# Patient Record
Sex: FEMALE | Race: WHITE | Employment: OTHER | URBAN - METROPOLITAN AREA
[De-identification: names, ages, dates, MRNs, and addresses within clinical notes are randomized per-mention and may not be internally consistent; named-entity substitution may affect disease eponyms.]

---

## 2017-07-31 ENCOUNTER — OFFICE VISIT (OUTPATIENT)
Dept: DERMATOLOGY CLINIC | Facility: CLINIC | Age: 82
End: 2017-07-31

## 2017-07-31 DIAGNOSIS — L72.0 MILIA: ICD-10-CM

## 2017-07-31 DIAGNOSIS — D23.60 BENIGN NEOPLASM OF SKIN OF UPPER LIMB, INCLUDING SHOULDER, UNSPECIFIED LATERALITY: ICD-10-CM

## 2017-07-31 DIAGNOSIS — D23.4 BENIGN NEOPLASM OF SCALP AND SKIN OF NECK: ICD-10-CM

## 2017-07-31 DIAGNOSIS — L57.0 LICHENOID ACTINIC KERATOSIS: Primary | ICD-10-CM

## 2017-07-31 DIAGNOSIS — D23.5 BENIGN NEOPLASM OF SKIN OF TRUNK, EXCEPT SCROTUM: ICD-10-CM

## 2017-07-31 DIAGNOSIS — D23.30 BENIGN NEOPLASM OF SKIN OF FACE: ICD-10-CM

## 2017-07-31 DIAGNOSIS — L81.4 LENTIGO: ICD-10-CM

## 2017-07-31 DIAGNOSIS — L30.9 DERMATITIS: ICD-10-CM

## 2017-07-31 PROCEDURE — G0463 HOSPITAL OUTPT CLINIC VISIT: HCPCS | Performed by: DERMATOLOGY

## 2017-07-31 PROCEDURE — 99203 OFFICE O/P NEW LOW 30 MIN: CPT | Performed by: DERMATOLOGY

## 2017-07-31 RX ORDER — TRIAMCINOLONE ACETONIDE 5 MG/G
CREAM TOPICAL
Qty: 15 G | Refills: 1 | Status: SHIPPED | OUTPATIENT
Start: 2017-07-31 | End: 2019-03-18

## 2017-08-13 NOTE — PROGRESS NOTES
Alfredo Gill is a 80year old female. HPI:     CC:  Patient presents with:  Full Skin Exam: new pt. presents for full body exam, no hx of skin CA, presents with multiple itchy spots to arms and abdomen.          Allergies:  Review of patient's allergies sarita Social History Main Topics   Smoking status: Former Smoker     Smokeless tobacco: Never Used    Alcohol use Yes    Drug use: No    Sexual activity: Not on file     Other Topics Concern    Pt has a pacemaker No    Pt has a defibrillator No    Reaction t details of examination  See Assessment /Plan for additional history and physical exam also:    Assessment / plan:    No orders of the defined types were placed in this encounter.       Meds & Refills for this Visit:  Signed Prescriptions Disp Refills    tri checkup 6 mos - one year or p.r.n.

## 2019-03-18 ENCOUNTER — OFFICE VISIT (OUTPATIENT)
Dept: FAMILY MEDICINE CLINIC | Facility: CLINIC | Age: 84
End: 2019-03-18
Payer: MEDICARE

## 2019-03-18 VITALS
SYSTOLIC BLOOD PRESSURE: 130 MMHG | DIASTOLIC BLOOD PRESSURE: 80 MMHG | HEIGHT: 60 IN | WEIGHT: 104 LBS | RESPIRATION RATE: 18 BRPM | HEART RATE: 79 BPM | BODY MASS INDEX: 20.42 KG/M2 | OXYGEN SATURATION: 94 %

## 2019-03-18 DIAGNOSIS — R13.10 DYSPHAGIA, UNSPECIFIED TYPE: ICD-10-CM

## 2019-03-18 DIAGNOSIS — R53.81 PHYSICAL DECONDITIONING: ICD-10-CM

## 2019-03-18 DIAGNOSIS — K43.2 INCISIONAL HERNIA, WITHOUT OBSTRUCTION OR GANGRENE: ICD-10-CM

## 2019-03-18 DIAGNOSIS — R21 RASH: ICD-10-CM

## 2019-03-18 DIAGNOSIS — R26.89 POOR BALANCE: ICD-10-CM

## 2019-03-18 DIAGNOSIS — Z00.00 ROUTINE MEDICAL EXAM: Primary | ICD-10-CM

## 2019-03-18 PROCEDURE — G0439 PPPS, SUBSEQ VISIT: HCPCS | Performed by: FAMILY MEDICINE

## 2019-03-18 NOTE — PROGRESS NOTES
HPI:   Guy Tian is a 80year old female who presents for a Medicare Subsequent Annual Wellness visit (Pt already had Initial Annual Wellness). Has a h/o hernia repair. Will have a hernia that pops out when bending over. Notices in her upper abdomen. screening of functional status. She has difficulties Taking Meds as Rx'd based on screening of functional status. She has difficulties Affording Meds based on screening of functional status.        She has Hearing problems based on screening of labs)   No results found for: WBC, HGB, PLT     ALLERGIES:   She has No Known Allergies.     CURRENT MEDICATIONS:     Outpatient Medications Marked as Taking for the 3/18/19 encounter (Office Visit) with Grupo Brower DO:  prednisoLONE acetate (PRED FORTE) exam    Incisional hernia, without obstruction or gangrene  -     US ABDOMEN COMPLETE (CPT=76700);  Future    Poor balance  -     PHYSICAL THERAPY EXTERNAL    Dysphagia, unspecified type  -     SPEECH THERAPY - INTERNAL    Rash    Physical deconditioning  - Pap: Every 3 yrs age 21-65 or Pap+HPV every 5 yrs age 33-67, age 72 and older at high risk There are no preventive care reminders to display for this patient.  Update Health Maintenance if applicable    Chlamydia  Annually if high risk No results found for:

## 2019-03-31 ENCOUNTER — DIAGNOSTIC TRANS (OUTPATIENT)
Dept: OTHER | Age: 84
End: 2019-03-31

## 2019-04-01 ENCOUNTER — HOSPITAL (OUTPATIENT)
Dept: OTHER | Age: 84
End: 2019-04-01

## 2019-04-01 ENCOUNTER — HOSPITAL (OUTPATIENT)
Dept: OTHER | Age: 84
End: 2019-04-01
Attending: INTERNAL MEDICINE

## 2019-04-01 LAB
ANALYZER ANC (IANC): NORMAL
ANION GAP SERPL CALC-SCNC: 14 MMOL/L (ref 10–20)
APTT PPP: 25 SECONDS (ref 22–32)
APTT PPP: NORMAL S
BASOPHILS # BLD: 0 THOUSAND/MCL (ref 0–0.3)
BASOPHILS NFR BLD: 0 %
BUN SERPL-MCNC: 20 MG/DL (ref 6–20)
BUN/CREAT SERPL: 28 (ref 7–25)
CALCIUM SERPL-MCNC: 8.8 MG/DL (ref 8.4–10.2)
CHLORIDE: 101 MMOL/L (ref 98–107)
CHOLEST SERPL-MCNC: 152 MG/DL
CHOLEST/HDLC SERPL: 2 {RATIO}
CO2 SERPL-SCNC: 26 MMOL/L (ref 21–32)
CREAT SERPL-MCNC: 0.72 MG/DL (ref 0.51–0.95)
DIFFERENTIAL METHOD BLD: NORMAL
EOSINOPHIL # BLD: 0.1 THOUSAND/MCL (ref 0.1–0.5)
EOSINOPHIL NFR BLD: 3 %
ERYTHROCYTE [DISTWIDTH] IN BLOOD: 12.9 % (ref 11–15)
GLUCOSE BLDC GLUCOMTR-MCNC: 95 MG/DL (ref 65–99)
GLUCOSE SERPL-MCNC: 110 MG/DL (ref 65–99)
HDLC SERPL-MCNC: 75 MG/DL
HEMATOCRIT: 36.8 % (ref 36–46.5)
HGB BLD-MCNC: 12.3 GM/DL (ref 12–15.5)
IMM GRANULOCYTES # BLD AUTO: 0 THOUSAND/MCL (ref 0–0.2)
IMM GRANULOCYTES NFR BLD: 0 %
INR PPP: 1.1
LDLC SERPL CALC-MCNC: 69 MG/DL
LYMPHOCYTES # BLD: 1.4 THOUSAND/MCL (ref 1–4)
LYMPHOCYTES NFR BLD: 29 %
MAGNESIUM SERPL-MCNC: 1.9 MG/DL (ref 1.7–2.4)
MCH RBC QN AUTO: 29.2 PG (ref 26–34)
MCHC RBC AUTO-ENTMCNC: 33.4 GM/DL (ref 32–36.5)
MCV RBC AUTO: 87.4 FL (ref 78–100)
MONOCYTES # BLD: 0.5 THOUSAND/MCL (ref 0.3–0.9)
MONOCYTES NFR BLD: 10 %
NEUTROPHILS # BLD: 2.9 THOUSAND/MCL (ref 1.8–7.7)
NEUTROPHILS NFR BLD: 58 %
NEUTS SEG NFR BLD: NORMAL %
NONHDLC SERPL-MCNC: 77 MG/DL
NRBC (NRBCRE): 0 /100 WBC
PLATELET # BLD: 189 THOUSAND/MCL (ref 140–450)
POTASSIUM SERPL-SCNC: 4.3 MMOL/L (ref 3.4–5.1)
PROTHROMBIN TIME: 11.4 SECONDS (ref 9.7–11.8)
PROTHROMBIN TIME: NORMAL
RBC # BLD: 4.21 MILLION/MCL (ref 4–5.2)
SODIUM SERPL-SCNC: 137 MMOL/L (ref 135–145)
TRIGLYCERIDE (TRIGP): 38 MG/DL
TROPONIN I SERPL HS-MCNC: <0.02 NG/ML
WBC # BLD: 5 THOUSAND/MCL (ref 4.2–11)

## 2019-04-01 PROCEDURE — 93306 TTE W/DOPPLER COMPLETE: CPT | Performed by: INTERNAL MEDICINE

## 2019-04-12 ENCOUNTER — OFFICE VISIT (OUTPATIENT)
Dept: DERMATOLOGY CLINIC | Facility: CLINIC | Age: 84
End: 2019-04-12
Payer: MEDICARE

## 2019-04-12 DIAGNOSIS — L30.9 DERMATITIS: ICD-10-CM

## 2019-04-12 DIAGNOSIS — D23.60 BENIGN NEOPLASM OF SKIN OF UPPER LIMB, INCLUDING SHOULDER, UNSPECIFIED LATERALITY: ICD-10-CM

## 2019-04-12 DIAGNOSIS — D23.5 BENIGN NEOPLASM OF SKIN OF TRUNK, EXCEPT SCROTUM: ICD-10-CM

## 2019-04-12 DIAGNOSIS — D23.4 BENIGN NEOPLASM OF SCALP AND SKIN OF NECK: ICD-10-CM

## 2019-04-12 DIAGNOSIS — D23.30 BENIGN NEOPLASM OF SKIN OF FACE: ICD-10-CM

## 2019-04-12 DIAGNOSIS — L72.0 MILIA: Primary | ICD-10-CM

## 2019-04-12 DIAGNOSIS — L81.4 LENTIGO: ICD-10-CM

## 2019-04-12 PROCEDURE — 99213 OFFICE O/P EST LOW 20 MIN: CPT | Performed by: DERMATOLOGY

## 2019-04-12 PROCEDURE — G0463 HOSPITAL OUTPT CLINIC VISIT: HCPCS | Performed by: DERMATOLOGY

## 2019-04-12 RX ORDER — ATORVASTATIN CALCIUM 10 MG/1
10 TABLET, FILM COATED ORAL
COMMUNITY
Start: 2019-04-01 | End: 2021-05-19

## 2019-04-12 RX ORDER — MULTIVIT-MIN/IRON/FOLIC ACID/K 18-600-40
CAPSULE ORAL
COMMUNITY
Start: 2019-04-01 | End: 2021-09-10

## 2019-04-22 NOTE — PROGRESS NOTES
Alfredo Gill is a 80year old female. HPI:     CC:  Patient presents with:  Full Skin Exam: LOV 7/31/2017. Pt presenting for full body skin exam. Pt denies personal and family hx of skin cancer.          Allergies:  Ciprofloxacin; Penicillins; Sulfamethoxazo Worry: Not on file        Inability: Not on file      Transportation needs:        Medical: Not on file        Non-medical: Not on file    Tobacco Use      Smoking status: Former Smoker      Smokeless tobacco: Never Used    Substance and Sexual Activit noted.       Physical Examination:     Well-developed well-nourished patient alert oriented in no acute distress.   Exam total-body performed, including scalp, head, neck, face,nails, hair, external eyes, including conjunctival mucosa, eyelids, lips externa needed. Lichenoid keratosis right forearm. Resolved presently     cyst anterior hairline recommend observation. Unchanging    Multiple lentigines no other suspicious lesions    On examination multiple whitish dome-shaped smooth papules are noted.   Mil

## 2019-04-24 ENCOUNTER — HOSPITAL ENCOUNTER (OUTPATIENT)
Dept: ULTRASOUND IMAGING | Age: 84
Discharge: HOME | End: 2019-04-24
Attending: FAMILY MEDICINE
Payer: MEDICARE

## 2019-04-24 DIAGNOSIS — K43.2 INCISIONAL HERNIA, WITHOUT OBSTRUCTION OR GANGRENE: ICD-10-CM

## 2019-04-24 PROCEDURE — 76700 US EXAM ABDOM COMPLETE: CPT | Performed by: FAMILY MEDICINE

## 2019-04-30 ENCOUNTER — TELEPHONE (OUTPATIENT)
Dept: FAMILY MEDICINE CLINIC | Facility: CLINIC | Age: 84
End: 2019-04-30

## 2019-05-01 NOTE — TELEPHONE ENCOUNTER
Pt notified of Kris Perry findings of her US:    Notes recorded by Foy Jeans, DO on 4/24/2019 at 12:25 PM CDT  Please call patient with results. The ultrasound of the abdomen is normal, there was nothing else of concern seen on the ultrasound.     Pt verbaliz

## 2019-05-15 ENCOUNTER — OFFICE VISIT (OUTPATIENT)
Dept: FAMILY MEDICINE CLINIC | Facility: CLINIC | Age: 84
End: 2019-05-15
Payer: MEDICARE

## 2019-05-15 VITALS
DIASTOLIC BLOOD PRESSURE: 60 MMHG | SYSTOLIC BLOOD PRESSURE: 120 MMHG | WEIGHT: 111 LBS | HEIGHT: 60 IN | HEART RATE: 83 BPM | BODY MASS INDEX: 21.79 KG/M2 | OXYGEN SATURATION: 90 %

## 2019-05-15 DIAGNOSIS — S80.811A ABRASION OF ANTERIOR RIGHT LOWER LEG, INITIAL ENCOUNTER: Primary | ICD-10-CM

## 2019-05-15 PROCEDURE — 99213 OFFICE O/P EST LOW 20 MIN: CPT | Performed by: FAMILY MEDICINE

## 2019-05-15 NOTE — PROGRESS NOTES
Delmi Higgins is a 80year old female. Patient presents with:  Wound Recheck: c/o cut on left leg. HPI:   4 days ago scraped her shin on the car door. Recently had a TIA then started taking a baby ASA.    When she scraped her leg there was a lot of ble Substance and Sexual Activity      Alcohol use: Yes      Drug use: No      Sexual activity: Not on file    Lifestyle      Physical activity:        Days per week: Not on file        Minutes per session: Not on file      Stress: Not on file    Relationships Abrasion of anterior right lower leg, initial encounter  - [facility] WOUND EVAL    Continue to keep wound clean and dry and covered. Referred to wound care for further management.      Given further recommendations as below    Orders Placed This Visit:  No orde

## 2019-05-17 ENCOUNTER — TELEPHONE (OUTPATIENT)
Dept: INTEGRATIVE MEDICINE | Facility: CLINIC | Age: 84
End: 2019-05-17

## 2019-05-17 DIAGNOSIS — S80.812A ABRASION OF ANTERIOR LEFT LOWER LEG, INITIAL ENCOUNTER: Primary | ICD-10-CM

## 2019-05-17 NOTE — TELEPHONE ENCOUNTER
RE:  Dx: Abrasion of anterior right lower leg, initial encounter (X53.707O)    PT needs orders changed to correct LEFT leg    Please call PT when this is corrected so she can schedule with wound clinic

## 2019-05-19 NOTE — TELEPHONE ENCOUNTER
----- Message from Sommer Webber sent at 5/17/2019 10:23 AM CDT -----  Regarding: Order Correction  Good morning,  An order was placed for the right leg, the patient is stating its her left leg. Can you please correct the order.     Thank you  Milad Fuentes

## 2019-05-23 ENCOUNTER — OFFICE VISIT (OUTPATIENT)
Dept: FAMILY MEDICINE CLINIC | Facility: CLINIC | Age: 84
End: 2019-05-23
Payer: MEDICARE

## 2019-05-23 ENCOUNTER — TELEPHONE (OUTPATIENT)
Dept: FAMILY MEDICINE CLINIC | Facility: CLINIC | Age: 84
End: 2019-05-23

## 2019-05-23 VITALS
DIASTOLIC BLOOD PRESSURE: 82 MMHG | BODY MASS INDEX: 21.67 KG/M2 | OXYGEN SATURATION: 96 % | TEMPERATURE: 98 F | HEIGHT: 60 IN | WEIGHT: 110.38 LBS | SYSTOLIC BLOOD PRESSURE: 130 MMHG | HEART RATE: 88 BPM

## 2019-05-23 DIAGNOSIS — S80.812D ABRASION OF ANTERIOR LEFT LOWER LEG, SUBSEQUENT ENCOUNTER: Primary | ICD-10-CM

## 2019-05-23 PROCEDURE — 99213 OFFICE O/P EST LOW 20 MIN: CPT | Performed by: PHYSICIAN ASSISTANT

## 2019-05-23 NOTE — PROGRESS NOTES
Mallory Burnett is a 80year old female. Patient presents with: Follow - Up      HPI:   Patient presents to follow up on anterior leg wound. It has been 3 weeks and it is not looking any better. Patient denies fever or pain. No pus.  She is changing bandages (VITAMIN D OR) Take  by mouth. Disp:  Rfl:    Cranberry (CRAN-MAX OR) Take  by mouth. Disp:  Rfl:        SOCIAL HISTORY:   Social History    Socioeconomic History      Marital status:        Spouse name: Not on file      Number of children: Not on fi BP: 130/82   Pulse: 88   Temp: 98.2 °F (36.8 °C)   SpO2: 96%   Weight: 110 lb 6.4 oz   Height: 60\"       Physical Exam   Constitutional: She is oriented to person, place, and time and well-developed, well-nourished, and in no distress.    Neck: Normal ra Reece Lombardo, PA

## 2019-05-23 NOTE — TELEPHONE ENCOUNTER
RN spoke with patients daughter after consulting with Baljit Bethea, and the patient should keep the appt for next week at clinic, if there any issue of grave concern, patient can be seen in the ER

## 2019-05-23 NOTE — TELEPHONE ENCOUNTER
Josie Gomes (daughter) Cell [phone] calling regarding wound care clinic appointment, daughter states first available 05/31 and wants to know if we can get her in sooner.

## 2019-05-30 ENCOUNTER — OFFICE VISIT (OUTPATIENT)
Dept: WOUND CARE | Facility: HOSPITAL | Age: 84
End: 2019-05-30
Attending: CLINICAL NURSE SPECIALIST
Payer: MEDICARE

## 2019-05-30 DIAGNOSIS — S80.812A ABRASION OF LEFT LEG: Primary | ICD-10-CM

## 2019-05-30 DIAGNOSIS — I87.2 VENOUS INSUFFICIENCY: ICD-10-CM

## 2019-05-30 PROCEDURE — 29581 APPL MULTLAYER CMPRN SYS LEG: CPT

## 2019-05-30 PROCEDURE — 99214 OFFICE O/P EST MOD 30 MIN: CPT

## 2019-05-30 NOTE — PROGRESS NOTES
Subjective    Chief Complaint  This information was obtained from the patient  The patient is new to the [address] [facility],[address] here for an initial visit for the evaluation and management of non-healing wound(s).     Allergies  ciprofloxacin, Penicillins, sulfamethox Genitourinary (): Urinary Incontinence (wears pads)  Musculoskeletal: Other (leg cramps)  Integumentary (Hair/Skin/Nails): Calluses/Corns (Right plantar-recommended to follow up with a Podiatrist for trimming of toenails and callus paring. ), Ulcers (LLE Depression Severity: 0-4: none, 5-9 mild, 10-14 moderate, 15-19 moderately severe, 20-27 severe: 0    Medications  atorvastatin - oral 10 mg tablet once daily  prednisolone acetate - ophthalmic (eye) 1 % drops,suspension once daily  aspirin - oral 81 mg ta Wound #2 Left, Distal, Anterior Lower Leg is an Eschar covered Trauma Wound and has received a status of Not Healed. Initial wound encounter measurements are 2.5cm length x 2.7cm width, with an area of 6.75 sq cm . No tunneling has been noted.  No sinus tra Extremity Color: WNL  Hair Growth on Extremity: No  Temperature of Extremity: Warm  Capillary Refill: < 3 seconds          Assessment    Active Problems    ICD-10  (Encounter Diagnosis) I87.2 - Venous insufficiency (chronic) (peripheral)  (Encounter Diagno Wound #1 (Trauma Wound) is located on the left, proximal, anterior lower leg. A Multi Layer Compression Wrap procedure was performed by Susanne Ontiveros RN. Kyrie Woodard was applied with . The procedure was tolerated well.         Plan    Wound O Multi Layer Compression Wrap Details  Patient Name: OBINNA TRAN   Patient Number: [id number]  Patient YOB: 1931  Date: 5/30/2019  RN: Susanne Ontiveros CNA/ZACHARY/MEHDI: Katt Omalley  Physician / Rashmi Parra: Sonny Prince  Procedure Performed for: Jeffery Louis

## 2019-06-04 ENCOUNTER — OFFICE VISIT (OUTPATIENT)
Dept: FAMILY MEDICINE CLINIC | Facility: CLINIC | Age: 84
End: 2019-06-04
Payer: MEDICARE

## 2019-06-04 VITALS
RESPIRATION RATE: 16 BRPM | HEIGHT: 60 IN | OXYGEN SATURATION: 93 % | DIASTOLIC BLOOD PRESSURE: 60 MMHG | HEART RATE: 85 BPM | SYSTOLIC BLOOD PRESSURE: 110 MMHG | BODY MASS INDEX: 21.4 KG/M2 | WEIGHT: 109 LBS

## 2019-06-04 DIAGNOSIS — R13.10 DYSPHAGIA, UNSPECIFIED TYPE: ICD-10-CM

## 2019-06-04 DIAGNOSIS — R26.89 POOR BALANCE: ICD-10-CM

## 2019-06-04 DIAGNOSIS — S80.812D ABRASION OF LEFT LOWER EXTREMITY, SUBSEQUENT ENCOUNTER: Primary | ICD-10-CM

## 2019-06-04 PROCEDURE — 99214 OFFICE O/P EST MOD 30 MIN: CPT | Performed by: FAMILY MEDICINE

## 2019-06-04 NOTE — PROGRESS NOTES
Brien Santiago is a 80year old female. Patient presents with: Follow - Up: 2 months      HPI:     Going for PT which is helping her neck and her posture, also improving her pain and her ROM. Going to [facility] in [place]. Seeing wound care for LLE wound.    Wound i Occupational History      Not on file    Social Needs      Financial resource strain: Not on file      Food insecurity:        Worry: Not on file        Inability: Not on file      Transportation needs:        Medical: Not on file        Non-medical: Not o normal.   Neck: Neck supple. Cardiovascular: Normal rate. Pulmonary/Chest: Effort normal.   Musculoskeletal: She exhibits no edema. Neurological: She is alert and oriented to person, place, and time. No cranial nerve deficit.  Gait normal.   Skin: Ski

## 2019-06-05 ENCOUNTER — OFFICE VISIT (OUTPATIENT)
Dept: WOUND CARE | Facility: HOSPITAL | Age: 84
End: 2019-06-05
Attending: CLINICAL NURSE SPECIALIST
Payer: MEDICARE

## 2019-06-05 DIAGNOSIS — I87.2 VENOUS INSUFFICIENCY: ICD-10-CM

## 2019-06-05 DIAGNOSIS — S81.802D LEG WOUND, LEFT, SUBSEQUENT ENCOUNTER: Primary | ICD-10-CM

## 2019-06-05 DIAGNOSIS — S80.812D ABRASION OF LEFT LOWER EXTREMITY, SUBSEQUENT ENCOUNTER: ICD-10-CM

## 2019-06-05 PROCEDURE — 29581 APPL MULTLAYER CMPRN SYS LEG: CPT

## 2019-06-05 NOTE — PROGRESS NOTES
Subjective    Chief Complaint  This information was obtained from the patient  The patient is new to the [address] [facility],[address] here for an initial visit for the evaluation and management of non-healing wound(s).  6/5/19 no additional concerns    Allergies  ciproflox Musculoskeletal: Assistive Devices  Neurological: Memory Loss (patient denies), Dizziness, Headaches  Psychiatric: Anxiety, Depression        Objective    Constitutional  Height/Length: 64 in (162.56 cm), Weight: 105 lbs (47.73 kgs), BMI: 18, Temperature: The periwound skin exhibited: Edema, Dry/Scaly, Erythema, Hemosiderosis.  The periwound skin did not exhibit: Brawny Induration, Excoriation, Induration, Callus, Crepitus, Fluctuance, Friable, Rash, Moist, Maceration, Atrophie Tammy, Cyanosis, Ecchymosis, Wound #2 (Trauma Wound) is located on the left, distal, anterior lower leg. A Multi Layer Compression Wrap procedure was performed for the lower left extremity by Sathya Saravia RN. [place] Feathers was applied with .  The procedure was tolerated w Plan: To schedule appointment with [facility] medical to discuss options of Velcro compression garments for long-term management of venous insufficiency and prevention of recurrence of venous stasis ulcers.       Entered By: Elis Ho on 06/05/2019 4:21:0

## 2019-06-07 ENCOUNTER — APPOINTMENT (OUTPATIENT)
Dept: WOUND CARE | Facility: HOSPITAL | Age: 84
End: 2019-06-07
Payer: MEDICARE

## 2019-06-12 ENCOUNTER — OFFICE VISIT (OUTPATIENT)
Dept: WOUND CARE | Facility: HOSPITAL | Age: 84
End: 2019-06-12
Attending: CLINICAL NURSE SPECIALIST
Payer: MEDICARE

## 2019-06-12 DIAGNOSIS — S80.812D ABRASION OF LEFT LOWER EXTREMITY, SUBSEQUENT ENCOUNTER: ICD-10-CM

## 2019-06-12 PROCEDURE — 99213 OFFICE O/P EST LOW 20 MIN: CPT

## 2019-06-12 NOTE — PROGRESS NOTES
Subjective    Chief Complaint  This information was obtained from the patient  The patient is new to the [address] [facility],[address] here for an initial visit for the evaluation and management of non-healing wound(s).  6/12/19 no additional concerns    Allergies  ciproflo Gastrointestinal (GI): Change in Bowel Habits  Musculoskeletal: Assistive Devices  Neurological: Memory Loss (patient denies), Dizziness, Headaches  Psychiatric: Anxiety, Depression        Objective    Constitutional  Height/Length: 64 in (162.56 cm), Frances Mccarty Patient 's LLE wound healed with epithelialization noted. Vaseline to moisturize dry skin to LLE . Applied tubigrip stocking to LLE. Instructed to apply tubigrip on in morning and off at bedtime. To apply lotion BLE at bedtime.  Tubigrip stocking last 1 mon

## 2019-06-19 ENCOUNTER — APPOINTMENT (OUTPATIENT)
Dept: WOUND CARE | Facility: HOSPITAL | Age: 84
End: 2019-06-19
Attending: CLINICAL NURSE SPECIALIST
Payer: MEDICARE

## 2019-06-26 ENCOUNTER — APPOINTMENT (OUTPATIENT)
Dept: WOUND CARE | Facility: HOSPITAL | Age: 84
End: 2019-06-26
Attending: CLINICAL NURSE SPECIALIST
Payer: MEDICARE

## 2019-09-11 ENCOUNTER — TELEPHONE (OUTPATIENT)
Dept: PHYSICAL THERAPY | Facility: HOSPITAL | Age: 84
End: 2019-09-11

## 2019-09-12 ENCOUNTER — OFFICE VISIT (OUTPATIENT)
Dept: SPEECH THERAPY | Facility: HOSPITAL | Age: 84
End: 2019-09-12
Attending: FAMILY MEDICINE
Payer: MEDICARE

## 2019-09-12 PROCEDURE — 92610 EVALUATE SWALLOWING FUNCTION: CPT

## 2019-09-12 NOTE — PATIENT INSTRUCTIONS
SWALLOWING INSTRUCTIONS    DIET: Soft/Ground Solids with extra sauces/gravies and thin liquids     ____ SIT UPRIGHT    ____ SMALL BITES AND SIPS    ____ EAT SLOWLY    ____ ALTERNATED LIQUIDS AND SOLIDS    ____ [place] [employer] TWICE WITH EACH BITE    ____ NO STRA sounds:           Jolie Burns 171         Count        Goat Gold      Garden    Garlic      Correct    Caution      Cage                  Guilt   Gas       Gather     Karen Gatica

## 2019-09-12 NOTE — PROGRESS NOTES
ADULT SWALLOWING EVALUATION:   Referring Physician: Dr. Gordo Cornell  Oropharyngeal Dysphagia  Date of Service: 9/12/2019     PATIENT SUMMARY:   Britney Paredes is a 80year old y/o female who presents to therapy today with complaints of difficulty swallowing that s solids. Hard solids d/c d/t safety concerns.   No overt clinical signs of aspiration (e.g., immediate/delayed throat clear, immediate/delayed cough, wet vocal quality, increased O2 effort) observed across pureed solids, soft solids, nectar thickened liquids Laryngeal elevation and excursion reduced  and Signs/symptoms of aspiration none   (Please note: Silent aspiration cannot be evaluated clinically.  Videofluoroscopic Swallow Study is required to rule-out silent aspiration.)    Compensatory Swallow Strategie [phone]    Sincerely,  Electronically signed by therapist: MAAME Mayers    [de-identified] certification required: Yes  I certify the need for these services furnished under this plan of treatment and while under my care.         X_________________

## 2019-09-13 ENCOUNTER — TELEPHONE (OUTPATIENT)
Dept: INTEGRATIVE MEDICINE | Facility: CLINIC | Age: 84
End: 2019-09-13

## 2019-09-13 DIAGNOSIS — R13.10 DYSPHAGIA, UNSPECIFIED TYPE: Primary | ICD-10-CM

## 2019-09-13 DIAGNOSIS — R47.1 DYSARTHRIA: ICD-10-CM

## 2019-09-13 NOTE — TELEPHONE ENCOUNTER
----- Message from MAAME Hoffman sent at 9/12/2019  5:44 PM CDT -----  Regarding: Orders   Good afternoon,  Thank you for the referral for speech therapy. Could we have orders for   1. Video Fluoroscopic Swallow Study   2.  Speech-Therapy for dysar

## 2019-09-16 ENCOUNTER — APPOINTMENT (OUTPATIENT)
Dept: SPEECH THERAPY | Facility: HOSPITAL | Age: 84
End: 2019-09-16
Attending: FAMILY MEDICINE
Payer: MEDICARE

## 2019-09-18 ENCOUNTER — HOSPITAL ENCOUNTER (OUTPATIENT)
Dept: GENERAL RADIOLOGY | Facility: HOSPITAL | Age: 84
Discharge: HOME | End: 2019-09-18
Attending: FAMILY MEDICINE
Payer: MEDICARE

## 2019-09-18 DIAGNOSIS — R13.10 DYSPHAGIA, UNSPECIFIED TYPE: ICD-10-CM

## 2019-09-18 PROCEDURE — 74246 X-RAY XM UPR GI TRC 2CNTRST: CPT | Performed by: FAMILY MEDICINE

## 2019-09-26 ENCOUNTER — OFFICE VISIT (OUTPATIENT)
Dept: SPEECH THERAPY | Facility: HOSPITAL | Age: 84
End: 2019-09-26
Attending: FAMILY MEDICINE
Payer: MEDICARE

## 2019-09-26 PROCEDURE — 92526 ORAL FUNCTION THERAPY: CPT

## 2019-09-26 NOTE — PATIENT INSTRUCTIONS
1. CONTINUE EATING SOFT FOODS AND THIN LIQUIDS VIA CUP SIPS   2. Continue your swallowing exercises   3.  Please schedule your video swallow study (the x-ray of your swallow)           HARD/ EFFORTFUL SWALLOW      PURPOSE  To increase tongue base retraction

## 2019-10-03 ENCOUNTER — APPOINTMENT (OUTPATIENT)
Dept: SPEECH THERAPY | Facility: HOSPITAL | Age: 84
End: 2019-10-03
Attending: FAMILY MEDICINE
Payer: MEDICARE

## 2019-10-07 ENCOUNTER — APPOINTMENT (OUTPATIENT)
Dept: SPEECH THERAPY | Facility: HOSPITAL | Age: 84
End: 2019-10-07
Attending: FAMILY MEDICINE
Payer: MEDICARE

## 2019-10-09 ENCOUNTER — TELEPHONE (OUTPATIENT)
Dept: PHYSICAL THERAPY | Age: 84
End: 2019-10-09

## 2019-10-10 ENCOUNTER — APPOINTMENT (OUTPATIENT)
Dept: SPEECH THERAPY | Facility: HOSPITAL | Age: 84
End: 2019-10-10
Attending: FAMILY MEDICINE
Payer: MEDICARE

## 2019-10-14 ENCOUNTER — TELEPHONE (OUTPATIENT)
Dept: SPEECH THERAPY | Facility: HOSPITAL | Age: 84
End: 2019-10-14

## 2019-10-14 ENCOUNTER — OFFICE VISIT (OUTPATIENT)
Dept: SPEECH THERAPY | Facility: HOSPITAL | Age: 84
End: 2019-10-14
Attending: FAMILY MEDICINE
Payer: MEDICARE

## 2019-10-14 PROCEDURE — 92523 SPEECH SOUND LANG COMPREHEN: CPT

## 2019-10-14 PROCEDURE — 92526 ORAL FUNCTION THERAPY: CPT

## 2019-10-14 NOTE — PROGRESS NOTES
ADULT SPEECH/LANGUAGE EVALUATION:   Referring Physician: Dr. Colonel Roy  Diagnosis: CVA    Date of Service: 10/14/2019     PATIENT SUMMARY   Ilene Grimes is a 80year old female who presents to therapy today with complaints of difficulty with speech production assessed    Memory:n/a to be further assessed    Executive Functions: n/a to be further assessed    Language: n/a to be further assessed    Visuospatial Skills: n/a to be further assessed      BOUNDARY [facility] Diagnostic Aphasia Examination (AE)-Informal Speech As and Communication  Rehab Potential:good      Patient/Family/Caregiver was advised of these findings, precautions, and treatment options and has agreed to actively participate in planning and for this course of care.     Thank you for your referral. Please c

## 2019-10-14 NOTE — PROGRESS NOTES
Dx: Dysphagia         Authorized # of Visits:  4         Next MD visit: none scheduled  Fall Risk: standard         Precautions: n/a           Medication Changes since last visit?: No  Subjective:   Pt called and cancelled her VFSS this AM. SLP reviewed th compensatory strategies and aspiration precautions with the patient. Pt continues to report swallowing difficulties. VFSS procedure reviewed. Pt completes dysphagia exercises x10 each with mild-mod support.  Pt tolerates thin liquids via cup with no overt c

## 2019-10-16 ENCOUNTER — TELEPHONE (OUTPATIENT)
Dept: SPEECH THERAPY | Facility: HOSPITAL | Age: 84
End: 2019-10-16

## 2019-10-16 NOTE — PROGRESS NOTES
Pt called SLP to state, \"Tomorrow is a definite no. I do not want to take the swallow test.\" SLP to continue POC and provide education x1. Pt's daughter did not return SLP's phone call.        Melvin Guerrero M.S. [id number] [facility]  Speech Language Pathologist

## 2019-10-21 ENCOUNTER — APPOINTMENT (OUTPATIENT)
Dept: SPEECH THERAPY | Facility: HOSPITAL | Age: 84
End: 2019-10-21
Attending: FAMILY MEDICINE
Payer: MEDICARE

## 2019-10-24 ENCOUNTER — OFFICE VISIT (OUTPATIENT)
Dept: SPEECH THERAPY | Facility: HOSPITAL | Age: 84
End: 2019-10-24
Attending: FAMILY MEDICINE
Payer: MEDICARE

## 2019-10-24 PROCEDURE — 92526 ORAL FUNCTION THERAPY: CPT

## 2019-10-24 PROCEDURE — 92507 TX SP LANG VOICE COMM INDIV: CPT

## 2019-10-24 NOTE — PROGRESS NOTES
Dx: Dysphagia         Authorized # of Visits:  4         Next MD visit: none scheduled  Fall Risk: standard         Precautions: n/a           Medication Changes since last visit?: No  Subjective:   Pt did not comply with VFSS recommendations; however, day words     x10 BOT retraction        Labial exercises  x10 open/smile     x10 pucker/retract x10 smile + pucker     x10 open+close mouth  x10 pucker + simple     x10 open and close mouth        Lingual Exercises  x10 cheek to cheek     x10 corner to corner reinforcement of SLOB strategies and train oral/verbal agility tasks x1 session and d/c.  See data above         Goals:      Goal #1 The patient will tolerate ground solid consistency and thin liquids without overt signs or symptoms of aspiration with 100 %

## 2019-10-28 ENCOUNTER — OFFICE VISIT (OUTPATIENT)
Dept: SPEECH THERAPY | Facility: HOSPITAL | Age: 84
End: 2019-10-28
Attending: FAMILY MEDICINE
Payer: MEDICARE

## 2019-10-28 PROCEDURE — 92507 TX SP LANG VOICE COMM INDIV: CPT

## 2019-10-28 PROCEDURE — 92526 ORAL FUNCTION THERAPY: CPT

## 2019-10-28 NOTE — PROGRESS NOTES
SPEECH/SWALLOWING DAILY NOTE/DISCHARGE SUMMARY     Dx: Dysphagia         Authorized # of Visits:  4         Next MD visit: none scheduled  Fall Risk: standard         Precautions: n/a           Medication Changes since last visit?: No  Subjective:   Pt rep x20 AH-EE    x10 increasing pitch glides     x10 decreasing pitch glides     x10 falsetto /i/ 5 second holds     x10 effortful swallow    BOT  xBOT retraction x10     x10 G words     x3 Erlinda  x10 k words     x10 g words     x2 erlinda     x5 BOT retractio clinical signs of aspiration (e.g., immediate/delayed throat clear, immediate/delayed cough, wet vocal quality, increased O2 effort). Pt denies any globus sensation across all trials. D/C skilled 1:1 services d/t functional skills.  Pt did not participate i SLP f/u with her daughter. Pt's daughter did not return SLP's phone call. Safe swallow guideline, aspiration precautions, ground consistency, and GERD precautions provided. Pt v/u.     Goal #3 Patient will reduce risk of aspiration by completing the followi

## 2019-11-21 ENCOUNTER — TELEPHONE (OUTPATIENT)
Dept: INTEGRATIVE MEDICINE | Facility: CLINIC | Age: 84
End: 2019-11-21

## 2020-02-15 NOTE — PATIENT INSTRUCTIONS
Continue changing once a day. If fever or pus coming out of the wound go to the ER. Put in referral for the wound clinic in [place] to see if she can get in faster.  Otherwise keep appointment in [facility].
Yes...

## 2020-03-06 ENCOUNTER — OFFICE VISIT (OUTPATIENT)
Dept: DERMATOLOGY CLINIC | Facility: CLINIC | Age: 85
End: 2020-03-06
Payer: MEDICARE

## 2020-03-06 DIAGNOSIS — D23.5 BENIGN NEOPLASM OF SKIN OF TRUNK, EXCEPT SCROTUM: ICD-10-CM

## 2020-03-06 DIAGNOSIS — D23.70 BENIGN NEOPLASM OF SKIN OF LOWER LIMB, INCLUDING HIP, UNSPECIFIED LATERALITY: ICD-10-CM

## 2020-03-06 DIAGNOSIS — D23.60 BENIGN NEOPLASM OF SKIN OF UPPER LIMB, INCLUDING SHOULDER, UNSPECIFIED LATERALITY: ICD-10-CM

## 2020-03-06 DIAGNOSIS — L82.1 SEBORRHEIC KERATOSES: ICD-10-CM

## 2020-03-06 DIAGNOSIS — L81.4 LENTIGO: Primary | ICD-10-CM

## 2020-03-06 DIAGNOSIS — D23.4 BENIGN NEOPLASM OF SCALP AND SKIN OF NECK: ICD-10-CM

## 2020-03-06 DIAGNOSIS — D23.30 BENIGN NEOPLASM OF SKIN OF FACE: ICD-10-CM

## 2020-03-06 PROCEDURE — G0463 HOSPITAL OUTPT CLINIC VISIT: HCPCS | Performed by: DERMATOLOGY

## 2020-03-06 PROCEDURE — 99213 OFFICE O/P EST LOW 20 MIN: CPT | Performed by: DERMATOLOGY

## 2020-03-16 NOTE — PROGRESS NOTES
Mark Santoyo is a 80year old female. HPI:     CC:  Patient presents with: Follow - Up: per patient here for full body check.  patient states she has \"discoloration\"of arms, looks like bruising        Allergies:  Ciprofloxacin; Penicillins; Sulfamethoxazo Not on file      Transportation needs:        Medical: Not on file        Non-medical: Not on file    Tobacco Use      Smoking status: Former Smoker      Smokeless tobacco: Never Used    Substance and Sexual Activity      Alcohol use: Yes      Drug use: No or changeing lesions other than noted above. No fevers, chills, night sweats, unusual sun sensitivity. No other skin complaints. History, medications, allergies reviewed as noted.        Physical Examination:     Well-developed well-nourished patien emollients. Pathophysiology reviewed. Consider Contac allergy in differential.  Consider patch testing. Patient will let us know how they are doing over the next several weeks. Await clinical response to above therapies. Face abdomen.   Topical stero

## 2020-09-02 ENCOUNTER — TELEPHONE (OUTPATIENT)
Dept: INTEGRATIVE MEDICINE | Facility: CLINIC | Age: 85
End: 2020-09-02

## 2020-10-01 ENCOUNTER — TELEPHONE (OUTPATIENT)
Dept: INTEGRATIVE MEDICINE | Facility: CLINIC | Age: 85
End: 2020-10-01

## 2021-01-15 ENCOUNTER — TELEPHONE (OUTPATIENT)
Dept: INTEGRATIVE MEDICINE | Facility: CLINIC | Age: 86
End: 2021-01-15

## 2021-01-15 NOTE — TELEPHONE ENCOUNTER
Spoke to pt in attempts to schedule AWV. Pt does not drive. Her daughter is going to call office to set up a visit for her when she can bring her.

## 2021-05-19 ENCOUNTER — OFFICE VISIT (OUTPATIENT)
Dept: INTEGRATIVE MEDICINE | Facility: CLINIC | Age: 86
End: 2021-05-19
Payer: MEDICARE

## 2021-05-19 VITALS
OXYGEN SATURATION: 94 % | SYSTOLIC BLOOD PRESSURE: 134 MMHG | HEART RATE: 82 BPM | BODY MASS INDEX: 20.06 KG/M2 | HEIGHT: 60 IN | WEIGHT: 102.19 LBS | DIASTOLIC BLOOD PRESSURE: 76 MMHG

## 2021-05-19 DIAGNOSIS — H91.90 HEARING LOSS, UNSPECIFIED HEARING LOSS TYPE, UNSPECIFIED LATERALITY: ICD-10-CM

## 2021-05-19 DIAGNOSIS — R13.10 DYSPHAGIA, UNSPECIFIED TYPE: ICD-10-CM

## 2021-05-19 DIAGNOSIS — Z00.00 ROUTINE MEDICAL EXAM: Primary | ICD-10-CM

## 2021-05-19 PROBLEM — S80.812A ABRASION OF LEFT LEG: Status: RESOLVED | Noted: 2019-05-30 | Resolved: 2021-05-19

## 2021-05-19 PROCEDURE — G0439 PPPS, SUBSEQ VISIT: HCPCS | Performed by: FAMILY MEDICINE

## 2021-05-19 NOTE — PATIENT INSTRUCTIONS
I have complete dakota in the body's ability to heal and transform. The products and items listed below (the “Products”)  and their claims have not been evaluated by the Food and Drug Administration.  Dietary products are not intended to treat, prevent, m

## 2021-05-19 NOTE — PROGRESS NOTES
HPI:   Alfredo Gill is a 80year old female who presents for a Medicare Subsequent Annual Wellness visit (Pt already had Initial Annual Wellness). Lives alone in [facility].    Daughter takes her grocery shopping and does her laundry  Appetite - ok, doesn't alw patient in AVS     She does NOT have a Power of  for [employer] on file in [place].    Advance care planning including the explanation and discussion of advance directives standard forms performed Face to Face with patient and Family/surrogate (if pre Constitutional: Negative for activity change, appetite change, chills, fatigue, fever and unexpected weight change. HENT: Positive for hearing loss and trouble swallowing.  Negative for congestion, dental problem, ear pain, mouth sores, postnasal drip, strain to understand conversations: Sometimes   I have to worry about missing the telephone ring or doorbell: No I have trouble hearing conversations in a noisy background such as a crowded room or restaurant: Sometimes   I get confused about where sounds Musculoskeletal:         General: Normal range of motion. Cervical back: Normal range of motion and neck supple. Skin:     General: Skin is warm and dry. Findings: No rash.    Neurological:      Mental Status: She is alert and oriented to pers review of chart, separate sheet to patient  [address] Internal Lab or Procedure External Lab or Procedure   Diabetes Screening      HbgA1C   Annually No results found for: A1C No flowsheet data found.     Fasting Blood 65 No vaccine history found Please get once after your 65th birthday    Hepatitis B for Moderate/High Risk No vaccine history found Medium/high risk factors:   End-stage renal disease   Hemophiliacs who received Factor VIII or IX concentrates   Clients of

## 2021-09-10 ENCOUNTER — OFFICE VISIT (OUTPATIENT)
Dept: INTEGRATIVE MEDICINE | Facility: CLINIC | Age: 86
End: 2021-09-10
Payer: MEDICARE

## 2021-09-10 VITALS
HEIGHT: 60 IN | HEART RATE: 76 BPM | WEIGHT: 102.38 LBS | OXYGEN SATURATION: 96 % | DIASTOLIC BLOOD PRESSURE: 76 MMHG | BODY MASS INDEX: 20.1 KG/M2 | SYSTOLIC BLOOD PRESSURE: 154 MMHG

## 2021-09-10 DIAGNOSIS — H91.90 HEARING LOSS, UNSPECIFIED HEARING LOSS TYPE, UNSPECIFIED LATERALITY: ICD-10-CM

## 2021-09-10 DIAGNOSIS — T14.8XXA BLISTER: ICD-10-CM

## 2021-09-10 DIAGNOSIS — R60.0 LOWER EXTREMITY EDEMA: Primary | ICD-10-CM

## 2021-09-10 DIAGNOSIS — I83.813 VARICOSE VEINS OF BOTH LOWER EXTREMITIES WITH PAIN: ICD-10-CM

## 2021-09-10 PROCEDURE — 99214 OFFICE O/P EST MOD 30 MIN: CPT | Performed by: FAMILY MEDICINE

## 2021-09-10 NOTE — PROGRESS NOTES
Mark Santoyo is a 80year old female. Patient presents with:  Edema: R lower leg along with blistering      HPI:     Lives alone. Has chronic RLE. Started with blisters about 6 weeks ago. The blister size will get bigger. Some milkd pain.   Also swell Last Year:   Transportation Needs:       Lack of Transportation (Medical):       Lack of Transportation (Non-Medical):   Physical Activity:       Days of Exercise per Week:       Minutes of Exercise per Session:   Stress:       Feeling of Stress :   Social laterality    4. Varicose veins of both lower extremities with pain    Advised on strategies to reduce leg swelling. Monitor for infection and/or ulcer. Elevate legs when sitting. No signs of infection on exam today.    Varicose veins likely also contr sitting. Return if symptoms worsen or fail to improve. Patient affirmed understanding of plan and all questions were answered.      Hai Infante, DO

## 2021-11-13 ENCOUNTER — APPOINTMENT (OUTPATIENT)
Dept: GENERAL RADIOLOGY | Age: 86
End: 2021-11-13
Attending: EMERGENCY MEDICINE

## 2021-11-13 ENCOUNTER — HOSPITAL ENCOUNTER (EMERGENCY)
Age: 86
Discharge: HOME | End: 2021-11-13
Attending: EMERGENCY MEDICINE

## 2021-11-13 VITALS
BODY MASS INDEX: 21.82 KG/M2 | DIASTOLIC BLOOD PRESSURE: 75 MMHG | TEMPERATURE: 98.1 F | RESPIRATION RATE: 15 BRPM | SYSTOLIC BLOOD PRESSURE: 144 MMHG | HEART RATE: 79 BPM | WEIGHT: 108.25 LBS | HEIGHT: 59 IN | OXYGEN SATURATION: 97 %

## 2021-11-13 DIAGNOSIS — S42.292A OTHER CLOSED DISPLACED FRACTURE OF PROXIMAL END OF LEFT HUMERUS, INITIAL ENCOUNTER: ICD-10-CM
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
## 2021-11-13 PROCEDURE — 10004651 HB RX, NO CHARGE ITEM: Performed by: EMERGENCY MEDICINE

## 2021-11-13 PROCEDURE — 73060 X-RAY EXAM OF HUMERUS: CPT

## 2021-11-13 PROCEDURE — 99283 EMERGENCY DEPT VISIT LOW MDM: CPT

## 2021-11-13 RX ORDER — ACETAMINOPHEN 500 MG
500 TABLET ORAL ONCE
Status: COMPLETED | OUTPATIENT
Start: 2021-11-13 | End: 2021-11-13

## 2021-11-13 RX ORDER — TRAMADOL HYDROCHLORIDE 50 MG/1
50 TABLET ORAL ONCE
Status: DISCONTINUED | OUTPATIENT
Start: 2021-11-13 | End: 2021-11-13 | Stop reason: HOSPADM

## 2021-11-13 RX ADMIN — ACETAMINOPHEN 500 MG: 500 TABLET ORAL at 17:57

## 2021-11-13 ASSESSMENT — ENCOUNTER SYMPTOMS
BACK PAIN: 0
SHORTNESS OF BREATH: 0
CHEST TIGHTNESS: 0
FATIGUE: 0
COUGH: 0
NEUROLOGICAL NEGATIVE: 1
HEMATOLOGIC/LYMPHATIC NEGATIVE: 1
WOUND: 0
PSYCHIATRIC NEGATIVE: 1
FEVER: 0
APPETITE CHANGE: 0
ENDOCRINE NEGATIVE: 1
GASTROINTESTINAL NEGATIVE: 1
ALLERGIC/IMMUNOLOGIC NEGATIVE: 1

## 2021-11-13 ASSESSMENT — PAIN SCALES - GENERAL: PAINLEVEL_OUTOF10: 4

## 2021-11-15 ENCOUNTER — TELEPHONE (OUTPATIENT)
Dept: INTEGRATIVE MEDICINE | Facility: CLINIC | Age: 86
End: 2021-11-15

## 2021-11-15 NOTE — TELEPHONE ENCOUNTER
Patient scheduled 11/18 with Dr Jodi Richards for hospital follow-up. Please contact Albert Shepherd, patient's daughter she would like to know if her mother can take OTC Aleve for pain relief.  Thank you

## 2021-11-16 ENCOUNTER — OFFICE VISIT (OUTPATIENT)
Dept: SPORTS MEDICINE | Age: 86
End: 2021-11-16

## 2021-11-16 VITALS — BODY MASS INDEX: 21.77 KG/M2 | HEIGHT: 59 IN | WEIGHT: 108 LBS

## 2021-11-16 DIAGNOSIS — S42.252A CLOSED DISPLACED FRACTURE OF GREATER TUBEROSITY OF LEFT HUMERUS, INITIAL ENCOUNTER: Primary | ICD-10-CM

## 2021-11-16 DIAGNOSIS — M79.675 GREAT TOE PAIN, LEFT: ICD-10-CM

## 2021-11-16 DIAGNOSIS — M25.612 DECREASED RANGE OF MOTION OF LEFT SHOULDER: ICD-10-CM

## 2021-11-16 DIAGNOSIS — R07.81 RIB PAIN ON LEFT SIDE: ICD-10-CM

## 2021-11-16 DIAGNOSIS — M79.602 LEFT ARM PAIN: ICD-10-CM

## 2021-11-16 PROCEDURE — 99204 OFFICE O/P NEW MOD 45 MIN: CPT | Performed by: FAMILY MEDICINE

## 2021-11-16 ASSESSMENT — ENCOUNTER SYMPTOMS
SEIZURES: 0
FATIGUE: 0
FEVER: 0
HEADACHES: 0
DIZZINESS: 0
NAUSEA: 0
TROUBLE SWALLOWING: 0
CONSTIPATION: 0
NERVOUS/ANXIOUS: 0
DIARRHEA: 0
CHEST TIGHTNESS: 0
EYE REDNESS: 0
WHEEZING: 0
SLEEP DISTURBANCE: 1
ABDOMINAL PAIN: 0
SHORTNESS OF BREATH: 0
ACTIVITY CHANGE: 0
PHOTOPHOBIA: 0
SORE THROAT: 0
NUMBNESS: 0
BACK PAIN: 0

## 2021-11-18 ENCOUNTER — OFFICE VISIT (OUTPATIENT)
Dept: INTEGRATIVE MEDICINE | Facility: CLINIC | Age: 86
End: 2021-11-18
Payer: MEDICARE

## 2021-11-18 VITALS
HEART RATE: 86 BPM | DIASTOLIC BLOOD PRESSURE: 72 MMHG | HEIGHT: 60 IN | OXYGEN SATURATION: 98 % | SYSTOLIC BLOOD PRESSURE: 120 MMHG | WEIGHT: 108 LBS | BODY MASS INDEX: 21.2 KG/M2

## 2021-11-18 DIAGNOSIS — S42.202A CLOSED FRACTURE OF PROXIMAL END OF LEFT HUMERUS, UNSPECIFIED FRACTURE MORPHOLOGY, INITIAL ENCOUNTER: Primary | ICD-10-CM

## 2021-11-18 DIAGNOSIS — R19.7 DIARRHEA, UNSPECIFIED TYPE: ICD-10-CM
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
## 2021-11-18 PROCEDURE — 99214 OFFICE O/P EST MOD 30 MIN: CPT | Performed by: FAMILY MEDICINE

## 2021-11-18 PROCEDURE — 1111F DSCHRG MED/CURRENT MED MERGE: CPT | Performed by: FAMILY MEDICINE

## 2021-11-18 NOTE — PROGRESS NOTES
Adria Orr is a 80year old female. Patient presents with:  Hospital F/U: fell sat-broken left arm, with bruising and swelling-toes as well      HPI:   80 female follow up after ED visit. Reports falling well walking near her home.  Denies LOC or head trau history.     CURRENT MEDICATIONS:     Current Outpatient Medications   Medication Sig Dispense Refill   • CUSTOM MEDICATION  500 mcg, Take 1 capsule by mouth daily for 30 days 30 each 0       SOCIAL HISTORY:   Social History    Socioeconomic History moist.      Pharynx: Oropharynx is clear. Eyes:      Conjunctiva/sclera: Conjunctivae normal.      Pupils: Pupils are equal, round, and reactive to light. Cardiovascular:      Rate and Rhythm: Normal rate and regular rhythm. Pulses: Normal pulses. placed in this encounter.       Patient Instructions   Follow up with physical therapy as directed     Supplement/Nutrient Support:        Ortho Biotic by [employer]   Dose: 1 tablet daily      500 mcg by mouth daily         Vitamin D3/K2 Liquid

## 2021-11-18 NOTE — PATIENT INSTRUCTIONS
Follow up with physical therapy as directed     Supplement/Nutrient Support:        Ortho Biotic by [employer]   Dose: 1 tablet daily      500 mcg by mouth daily         Vitamin D3/K2 Liquid by [employer]   1 drop contains D3 1000 units and

## 2021-11-29 ENCOUNTER — HOSPITAL ENCOUNTER (OUTPATIENT)
Dept: PHYSICAL MEDICINE AND REHAB | Age: 86
Discharge: STILL ADMITTED OR ADMINISTRATIVE | End: 2021-11-29
Attending: FAMILY MEDICINE

## 2021-11-29 PROCEDURE — 97161 PT EVAL LOW COMPLEX 20 MIN: CPT

## 2021-11-29 ASSESSMENT — ENCOUNTER SYMPTOMS
ALLEVIATING FACTORS: RELAXATION TECHNIQUES
ALLEVIATING FACTORS: AVOIDING MOVEMENT IN INVOLVED AREA
QUALITY: DISCOMFORT
PAIN SCALE AT LOWEST: 0
QUALITY: ACHE
QUALITY: STIFF
ALLEVIATING FACTORS: SUPPORTIVE DEVICE
PAIN SEVERITY NOW: 2
PAIN SCALE AT HIGHEST: 5
ALLEVIATING FACTORS: REST
PAIN FREQUENCY: INTERMITTENT

## 2021-12-09 ENCOUNTER — OFFICE VISIT (OUTPATIENT)
Dept: SPORTS MEDICINE | Age: 86
End: 2021-12-09

## 2021-12-09 ENCOUNTER — IMAGING SERVICES (OUTPATIENT)
Dept: GENERAL RADIOLOGY | Age: 86
End: 2021-12-09
Attending: FAMILY MEDICINE

## 2021-12-09 VITALS — BODY MASS INDEX: 21.77 KG/M2 | HEIGHT: 59 IN | WEIGHT: 108 LBS

## 2021-12-09 DIAGNOSIS — S42.222A CLOSED 2-PART DISPLACED FRACTURE OF SURGICAL NECK OF LEFT HUMERUS, INITIAL ENCOUNTER: ICD-10-CM

## 2021-12-09 DIAGNOSIS — M25.622 DECREASED RANGE OF MOTION OF LEFT ELBOW: ICD-10-CM

## 2021-12-09 DIAGNOSIS — S42.252D CLOSED DISPLACED FRACTURE OF GREATER TUBEROSITY OF LEFT HUMERUS WITH ROUTINE HEALING, SUBSEQUENT ENCOUNTER: ICD-10-CM

## 2021-12-09 DIAGNOSIS — M25.612 DECREASED RANGE OF MOTION OF LEFT SHOULDER: ICD-10-CM

## 2021-12-09 DIAGNOSIS — M79.675 GREAT TOE PAIN, LEFT: ICD-10-CM

## 2021-12-09 DIAGNOSIS — S42.252D CLOSED DISPLACED FRACTURE OF GREATER TUBEROSITY OF LEFT HUMERUS WITH ROUTINE HEALING, SUBSEQUENT ENCOUNTER: Primary | ICD-10-CM

## 2021-12-09 PROCEDURE — 73030 X-RAY EXAM OF SHOULDER: CPT | Performed by: FAMILY MEDICINE

## 2021-12-09 PROCEDURE — 99213 OFFICE O/P EST LOW 20 MIN: CPT | Performed by: FAMILY MEDICINE

## 2021-12-09 ASSESSMENT — ENCOUNTER SYMPTOMS
FATIGUE: 0
EYE REDNESS: 0
BACK PAIN: 0
DIZZINESS: 0
NERVOUS/ANXIOUS: 0
ACTIVITY CHANGE: 0
SHORTNESS OF BREATH: 0
CONSTIPATION: 0
NAUSEA: 0
NUMBNESS: 0
WHEEZING: 0
SEIZURES: 0
HEADACHES: 0
SORE THROAT: 0
ABDOMINAL PAIN: 0
DIARRHEA: 1
CHEST TIGHTNESS: 0
TROUBLE SWALLOWING: 0
SLEEP DISTURBANCE: 0
FEVER: 0
PHOTOPHOBIA: 0

## 2021-12-13 ENCOUNTER — HOSPITAL ENCOUNTER (OUTPATIENT)
Dept: PHYSICAL MEDICINE AND REHAB | Age: 86
Discharge: STILL ADMITTED OR ADMINISTRATIVE | End: 2021-12-13
Attending: FAMILY MEDICINE

## 2021-12-13 PROCEDURE — 97110 THERAPEUTIC EXERCISES: CPT

## 2021-12-13 ASSESSMENT — ENCOUNTER SYMPTOMS
PAIN SCALE AT HIGHEST: 5
PAIN SCALE AT LOWEST: 2
PAIN FREQUENCY: INTERMITTENT
PAIN SEVERITY NOW: 2

## 2021-12-15 ENCOUNTER — APPOINTMENT (OUTPATIENT)
Dept: PHYSICAL MEDICINE AND REHAB | Age: 86
End: 2021-12-15

## 2021-12-20 ENCOUNTER — HOSPITAL ENCOUNTER (OUTPATIENT)
Dept: PHYSICAL MEDICINE AND REHAB | Age: 86
Discharge: STILL ADMITTED OR ADMINISTRATIVE | End: 2021-12-20
Attending: FAMILY MEDICINE

## 2021-12-20 PROCEDURE — 97110 THERAPEUTIC EXERCISES: CPT | Performed by: PHYSICAL THERAPY ASSISTANT

## 2021-12-27 ENCOUNTER — APPOINTMENT (OUTPATIENT)
Dept: PHYSICAL MEDICINE AND REHAB | Age: 86
End: 2021-12-27

## 2021-12-31 ASSESSMENT — ENCOUNTER SYMPTOMS
EYE REDNESS: 0
TROUBLE SWALLOWING: 0
FEVER: 0
ABDOMINAL PAIN: 0
NUMBNESS: 0
SEIZURES: 0
HEADACHES: 0
DIARRHEA: 0
WHEEZING: 0
SHORTNESS OF BREATH: 0
DIZZINESS: 0
NAUSEA: 0
SLEEP DISTURBANCE: 0
PHOTOPHOBIA: 0
ACTIVITY CHANGE: 0
NERVOUS/ANXIOUS: 0
BACK PAIN: 0
CHEST TIGHTNESS: 0
CONSTIPATION: 0
FATIGUE: 0

## 2022-01-03 ENCOUNTER — OFFICE VISIT (OUTPATIENT)
Dept: SPORTS MEDICINE | Age: 87
End: 2022-01-03

## 2022-01-03 ENCOUNTER — IMAGING SERVICES (OUTPATIENT)
Dept: GENERAL RADIOLOGY | Age: 87
End: 2022-01-03
Attending: FAMILY MEDICINE

## 2022-01-03 ENCOUNTER — APPOINTMENT (OUTPATIENT)
Dept: PHYSICAL MEDICINE AND REHAB | Age: 87
End: 2022-01-03
Attending: FAMILY MEDICINE

## 2022-01-03 VITALS
BODY MASS INDEX: 20.36 KG/M2 | WEIGHT: 101 LBS | HEIGHT: 59 IN | SYSTOLIC BLOOD PRESSURE: 145 MMHG | HEART RATE: 94 BPM | DIASTOLIC BLOOD PRESSURE: 82 MMHG

## 2022-01-03 DIAGNOSIS — S42.252D CLOSED DISPLACED FRACTURE OF GREATER TUBEROSITY OF LEFT HUMERUS WITH ROUTINE HEALING, SUBSEQUENT ENCOUNTER: ICD-10-CM

## 2022-01-03 DIAGNOSIS — S42.252D CLOSED DISPLACED FRACTURE OF GREATER TUBEROSITY OF LEFT HUMERUS WITH ROUTINE HEALING, SUBSEQUENT ENCOUNTER: Primary | ICD-10-CM

## 2022-01-03 DIAGNOSIS — M25.612 DECREASED RANGE OF MOTION OF LEFT SHOULDER: ICD-10-CM

## 2022-01-03 PROCEDURE — 99213 OFFICE O/P EST LOW 20 MIN: CPT | Performed by: FAMILY MEDICINE

## 2022-01-03 PROCEDURE — 73030 X-RAY EXAM OF SHOULDER: CPT | Performed by: FAMILY MEDICINE

## 2022-01-03 ASSESSMENT — ENCOUNTER SYMPTOMS
WOUND: 0
SINUS PRESSURE: 0
SINUS PAIN: 0
AGITATION: 0
VOMITING: 0
SORE THROAT: 1
EYE PAIN: 0
APPETITE CHANGE: 0

## 2022-01-07 ENCOUNTER — APPOINTMENT (OUTPATIENT)
Dept: PHYSICAL MEDICINE AND REHAB | Age: 87
End: 2022-01-07

## 2022-01-10 ENCOUNTER — APPOINTMENT (OUTPATIENT)
Dept: PHYSICAL MEDICINE AND REHAB | Age: 87
End: 2022-01-10

## 2022-01-14 ENCOUNTER — APPOINTMENT (OUTPATIENT)
Dept: PHYSICAL MEDICINE AND REHAB | Age: 87
End: 2022-01-14

## 2022-02-10 ENCOUNTER — NURSE TRIAGE (OUTPATIENT)
Dept: SCHEDULING | Age: 87
End: 2022-02-10

## 2022-03-21 PROBLEM — E46 PROTEIN-CALORIE MALNUTRITION, UNSPECIFIED SEVERITY (HCC): Status: ACTIVE | Noted: 2022-03-21

## 2022-05-21 ENCOUNTER — TELEPHONE (OUTPATIENT)
Dept: INTEGRATIVE MEDICINE | Facility: CLINIC | Age: 87
End: 2022-05-21

## 2023-06-23 ENCOUNTER — TELEPHONE (OUTPATIENT)
Dept: INTEGRATIVE MEDICINE | Facility: CLINIC | Age: 88
End: 2023-06-23

## 2023-09-09 ENCOUNTER — APPOINTMENT (OUTPATIENT)
Dept: GENERAL RADIOLOGY | Age: 88
End: 2023-09-09
Attending: EMERGENCY MEDICINE

## 2023-09-09 ENCOUNTER — APPOINTMENT (OUTPATIENT)
Dept: CT IMAGING | Age: 88
End: 2023-09-09
Attending: EMERGENCY MEDICINE

## 2023-09-09 ENCOUNTER — HOSPITAL ENCOUNTER (OUTPATIENT)
Age: 88
Setting detail: OBSERVATION
Discharge: HOME | End: 2023-09-11
Attending: EMERGENCY MEDICINE | Admitting: INTERNAL MEDICINE

## 2023-09-09 ENCOUNTER — WALK IN (OUTPATIENT)
Dept: URGENT CARE | Age: 88
End: 2023-09-09
Attending: EMERGENCY MEDICINE

## 2023-09-09 VITALS
DIASTOLIC BLOOD PRESSURE: 82 MMHG | BODY MASS INDEX: 19.79 KG/M2 | OXYGEN SATURATION: 95 % | SYSTOLIC BLOOD PRESSURE: 132 MMHG | RESPIRATION RATE: 16 BRPM | TEMPERATURE: 98.7 F | HEART RATE: 80 BPM | WEIGHT: 98 LBS

## 2023-09-09 DIAGNOSIS — S22.080A CLOSED WEDGE COMPRESSION FRACTURE OF T11 VERTEBRA, INITIAL ENCOUNTER (CMD): ICD-10-CM

## 2023-09-09 DIAGNOSIS — S41.112A SKIN TEAR OF LEFT UPPER ARM WITHOUT COMPLICATION, INITIAL ENCOUNTER: ICD-10-CM

## 2023-09-09 DIAGNOSIS — S29.9XXA INJURY OF CHEST WALL, INITIAL ENCOUNTER: ICD-10-CM
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
## 2023-09-09 DIAGNOSIS — S22.42XA CLOSED FRACTURE OF MULTIPLE RIBS OF LEFT SIDE, INITIAL ENCOUNTER: Primary | ICD-10-CM

## 2023-09-09 DIAGNOSIS — S22.080A COMPRESSION FRACTURE OF T11 VERTEBRA, INITIAL ENCOUNTER (CMD): ICD-10-CM

## 2023-09-09 DIAGNOSIS — S39.91XA BLUNT TRAUMA TO ABDOMEN, INITIAL ENCOUNTER: Primary | ICD-10-CM

## 2023-09-09 LAB
ALBUMIN SERPL-MCNC: 3.7 G/DL (ref 3.6–5.1)
ALBUMIN/GLOB SERPL: 1 {RATIO} (ref 1–2.4)
ALP SERPL-CCNC: 54 UNITS/L (ref 45–117)
ALT SERPL-CCNC: 15 UNITS/L
ANION GAP SERPL CALC-SCNC: 9 MMOL/L (ref 7–19)
AST SERPL-CCNC: 27 UNITS/L
BASOPHILS # BLD: 0 K/MCL (ref 0–0.3)
BASOPHILS NFR BLD: 0 %
BILIRUB SERPL-MCNC: 0.8 MG/DL (ref 0.2–1)
BUN SERPL-MCNC: 14 MG/DL (ref 6–20)
BUN/CREAT SERPL: 21 (ref 7–25)
CALCIUM SERPL-MCNC: 8.8 MG/DL (ref 8.4–10.2)
CHLORIDE SERPL-SCNC: 104 MMOL/L (ref 97–110)
CO2 SERPL-SCNC: 28 MMOL/L (ref 21–32)
CREAT SERPL-MCNC: 0.68 MG/DL (ref 0.51–0.95)
DEPRECATED RDW RBC: 41.2 FL (ref 39–50)
EGFRCR SERPLBLD CKD-EPI 2021: 82 ML/MIN/{1.73_M2}
EOSINOPHIL # BLD: 0.1 K/MCL (ref 0–0.5)
EOSINOPHIL NFR BLD: 2 %
ERYTHROCYTE [DISTWIDTH] IN BLOOD: 12.9 % (ref 11–15)
FASTING DURATION TIME PATIENT: NORMAL H
GLOBULIN SER-MCNC: 3.6 G/DL (ref 2–4)
GLUCOSE SERPL-MCNC: 87 MG/DL (ref 70–99)
HCT VFR BLD CALC: 40.3 % (ref 36–46.5)
HGB BLD-MCNC: 13.2 G/DL (ref 12–15.5)
IMM GRANULOCYTES # BLD AUTO: 0 K/MCL (ref 0–0.2)
IMM GRANULOCYTES # BLD: 0 %
LYMPHOCYTES # BLD: 1 K/MCL (ref 1–4)
LYMPHOCYTES NFR BLD: 16 %
MCH RBC QN AUTO: 28.7 PG (ref 26–34)
MCHC RBC AUTO-ENTMCNC: 32.8 G/DL (ref 32–36.5)
MCV RBC AUTO: 87.6 FL (ref 78–100)
MONOCYTES # BLD: 0.6 K/MCL (ref 0.3–0.9)
MONOCYTES NFR BLD: 9 %
NEUTROPHILS # BLD: 4.8 K/MCL (ref 1.8–7.7)
NEUTROPHILS NFR BLD: 73 %
NRBC BLD MANUAL-RTO: 0 /100 WBC
PLATELET # BLD AUTO: 199 K/MCL (ref 140–450)
POTASSIUM SERPL-SCNC: 3.8 MMOL/L (ref 3.4–5.1)
PROT SERPL-MCNC: 7.3 G/DL (ref 6.4–8.2)
RBC # BLD: 4.6 MIL/MCL (ref 4–5.2)
SODIUM SERPL-SCNC: 137 MMOL/L (ref 135–145)
WBC # BLD: 6.6 K/MCL (ref 4.2–11)

## 2023-09-09 PROCEDURE — G1004 CDSM NDSC: HCPCS

## 2023-09-09 PROCEDURE — 73090 X-RAY EXAM OF FOREARM: CPT

## 2023-09-09 PROCEDURE — G0378 HOSPITAL OBSERVATION PER HR: HCPCS

## 2023-09-09 PROCEDURE — 71101 X-RAY EXAM UNILAT RIBS/CHEST: CPT

## 2023-09-09 PROCEDURE — 80053 COMPREHEN METABOLIC PANEL: CPT | Performed by: EMERGENCY MEDICINE

## 2023-09-09 PROCEDURE — 71250 CT THORAX DX C-: CPT

## 2023-09-09 PROCEDURE — 85025 COMPLETE CBC W/AUTO DIFF WBC: CPT | Performed by: EMERGENCY MEDICINE

## 2023-09-09 PROCEDURE — 10002803 HB RX 637: Performed by: EMERGENCY MEDICINE

## 2023-09-09 PROCEDURE — 74176 CT ABD & PELVIS W/O CONTRAST: CPT

## 2023-09-09 PROCEDURE — 99284 EMERGENCY DEPT VISIT MOD MDM: CPT

## 2023-09-09 PROCEDURE — 10004651 HB RX, NO CHARGE ITEM: Performed by: INTERNAL MEDICINE

## 2023-09-09 PROCEDURE — 99215 OFFICE O/P EST HI 40 MIN: CPT

## 2023-09-09 PROCEDURE — 10004651 HB RX, NO CHARGE ITEM: Performed by: EMERGENCY MEDICINE

## 2023-09-09 RX ORDER — ACETAMINOPHEN 325 MG/1
650 TABLET ORAL EVERY 4 HOURS PRN
Status: DISCONTINUED | OUTPATIENT
Start: 2023-09-09 | End: 2023-09-11 | Stop reason: HOSPADM

## 2023-09-09 RX ORDER — HYDROCODONE BITARTRATE AND ACETAMINOPHEN 5; 325 MG/1; MG/1
1 TABLET ORAL EVERY 4 HOURS PRN
Status: DISCONTINUED | OUTPATIENT
Start: 2023-09-09 | End: 2023-09-11 | Stop reason: HOSPADM

## 2023-09-09 RX ORDER — PROCHLORPERAZINE EDISYLATE 5 MG/ML
5 INJECTION INTRAMUSCULAR; INTRAVENOUS EVERY 4 HOURS PRN
Status: DISCONTINUED | OUTPATIENT
Start: 2023-09-09 | End: 2023-09-11 | Stop reason: HOSPADM

## 2023-09-09 RX ORDER — HYDRALAZINE HYDROCHLORIDE 20 MG/ML
10 INJECTION INTRAMUSCULAR; INTRAVENOUS EVERY 6 HOURS PRN
Status: DISCONTINUED | OUTPATIENT
Start: 2023-09-09 | End: 2023-09-11 | Stop reason: HOSPADM

## 2023-09-09 RX ORDER — HEPARIN SODIUM 5000 [USP'U]/ML
5000 INJECTION, SOLUTION INTRAVENOUS; SUBCUTANEOUS EVERY 12 HOURS SCHEDULED
Status: DISCONTINUED | OUTPATIENT
Start: 2023-09-09 | End: 2023-09-11 | Stop reason: HOSPADM

## 2023-09-09 RX ORDER — IBUPROFEN 400 MG/1
400 TABLET ORAL ONCE
Status: COMPLETED | OUTPATIENT
Start: 2023-09-09 | End: 2023-09-09

## 2023-09-09 RX ORDER — AMOXICILLIN 250 MG
2 CAPSULE ORAL DAILY PRN
Status: DISCONTINUED | OUTPATIENT
Start: 2023-09-09 | End: 2023-09-11 | Stop reason: HOSPADM

## 2023-09-09 RX ORDER — 0.9 % SODIUM CHLORIDE 0.9 %
2 VIAL (ML) INJECTION EVERY 12 HOURS SCHEDULED
Status: DISCONTINUED | OUTPATIENT
Start: 2023-09-09 | End: 2023-09-11 | Stop reason: HOSPADM

## 2023-09-09 RX ORDER — ACETAMINOPHEN 325 MG/1
650 TABLET ORAL ONCE
Status: COMPLETED | OUTPATIENT
Start: 2023-09-09 | End: 2023-09-09

## 2023-09-09 RX ORDER — MAGNESIUM HYDROXIDE/ALUMINUM HYDROXICE/SIMETHICONE 120; 1200; 1200 MG/30ML; MG/30ML; MG/30ML
30 SUSPENSION ORAL EVERY 4 HOURS PRN
Status: DISCONTINUED | OUTPATIENT
Start: 2023-09-09 | End: 2023-09-11 | Stop reason: HOSPADM

## 2023-09-09 RX ORDER — LIDOCAINE 4 G/G
1 PATCH TOPICAL ONCE
Status: COMPLETED | OUTPATIENT
Start: 2023-09-09 | End: 2023-09-10

## 2023-09-09 RX ADMIN — SODIUM CHLORIDE, PRESERVATIVE FREE 2 ML: 5 INJECTION INTRAVENOUS at 20:57

## 2023-09-09 RX ADMIN — ACETAMINOPHEN 650 MG: 325 TABLET ORAL at 15:36

## 2023-09-09 RX ADMIN — IBUPROFEN 400 MG: 400 TABLET, FILM COATED ORAL at 15:35

## 2023-09-09 RX ADMIN — LIDOCAINE 1 PATCH: 4 PATCH TOPICAL at 15:35

## 2023-09-09 SDOH — HEALTH STABILITY: PHYSICAL HEALTH: DO YOU HAVE SERIOUS DIFFICULTY WALKING OR CLIMBING STAIRS?: NO

## 2023-09-09 SDOH — ECONOMIC STABILITY: HOUSING INSECURITY: WHAT IS YOUR LIVING SITUATION TODAY?: CONDO

## 2023-09-09 SDOH — SOCIAL STABILITY: SOCIAL NETWORK
HOW OFTEN DO YOU SEE OR TALK TO PEOPLE THAT YOU CARE ABOUT AND FEEL CLOSE TO? (FOR EXAMPLE: TALKING TO FRIENDS ON THE PHONE, VISITING FRIENDS OR FAMILY, GOING TO CHURCH OR CLUB MEETINGS): 5 OR MORE TIMES A WEEK

## 2023-09-09 SDOH — HEALTH STABILITY: GENERAL
BECAUSE OF A PHYSICAL, MENTAL, OR EMOTIONAL CONDITION, DO YOU HAVE SERIOUS DIFFICULTY CONCENTRATING, REMEMBERING OR MAKING DECISIONS?: YES

## 2023-09-09 SDOH — HEALTH STABILITY: PHYSICAL HEALTH: DO YOU HAVE DIFFICULTY DRESSING OR BATHING?: NO

## 2023-09-09 SDOH — SOCIAL STABILITY: SOCIAL NETWORK: SUPPORT SYSTEMS: FAMILY MEMBERS;FRIENDS

## 2023-09-09 SDOH — ECONOMIC STABILITY: GENERAL

## 2023-09-09 SDOH — ECONOMIC STABILITY: TRANSPORTATION INSECURITY
IN THE PAST 12 MONTHS, HAS THE LACK OF TRANSPORTATION KEPT YOU FROM MEDICAL APPOINTMENTS OR FROM GETTING MEDICATIONS?: NO

## 2023-09-09 SDOH — HEALTH STABILITY: GENERAL: BECAUSE OF A PHYSICAL, MENTAL, OR EMOTIONAL CONDITION, DO YOU HAVE DIFFICULTY DOING ERRANDS ALONE?: YES

## 2023-09-09 SDOH — ECONOMIC STABILITY: HOUSING INSECURITY: ARE YOU WORRIED ABOUT LOSING YOUR HOUSING?: NO

## 2023-09-09 SDOH — ECONOMIC STABILITY: FOOD INSECURITY: HOW OFTEN IN THE PAST 12 MONTHS WERE YOU WORRIED OR STRESSED ABOUT HAVING ENOUGH MONEY TO BUY NUTRITIOUS MEALS?: NEVER

## 2023-09-09 SDOH — ECONOMIC STABILITY: TRANSPORTATION INSECURITY
IN THE PAST 12 MONTHS, HAS LACK OF TRANSPORTATION KEPT YOU FROM MEETINGS, WORK, OR FROM GETTING THINGS NEEDED FOR DAILY LIVING?: NO

## 2023-09-09 SDOH — ECONOMIC STABILITY: HOUSING INSECURITY: WHAT IS YOUR LIVING SITUATION TODAY?: ALONE

## 2023-09-09 ASSESSMENT — LIFESTYLE VARIABLES
ALCOHOL_USE_STATUS: NO OR LOW RISK WITH VALIDATED TOOL
HOW OFTEN DO YOU HAVE A DRINK CONTAINING ALCOHOL: MONTHLY OR LESS
HOW MANY STANDARD DRINKS CONTAINING ALCOHOL DO YOU HAVE ON A TYPICAL DAY: 0,1 OR 2
AUDIT-C TOTAL SCORE: 1
HOW OFTEN DO YOU HAVE 6 OR MORE DRINKS ON ONE OCCASION: NEVER

## 2023-09-09 ASSESSMENT — ACTIVITIES OF DAILY LIVING (ADL)
ADL_SCORE: 12
ADL_BEFORE_ADMISSION: INDEPENDENT
RECENT_DECLINE_ADL: NO
ADL_SHORT_OF_BREATH: NO

## 2023-09-09 ASSESSMENT — PAIN SCALES - PAIN ASSESSMENT IN ADVANCED DEMENTIA (PAINAD)
TOTALSCORE: 0
BREATHING: NORMAL
CONSOLABILITY: NO NEED TO CONSOLE
BODYLANGUAGE: RELAXED
FACIALEXPRESSION: SMILING OR INEXPRESSIVE

## 2023-09-09 ASSESSMENT — PATIENT HEALTH QUESTIONNAIRE - PHQ9: IS PATIENT ABLE TO COMPLETE PHQ2 OR PHQ9: NO, DEFER TO LATER TIME

## 2023-09-09 ASSESSMENT — COLUMBIA-SUICIDE SEVERITY RATING SCALE - C-SSRS: IS THE PATIENT ABLE TO COMPLETE C-SSRS: NO, DEFER TO LATER TIME;NO, DEFER FOR ACUTE CONFUSION

## 2023-09-09 ASSESSMENT — PAIN SCALES - GENERAL
PAINLEVEL_OUTOF10: 8
PAINLEVEL_OUTOF10: 8

## 2023-09-09 ASSESSMENT — PAIN SCALES - WONG BAKER: WONGBAKER_NUMERICALRESPONSE: 5

## 2023-09-10 ENCOUNTER — APPOINTMENT (OUTPATIENT)
Dept: MRI IMAGING | Age: 88
End: 2023-09-10
Attending: NURSE PRACTITIONER

## 2023-09-10 LAB
BASOPHILS # BLD: 0 K/MCL (ref 0–0.3)
BASOPHILS NFR BLD: 0 %
DEPRECATED RDW RBC: 41.3 FL (ref 39–50)
EOSINOPHIL # BLD: 0.1 K/MCL (ref 0–0.5)
EOSINOPHIL NFR BLD: 2 %
ERYTHROCYTE [DISTWIDTH] IN BLOOD: 12.8 % (ref 11–15)
HCT VFR BLD CALC: 39.7 % (ref 36–46.5)
HGB BLD-MCNC: 13 G/DL (ref 12–15.5)
IMM GRANULOCYTES # BLD AUTO: 0 K/MCL (ref 0–0.2)
IMM GRANULOCYTES # BLD: 1 %
LYMPHOCYTES # BLD: 0.9 K/MCL (ref 1–4)
LYMPHOCYTES NFR BLD: 14 %
MCH RBC QN AUTO: 28.7 PG (ref 26–34)
MCHC RBC AUTO-ENTMCNC: 32.7 G/DL (ref 32–36.5)
MCV RBC AUTO: 87.6 FL (ref 78–100)
MONOCYTES # BLD: 0.5 K/MCL (ref 0.3–0.9)
MONOCYTES NFR BLD: 8 %
NEUTROPHILS # BLD: 4.9 K/MCL (ref 1.8–7.7)
NEUTROPHILS NFR BLD: 75 %
NRBC BLD MANUAL-RTO: 0 /100 WBC
PLATELET # BLD AUTO: 194 K/MCL (ref 140–450)
RAINBOW EXTRA TUBES HOLD SPECIMEN: NORMAL
RBC # BLD: 4.53 MIL/MCL (ref 4–5.2)
WBC # BLD: 6.4 K/MCL (ref 4.2–11)

## 2023-09-10 PROCEDURE — 97535 SELF CARE MNGMENT TRAINING: CPT

## 2023-09-10 PROCEDURE — G0378 HOSPITAL OBSERVATION PER HR: HCPCS

## 2023-09-10 PROCEDURE — 72146 MRI CHEST SPINE W/O DYE: CPT

## 2023-09-10 PROCEDURE — 10002800 HB RX 250 W HCPCS: Performed by: INTERNAL MEDICINE

## 2023-09-10 PROCEDURE — 36415 COLL VENOUS BLD VENIPUNCTURE: CPT | Performed by: INTERNAL MEDICINE

## 2023-09-10 PROCEDURE — 96372 THER/PROPH/DIAG INJ SC/IM: CPT | Performed by: INTERNAL MEDICINE

## 2023-09-10 PROCEDURE — 13003289 HB OXYGEN THERAPY DAILY

## 2023-09-10 PROCEDURE — 99222 1ST HOSP IP/OBS MODERATE 55: CPT | Performed by: PHYSICIAN ASSISTANT

## 2023-09-10 PROCEDURE — 85025 COMPLETE CBC W/AUTO DIFF WBC: CPT | Performed by: INTERNAL MEDICINE

## 2023-09-10 PROCEDURE — G1004 CDSM NDSC: HCPCS

## 2023-09-10 PROCEDURE — 97166 OT EVAL MOD COMPLEX 45 MIN: CPT

## 2023-09-10 PROCEDURE — 10004651 HB RX, NO CHARGE ITEM: Performed by: INTERNAL MEDICINE

## 2023-09-10 PROCEDURE — 96374 THER/PROPH/DIAG INJ IV PUSH: CPT

## 2023-09-10 RX ORDER — LORAZEPAM 2 MG/ML
2 INJECTION INTRAMUSCULAR ONCE
Status: COMPLETED | OUTPATIENT
Start: 2023-09-10 | End: 2023-09-10

## 2023-09-10 RX ADMIN — SODIUM CHLORIDE, PRESERVATIVE FREE 2 ML: 5 INJECTION INTRAVENOUS at 08:09

## 2023-09-10 RX ADMIN — HEPARIN SODIUM 5000 UNITS: 5000 INJECTION INTRAVENOUS; SUBCUTANEOUS at 22:04

## 2023-09-10 RX ADMIN — HEPARIN SODIUM 5000 UNITS: 5000 INJECTION INTRAVENOUS; SUBCUTANEOUS at 08:09

## 2023-09-10 RX ADMIN — LORAZEPAM 2 MG: 2 INJECTION INTRAMUSCULAR; INTRAVENOUS at 11:12

## 2023-09-10 RX ADMIN — SODIUM CHLORIDE, PRESERVATIVE FREE 2 ML: 5 INJECTION INTRAVENOUS at 22:08

## 2023-09-10 ASSESSMENT — ACTIVITIES OF DAILY LIVING (ADL)
EATING: MODIFIED INDEPENDENT
GROOMING: MODIFIED INDEPENDENT
PRIOR_ADL_BATHING: MODIFIED INDEPENDENT
PRIOR_ADL_TOILETING: MODIFIED INDEPENDENT
HOME_MANAGEMENT_TIME_ENTRY: 16
PRIOR_ADL: MODIFIED INDEPENDENT

## 2023-09-10 ASSESSMENT — ENCOUNTER SYMPTOMS
DIETARY ISSUES: ADEQUATE INTAKE
PAIN SEVERITY NOW: 0
ACTIVITY IMPAIRMENT: IMPAIRED DUE TO PAIN
SHORTNESS OF BREATH: 0

## 2023-09-10 ASSESSMENT — PAIN SCALES - PAIN ASSESSMENT IN ADVANCED DEMENTIA (PAINAD)
CONSOLABILITY: NO NEED TO CONSOLE
CONSOLABILITY: NO NEED TO CONSOLE
BODYLANGUAGE: RELAXED
FACIALEXPRESSION: SAD. FRIGHTENED. FROWNING
BREATHING: NORMAL
TOTALSCORE: 0
TOTALSCORE: 2
BODYLANGUAGE: RELAXED
BREATHING: NORMAL
CONSOLABILITY: NO NEED TO CONSOLE
BREATHING: NORMAL
FACIALEXPRESSION: SAD. FRIGHTENED. FROWNING
FACIALEXPRESSION: SMILING OR INEXPRESSIVE
BODYLANGUAGE: RELAXED
NEGVOCALIZATION: OCCASIONAL MOAN OR GROAN, LOW LEVELS OF SPEECH WITH A NEGATIVE OR DISAPPROVING QUALITY
TOTALSCORE: 1

## 2023-09-10 ASSESSMENT — COGNITIVE AND FUNCTIONAL STATUS - GENERAL
DAILY_ACTIVITY_RAW_SCORE: 20
DAILY_ACTIVITY_CONVERTED_SCORE: 42.03
HELP NEEDED DRESSING REGULAR UPPER BODY CLOTHING: A LITTLE
HELP NEEDED FOR TOILETING: A LITTLE
HELP NEEDED FOR BATHING: A LITTLE
HELP NEEDED DRESSING REGULAR LOWER BODY CLOTHING: A LITTLE

## 2023-09-10 ASSESSMENT — PAIN SCALES - WONG BAKER
WONGBAKER_NUMERICALRESPONSE: 0
WONGBAKER_NUMERICALRESPONSE: 0

## 2023-09-10 ASSESSMENT — PAIN SCALES - GENERAL
PAINLEVEL_OUTOF10: 0
PAINLEVEL_OUTOF10: 0

## 2023-09-11 ENCOUNTER — APPOINTMENT (OUTPATIENT)
Dept: GENERAL RADIOLOGY | Age: 88
End: 2023-09-11
Attending: PHYSICIAN ASSISTANT

## 2023-09-11 ENCOUNTER — TELEPHONE (OUTPATIENT)
Dept: NEUROSURGERY | Age: 88
End: 2023-09-11

## 2023-09-11 VITALS
OXYGEN SATURATION: 96 % | HEIGHT: 59 IN | BODY MASS INDEX: 19.64 KG/M2 | WEIGHT: 97.44 LBS | TEMPERATURE: 97.7 F | DIASTOLIC BLOOD PRESSURE: 78 MMHG | RESPIRATION RATE: 16 BRPM | SYSTOLIC BLOOD PRESSURE: 125 MMHG | HEART RATE: 83 BPM

## 2023-09-11 PROCEDURE — 97530 THERAPEUTIC ACTIVITIES: CPT

## 2023-09-11 PROCEDURE — 10004651 HB RX, NO CHARGE ITEM: Performed by: INTERNAL MEDICINE

## 2023-09-11 PROCEDURE — 99232 SBSQ HOSP IP/OBS MODERATE 35: CPT

## 2023-09-11 PROCEDURE — G0378 HOSPITAL OBSERVATION PER HR: HCPCS

## 2023-09-11 PROCEDURE — 97116 GAIT TRAINING THERAPY: CPT

## 2023-09-11 PROCEDURE — 97162 PT EVAL MOD COMPLEX 30 MIN: CPT

## 2023-09-11 RX ORDER — ACETAMINOPHEN 325 MG/1
650 TABLET ORAL EVERY 4 HOURS PRN
Qty: 100 TABLET | Refills: 0 | Status: SHIPPED | OUTPATIENT
Start: 2023-09-11 | End: 2023-10-18

## 2023-09-11 RX ADMIN — SODIUM CHLORIDE, PRESERVATIVE FREE 2 ML: 5 INJECTION INTRAVENOUS at 08:59

## 2023-09-11 ASSESSMENT — COGNITIVE AND FUNCTIONAL STATUS - GENERAL
BECAUSE OF A PHYSICAL, MENTAL, OR EMOTIONAL CONDITION, DO YOU HAVE SERIOUS DIFFICULTY CONCENTRATING, REMEMBERING OR MAKING DECISIONS: YES
DO YOU HAVE DIFFICULTY DRESSING OR BATHING: NO
BASIC_MOBILITY_CONVERTED_SCORE: 39.67
BECAUSE OF A PHYSICAL, MENTAL, OR EMOTIONAL CONDITION, DO YOU HAVE DIFFICULTY DOING ERRANDS ALONE: YES
BASIC_MOBILITY_RAW_SCORE: 17
DO YOU HAVE SERIOUS DIFFICULTY WALKING OR CLIMBING STAIRS: NO

## 2023-09-18 ENCOUNTER — OFFICE VISIT (OUTPATIENT)
Dept: PHYSICAL MEDICINE AND REHAB | Facility: CLINIC | Age: 88
End: 2023-09-18
Payer: MEDICARE

## 2023-09-18 VITALS — SYSTOLIC BLOOD PRESSURE: 100 MMHG | DIASTOLIC BLOOD PRESSURE: 66 MMHG

## 2023-09-18 DIAGNOSIS — L03.119 CELLULITIS OF LOWER EXTREMITY, UNSPECIFIED LATERALITY: Primary | ICD-10-CM

## 2023-09-18 DIAGNOSIS — G30.1 MODERATE LATE ONSET ALZHEIMER'S DEMENTIA WITHOUT BEHAVIORAL DISTURBANCE, PSYCHOTIC DISTURBANCE, MOOD DISTURBANCE, OR ANXIETY (HCC): ICD-10-CM

## 2023-09-18 DIAGNOSIS — S22.080D COMPRESSION FRACTURE OF T11 VERTEBRA WITH ROUTINE HEALING: ICD-10-CM

## 2023-09-18 DIAGNOSIS — F02.B0 MODERATE LATE ONSET ALZHEIMER'S DEMENTIA WITHOUT BEHAVIORAL DISTURBANCE, PSYCHOTIC DISTURBANCE, MOOD DISTURBANCE, OR ANXIETY (HCC): ICD-10-CM

## 2023-09-18 PROBLEM — L03.90 CELLULITIS: Status: ACTIVE | Noted: 2023-09-18

## 2023-09-18 PROCEDURE — 99204 OFFICE O/P NEW MOD 45 MIN: CPT | Performed by: PHYSICAL MEDICINE & REHABILITATION

## 2023-09-18 RX ORDER — AMOXICILLIN 400 MG/5ML
400 POWDER, FOR SUSPENSION ORAL 3 TIMES DAILY
Qty: 225 ML | Refills: 0 | Status: SHIPPED | OUTPATIENT
Start: 2023-09-18

## 2023-09-26 PROBLEM — S22.080D COMPRESSION FRACTURE OF T11 VERTEBRA WITH ROUTINE HEALING: Status: ACTIVE | Noted: 2023-09-26

## 2023-09-26 PROBLEM — F02.B0 MODERATE LATE ONSET ALZHEIMER'S DEMENTIA WITHOUT BEHAVIORAL DISTURBANCE, PSYCHOTIC DISTURBANCE, MOOD DISTURBANCE, OR ANXIETY (HCC): Status: ACTIVE | Noted: 2023-09-26

## 2023-09-26 PROBLEM — G30.1 MODERATE LATE ONSET ALZHEIMER'S DEMENTIA WITHOUT BEHAVIORAL DISTURBANCE, PSYCHOTIC DISTURBANCE, MOOD DISTURBANCE, OR ANXIETY (HCC): Status: ACTIVE | Noted: 2023-09-26

## 2023-09-26 NOTE — PROGRESS NOTES
Low Back Pain H & P    Chief Complaint:  Patient presents with:  New Patient: Nazario Samaniego. Pt's daughter reports patient fell last Saturday and presents with multiple bruises and scrapes, one that seems to have gotten infected in (L) leg. She went to [facility]. She had imaging done that showed broken ribs and spine compression. Takes tylenol as needed. HPI:  George Watt is a 80year old year old right handed female with a prior history of a recent fall with rib fractures and a T11 compression fracture. She was placed kevin TLSO. She is not complaining of any low back pain currently or rib pain. She has developed pain in the left posterior lower leg and her family has noticed a sore there. She has been scratching the area. Due to her dementia, it is difficult to get a accurate history form her. The family has been dressing her lesion with guaze. Past Medical History   Past Medical History:   Diagnosis Date    Moderate late onset Alzheimer's dementia without behavioral disturbance, psychotic disturbance, mood disturbance, or anxiety ([facility] [address] 09/26/2023       Past Surgical History   Past Surgical History:   Procedure Laterality Date    TONSILLECTOMY      UMBILICAL HERNIA REPAIR  1988       Family History   History reviewed. No pertinent family history. Social History   Social History    Socioeconomic History      Marital status:        Spouse name: Not on file      Number of children: Not on file      Years of education: Not on file      Highest education level: Not on file    Occupational History      Not on file    Tobacco Use      Smoking status: Former      Smokeless tobacco: Never    Vaping Use      Vaping Use: Never used    Substance and Sexual Activity      Alcohol use: Yes      Drug use: No      Sexual activity: Not on file    Other Topics      Concerns:        Grew up on a farm: Not Asked        History of tanning: Not Asked        Outdoor occupation: Not Asked        Pt has a pacemaker: No        Pt has a defibrillator: No        Breast feeding: Not Asked        Reaction to local anesthetic: No    Social History Narrative      [facility] [address] [facility]  Financial Resource Strain: Not on file  Food Insecurity: Not on file  Transportation Needs: Not on file  Physical Activity: Not on file  Stress: Not on file  Social Connections: Not on file  Housing Stability: Not on file    Review of Systems  Patient-reported ROS  Constitutional  Sleep Disturbance: denies  Chills: denies  Fever: denies  Weight Gain: denies  Weight Loss: denies   Cardiovascular  Chest Pain: denies  Irregular Heartbeat: denies   Respiratory  Painful Breathing: denies  Wheezing: denies   Gastrointestinal  Bowel Incontinence: denies  Heartburn: denies  Abdominal Pain: denies  Blood in Stool : denies  Rectal Pain: denies   Hematology  Easy Bruising: admits  Easy Bleeding: admits   Genitourinary  Difficulty Urinating: denies  Bladder Incontinence: denies  Pelvic Pain: denies  Painful Urination: denies   Musculoskeletal  Joint Stiffness: admits  Painful Joints: admits  Tailbone Pain: denies  Swollen Joints: denies   Peripheral Vascular  Swelling of Legs/Feet: admits  Cold Extremities: denies   Skin  Open Sores: admits  Nodules or Lumps: denies  Rash: admits   Neurological  Loss of Strength Since last Visit: denies  Tingling/Numbness: denies  Balance: denies   Psychiatric  Anxiety: denies  Depressed Mood: denies        PE: The patient does appear in her stated age in no distress. The patient is well groomed. Psychiatric:  The patient is alert and oriented x 3. The patient has a normal affect and mood. Respiratory:  No acute respiratory distress. Patient does not have a cough. HEENT:  Extraocular muscles are intact. There is no kern icterus. Pupils are equal, round, and reactive to light. No redness or discharge bilaterally. Skin:  There are no rashes or lesions.     Vitals:   09/18/23  1143 BP: 100/66       Gait:    Gait: Wide Based and short stride length   Sit to Stand: no difficulty     Lumbar Spine:    Scoliosis: No scoliosis present     Legs:  Left posterior lower leg with silver dollar sized abrasion that is non-draining and has a large erythematous halo around it. Arms:  The left arm has Steri-Strips in place with significant blood clots around an abrasion on the forearm. No significant erythema around this lesion. Assessment  1. Cellulitis of lower extremity, unspecified laterality    2. Moderate late onset Alzheimer's dementia without behavioral disturbance, psychotic disturbance, mood disturbance, or anxiety ([facility] [address]    3. Compression fracture of T11 vertebra with routine healing      Plan  I instructed the patient's family and how to do wet-to-dry dressings for the lesion on the left lower leg. We will do these 2-3 times a day. I placed the patient on amoxicillin 400 mg 3 times a day for 10 days. I have given the patient and her family the information for the wound care clinic here at [place] for follow-up. The patient's family will let me know how she is doing in about 1 week. Since the patient is not having the pain from the compression fracture, she no longer needs to wear the TLSO. The patient understands and agrees with the stated plan. Justin Park MD  9/26/2023

## 2023-10-11 ENCOUNTER — APPOINTMENT (OUTPATIENT)
Dept: WOUND CARE | Facility: HOSPITAL | Age: 88
End: 2023-10-11
Attending: NURSE PRACTITIONER
Payer: MEDICARE

## 2023-10-18 ENCOUNTER — TELEPHONE (OUTPATIENT)
Dept: FAMILY MEDICINE | Age: 88
End: 2023-10-18

## 2023-10-18 ENCOUNTER — NURSE TRIAGE (OUTPATIENT)
Dept: TELEHEALTH | Age: 88
End: 2023-10-18

## 2023-10-18 ENCOUNTER — OFFICE VISIT (OUTPATIENT)
Dept: INTERNAL MEDICINE | Age: 88
End: 2023-10-18

## 2023-10-18 ENCOUNTER — APPOINTMENT (OUTPATIENT)
Dept: FAMILY MEDICINE | Age: 88
End: 2023-10-18

## 2023-10-18 VITALS
BODY MASS INDEX: 17.45 KG/M2 | HEART RATE: 91 BPM | SYSTOLIC BLOOD PRESSURE: 126 MMHG | WEIGHT: 86.42 LBS | OXYGEN SATURATION: 96 % | DIASTOLIC BLOOD PRESSURE: 69 MMHG | TEMPERATURE: 97.5 F

## 2023-10-18 DIAGNOSIS — E43 PROTEIN-CALORIE MALNUTRITION, SEVERE (CMD): ICD-10-CM

## 2023-10-18 DIAGNOSIS — I87.8 VENOUS STASIS OF LOWER EXTREMITY: ICD-10-CM

## 2023-10-18 DIAGNOSIS — H61.23 IMPACTED CERUMEN OF BOTH EARS: Primary | ICD-10-CM

## 2023-10-18 PROCEDURE — 99205 OFFICE O/P NEW HI 60 MIN: CPT | Performed by: STUDENT IN AN ORGANIZED HEALTH CARE EDUCATION/TRAINING PROGRAM

## 2023-10-18 RX ORDER — OFLOXACIN 3 MG/ML
5 SOLUTION AURICULAR (OTIC) 2 TIMES DAILY
Qty: 5 ML | Refills: 0 | Status: SHIPPED | OUTPATIENT
Start: 2023-10-18

## 2023-10-18 ASSESSMENT — ENCOUNTER SYMPTOMS
ALLERGIC/IMMUNOLOGIC NEGATIVE: 1
NEUROLOGICAL NEGATIVE: 1
CONSTITUTIONAL NEGATIVE: 1
ENDOCRINE NEGATIVE: 1
GASTROINTESTINAL NEGATIVE: 1
HEMATOLOGIC/LYMPHATIC NEGATIVE: 1
EYES NEGATIVE: 1
PSYCHIATRIC NEGATIVE: 1
WOUND: 1
RESPIRATORY NEGATIVE: 1
COLOR CHANGE: 1

## 2023-10-18 ASSESSMENT — PATIENT HEALTH QUESTIONNAIRE - PHQ9
SUM OF ALL RESPONSES TO PHQ9 QUESTIONS 1 AND 2: 0
2. FEELING DOWN, DEPRESSED OR HOPELESS: NOT AT ALL
SUM OF ALL RESPONSES TO PHQ9 QUESTIONS 1 AND 2: 0
1. LITTLE INTEREST OR PLEASURE IN DOING THINGS: NOT AT ALL
CLINICAL INTERPRETATION OF PHQ2 SCORE: NO FURTHER SCREENING NEEDED

## 2023-11-08 ENCOUNTER — APPOINTMENT (OUTPATIENT)
Dept: FAMILY MEDICINE | Age: 88
End: 2023-11-08

## 2023-12-15 ENCOUNTER — TELEPHONE (OUTPATIENT)
Dept: INTERNAL MEDICINE | Age: 88
End: 2023-12-15

## 2023-12-20 ENCOUNTER — APPOINTMENT (OUTPATIENT)
Dept: GENERAL RADIOLOGY | Facility: HOSPITAL | Age: 88
DRG: 480 | End: 2023-12-20
Attending: EMERGENCY MEDICINE
Payer: MEDICARE

## 2023-12-20 ENCOUNTER — HOSPITAL ENCOUNTER (INPATIENT)
Facility: HOSPITAL | Age: 88
LOS: 7 days | Discharge: HOME HEALTH | DRG: 480 | End: 2023-12-28
Attending: EMERGENCY MEDICINE | Admitting: INTERNAL MEDICINE
Payer: MEDICARE

## 2023-12-20 ENCOUNTER — HOSPITAL ENCOUNTER (INPATIENT)
Facility: HOSPITAL | Age: 88
LOS: 7 days | Discharge: HOME | DRG: 480 | End: 2023-12-28
Attending: EMERGENCY MEDICINE | Admitting: INTERNAL MEDICINE
Payer: MEDICARE

## 2023-12-20 DIAGNOSIS — U07.1 COVID-19 VIRUS INFECTION: ICD-10-CM

## 2023-12-20 DIAGNOSIS — S72.002A CLOSED FRACTURE OF LEFT HIP, INITIAL ENCOUNTER (HCC): Primary | ICD-10-CM

## 2023-12-20 LAB
ANION GAP SERPL CALC-SCNC: 6 MMOL/L (ref 0–18)
BASOPHILS # BLD AUTO: 0.01 X10(3) UL (ref 0–0.2)
BASOPHILS NFR BLD AUTO: 0.1 %
BILIRUB UR QL: NEGATIVE
BUN BLD-MCNC: 17 MG/DL (ref 9–23)
BUN/CREAT SERPL: 25 (ref 10–20)
CALCIUM BLD-MCNC: 9 MG/DL (ref 8.7–10.4)
CHLORIDE SERPL-SCNC: 100 MMOL/L (ref 98–112)
CLARITY UR: CLEAR
CO2 SERPL-SCNC: 28 MMOL/L (ref 21–32)
COLOR UR: YELLOW
CREAT BLD-MCNC: 0.68 MG/DL
DEPRECATED RDW RBC AUTO: 40 FL (ref 35.1–46.3)
EGFRCR SERPLBLD CKD-EPI 2021: 82 ML/MIN/1.73M2 (ref 60–?)
EOSINOPHIL # BLD AUTO: 0 X10(3) UL (ref 0–0.7)
EOSINOPHIL NFR BLD AUTO: 0 %
ERYTHROCYTE [DISTWIDTH] IN BLOOD BY AUTOMATED COUNT: 12.8 % (ref 11–15)
FLUAV + FLUBV RNA SPEC NAA+PROBE: NEGATIVE
FLUAV + FLUBV RNA SPEC NAA+PROBE: NEGATIVE
GLUCOSE BLD-MCNC: 115 MG/DL (ref 70–99)
GLUCOSE UR-MCNC: NORMAL MG/DL
HCT VFR BLD AUTO: 35 %
HGB BLD-MCNC: 11.9 G/DL
HGB UR QL STRIP.AUTO: NEGATIVE
IMM GRANULOCYTES # BLD AUTO: 0.03 X10(3) UL (ref 0–1)
IMM GRANULOCYTES NFR BLD: 0.4 %
KETONES UR-MCNC: NEGATIVE MG/DL
LEUKOCYTE ESTERASE UR QL STRIP.AUTO: NEGATIVE
LYMPHOCYTES # BLD AUTO: 0.35 X10(3) UL (ref 1–4)
LYMPHOCYTES NFR BLD AUTO: 4.2 %
MCH RBC QN AUTO: 29.1 PG (ref 26–34)
MCHC RBC AUTO-ENTMCNC: 34 G/DL (ref 31–37)
MCV RBC AUTO: 85.6 FL
MONOCYTES # BLD AUTO: 0.75 X10(3) UL (ref 0.1–1)
MONOCYTES NFR BLD AUTO: 9 %
MRSA NASAL: NEGATIVE
NEUTROPHILS # BLD AUTO: 7.16 X10 (3) UL (ref 1.5–7.7)
NEUTROPHILS # BLD AUTO: 7.16 X10(3) UL (ref 1.5–7.7)
NEUTROPHILS NFR BLD AUTO: 86.3 %
NITRITE UR QL STRIP.AUTO: NEGATIVE
OSMOLALITY SERPL CALC.SUM OF ELEC: 280 MOSM/KG (ref 275–295)
PH UR: 6 [PH] (ref 5–8)
PLATELET # BLD AUTO: 207 10(3)UL (ref 150–450)
POTASSIUM SERPL-SCNC: 3.6 MMOL/L (ref 3.5–5.1)
PROT UR-MCNC: NEGATIVE MG/DL
RBC # BLD AUTO: 4.09 X10(6)UL
RSV RNA SPEC NAA+PROBE: NEGATIVE
SARS-COV-2 RNA RESP QL NAA+PROBE: DETECTED
SODIUM SERPL-SCNC: 134 MMOL/L (ref 136–145)
SP GR UR STRIP: 1.01 (ref 1–1.03)
STAPH A BY PCR: NEGATIVE
UROBILINOGEN UR STRIP-ACNC: NORMAL
WBC # BLD AUTO: 8.3 X10(3) UL (ref 4–11)

## 2023-12-20 PROCEDURE — 99223 1ST HOSP IP/OBS HIGH 75: CPT | Performed by: HOSPITALIST

## 2023-12-20 PROCEDURE — 73502 X-RAY EXAM HIP UNI 2-3 VIEWS: CPT | Performed by: EMERGENCY MEDICINE

## 2023-12-20 PROCEDURE — 71045 X-RAY EXAM CHEST 1 VIEW: CPT | Performed by: EMERGENCY MEDICINE

## 2023-12-20 RX ORDER — MORPHINE SULFATE 2 MG/ML
2 INJECTION, SOLUTION INTRAMUSCULAR; INTRAVENOUS ONCE
Status: COMPLETED | OUTPATIENT
Start: 2023-12-20 | End: 2023-12-20

## 2023-12-20 RX ORDER — MORPHINE SULFATE 2 MG/ML
2 INJECTION, SOLUTION INTRAMUSCULAR; INTRAVENOUS ONCE
Status: COMPLETED | OUTPATIENT
Start: 2023-12-20 | End: 2023-12-21

## 2023-12-21 ENCOUNTER — ANESTHESIA EVENT (OUTPATIENT)
Dept: SURGERY | Facility: HOSPITAL | Age: 88
End: 2023-12-21
Payer: MEDICARE

## 2023-12-21 ENCOUNTER — ANESTHESIA (OUTPATIENT)
Dept: SURGERY | Facility: HOSPITAL | Age: 88
End: 2023-12-21
Payer: MEDICARE

## 2023-12-21 LAB
ANTIBODY SCREEN: NEGATIVE
ATRIAL RATE: 99 BPM
P AXIS: 39 DEGREES
P-R INTERVAL: 206 MS
Q-T INTERVAL: 360 MS
QRS DURATION: 82 MS
QTC CALCULATION (BEZET): 462 MS
R AXIS: -30 DEGREES
RH BLOOD TYPE: POSITIVE
RH BLOOD TYPE: POSITIVE
T AXIS: 58 DEGREES
VENTRICULAR RATE: 99 BPM

## 2023-12-21 PROCEDURE — 99233 SBSQ HOSP IP/OBS HIGH 50: CPT | Performed by: HOSPITALIST

## 2023-12-21 PROCEDURE — 5A0945Z ASSISTANCE WITH RESPIRATORY VENTILATION, 24-96 CONSECUTIVE HOURS: ICD-10-PCS | Performed by: INTERNAL MEDICINE

## 2023-12-21 RX ORDER — ONDANSETRON 2 MG/ML
4 INJECTION INTRAMUSCULAR; INTRAVENOUS EVERY 6 HOURS PRN
Status: DISCONTINUED | OUTPATIENT
Start: 2023-12-21 | End: 2023-12-22

## 2023-12-21 RX ORDER — MORPHINE SULFATE 2 MG/ML
2 INJECTION, SOLUTION INTRAMUSCULAR; INTRAVENOUS EVERY 2 HOUR PRN
Status: DISCONTINUED | OUTPATIENT
Start: 2023-12-21 | End: 2023-12-22

## 2023-12-21 RX ORDER — HALOPERIDOL 5 MG/ML
2 INJECTION INTRAMUSCULAR EVERY 6 HOURS PRN
Status: DISCONTINUED | OUTPATIENT
Start: 2023-12-21 | End: 2023-12-23

## 2023-12-21 RX ORDER — HYDRALAZINE HYDROCHLORIDE 20 MG/ML
10 INJECTION INTRAMUSCULAR; INTRAVENOUS EVERY 4 HOURS PRN
Status: DISCONTINUED | OUTPATIENT
Start: 2023-12-21 | End: 2023-12-28

## 2023-12-21 RX ORDER — FAMOTIDINE 10 MG/ML
20 INJECTION, SOLUTION INTRAVENOUS DAILY
Status: DISCONTINUED | OUTPATIENT
Start: 2023-12-21 | End: 2023-12-23

## 2023-12-21 RX ORDER — SODIUM CHLORIDE 9 MG/ML
INJECTION, SOLUTION INTRAVENOUS CONTINUOUS
Status: DISCONTINUED | OUTPATIENT
Start: 2023-12-21 | End: 2023-12-25

## 2023-12-21 RX ORDER — MORPHINE SULFATE 4 MG/ML
4 INJECTION, SOLUTION INTRAMUSCULAR; INTRAVENOUS EVERY 2 HOUR PRN
Status: DISCONTINUED | OUTPATIENT
Start: 2023-12-21 | End: 2023-12-22

## 2023-12-21 NOTE — PLAN OF CARE
Patient is Aox1, per daughter she is AOx2 at baseline. No acute distress, complains of pain in L hip with movement. On 1L via nasal cannula, no home O2. NPO. IV pepcid. IV fluids. Plan for ortho procedure today.     Problem: PAIN - ADULT  Goal: Verbalizes/displays adequate comfort level or patient's stated pain goal  Description: INTERVENTIONS:  - Encourage pt to monitor pain and request assistance  - Assess pain using appropriate pain scale  - Administer analgesics based on type and severity of pain and evaluate response  - Implement non-pharmacological measures as appropriate and evaluate response  - Consider cultural and social influences on pain and pain management  - Manage/alleviate anxiety  - Utilize distraction and/or relaxation techniques  - Monitor for opioid side effects  - Notify MD/LIP if interventions unsuccessful or patient reports new pain  - Anticipate increased pain with activity and pre-medicate as appropriate  Outcome: Progressing     Problem: MUSCULOSKELETAL - ADULT  Goal: Return mobility to safest level of function  Description: INTERVENTIONS:  - Assess patient stability and activity tolerance for standing, transferring and ambulating w/ or w/o assistive devices  - Assist with transfers and ambulation using safe patient handling equipment as needed  - Ensure adequate protection for wounds/incisions during mobilization  - Obtain PT/OT consults as needed  - Advance activity as appropriate  - Communicate ordered activity level and limitations with patient/family  Outcome: Progressing     Problem: MUSCULOSKELETAL - ADULT  Goal: Maintain proper alignment of affected body part  Description: INTERVENTIONS:  - Support and protect limb and body alignment per provider's orders  - Instruct and reinforce with patient and family use of appropriate assistive device and precautions (e.g. spinal or hip dislocation precautions)  Outcome: Progressing

## 2023-12-21 NOTE — PROGRESS NOTES
[facility] Dosing Service   Initial Renal Dosing and Drug-Drug Interaction (DDI) Review for Paxlovid (Nirmatrelvir/Ritonavir)    Gracie Henao is a 80year old female who is being initiated on Paxlovid (nirmatrelvir/ritonavir) therapy COVID-19 infection. Pharmacy has been asked to dose and review Paxlovid DDI's by Dr Aldair Godinez.      Labs:   Recent Labs   Lab 12/20/23 2116   EGFRCR 80       Assessment/Plan:   Eligibility criteria for Paxlovid use have been met. Wale Higgins 1) Based on the above assessment:  Recommend initiation of Paxlovid (Nirmatrelvir-ritonavir 300-100 mg) PO BID x 5 days based on GFR  2) No clinically significant DDI's exist. Pharmacy will continue to follow and monitor for DDI's for the duration of Paxlovid therapy. Prior to Admission Medications     Patient does not take any medications at home per daughter and per [employer] history and [employer] history. Current Medications    Current Facility-Administered Medications:     ondansetron (Zofran) 4 MG/2ML injection 4 mg, 4 mg, Intravenous, Q6H PRN    morphINE PF 2 MG/ML injection 2 mg, 2 mg, Intravenous, Q2H PRN **OR** morphINE PF 4 MG/ML injection 4 mg, 4 mg, Intravenous, Q2H PRN    hydrALAzine (Apresoline) 20 mg/mL injection 10 mg, 10 mg, Intravenous, Q4H PRN    sodium chloride 0.9% infusion, , Intravenous, Continuous    famotidine (Pepcid) 20 mg/2mL injection 20 mg, 20 mg, Intravenous, Daily    haloperidol lactate (Haldol) 5 MG/ML injection 2 mg, 2 mg, Intravenous, Q6H PRN    nirmatrelvir-ritonavir (Paxlovid) 300-100 MG therapy pack 3 tablet, 3 tablet, Oral, BID    Discontinued Medications: There are no discontinued medications. We appreciate the opportunity to assist in the care of this patient.      Kathryn Haynes, PharmD  12/21/2023  2:16 AM  Health system Pharmacy Extension: [phone]

## 2023-12-21 NOTE — CM/SW NOTE
12/21/23 1200   CM/SW Referral Data   Referral Source Physician   Reason for Referral Discharge planning   Informant Daughter  (dtr [place] Reasons)   Medical Hx   Does patient have an established PCP? Yes  Ramiro Major)   Patient Info   Patient's Home Environment Condo/Apt with elevator   Patient lives with Alone   Patient Status Prior to Admission   Independent with ADLs and Mobility No   Pt. requires assistance with Driving; Ambulating   Services in place prior to admission DME/Supplies at home   Type of DME/Supplies Straight Cane;Standard Walker   Discharge Needs   Anticipated D/C needs Home health care;Medical equipment;Subacute rehab; To be determined     Received MDO for DC Planning. Per RN rounds, pt was currently AOX1-2. SW contacted pt's dtr [place] Reasons via phone. Above assessment completed. Pt lives alone in a condo/apt w/ elevator access. Pt uses a cane for ambulation and recently bought a walker. Per dtr, pt has not been using walker often because she is not comfortable with it yet. Pt does not drive. Pt is currently on O2 but does not have any home oxygen. SW to monitor for needs closer to DC. Pt has no hx w/ HH or TRICIA. Pt has hx w/ outpatient PT. SW explained likely that [facility] or [facility] will be recommended for pt pending how she does post op (ORIF tonight @ ~ 5PM). Pt's dtr expressed understanding. SW/CM to f/up on needs pending PT/OT evals post op. PLAN: TBD - pending PT/OT & clinical course post op        SW/CM to remain available for support and/or discharge planning.          Vale Montalvo, MSW, [facility]

## 2023-12-21 NOTE — PROGRESS NOTES
PT is scheduled for IM nailing ORIF L hip in PM today. PT will wait for new weight bearing and activity orders s/p procedure to begin. PT spoke with RN and Discharge planner regarding pending sx and rehab to follow.

## 2023-12-21 NOTE — ED QUICK NOTES
Orders for admission, patient is aware of plan and ready to go upstairs. Any questions, please call ED RN Lorraine Tavarez at extension [phone].      Patient Covid vaccination status: Unvaccinated     COVID Test Ordered in ED: None    COVID Suspicion at Admission: N/A    Running Infusions:  None    Mental Status/LOC at time of transport: a & o x 2    Other pertinent information:   CIWA score: N/A   NIH score:  N/A

## 2023-12-21 NOTE — CONSULTS
Spiritual Care Visit Note    Visited With: Patient not available;Family    Writer report consulting with RN. Writer delivered [employer] to patient per request. Darlene Arias attempted to help patient complete HCPoA. Lorraine Slater, daughter mentioned one has been completed and she will bring a copy for hospital records. Patient not able to receive communion because of health status. No other need at this time. Follow up as requested. Spiritual Care Taxonomy:    Intended Effects: Aligning care plan with patient's values    Methods: Collaborate with care team member;Offer support    Interventions: Active listening; Ask guided questions;Silent prayer     [address], [address]   [id number]     On call  services [phone]

## 2023-12-21 NOTE — ED INITIAL ASSESSMENT (HPI)
To ED via [facility] for a fall that occurred at 1000 today. Per EMS, care taker found the patient on the floor at 1000 today and helped her up to the chair. Patient is c/o pain to the left hip. Recent fall was in August and patient had broken ribs and compression fractures in her spine.     AO2 at baseline

## 2023-12-21 NOTE — PLAN OF CARE
Patient is A&Ox1, 1Lo2, complete Q2 turns. Pt scheduled foor ortho surgery at 8pm tonight. Pam inserted d/t retention. Duaghter at the bedside. Call light within reach and fall precautions in place. Problem: Patient Centered Care  Goal: Patient preferences are identified and integrated in the patient's plan of care  Description: Interventions:  - What would you like us to know as we care for you? From home alone with caregiver for 4HRS  - Provide timely, complete, and accurate information to patient/family  - Incorporate patient and family knowledge, values, beliefs, and cultural backgrounds into the planning and delivery of care  - Encourage patient/family to participate in care and decision-making at the level they choose  - Honor patient and family perspectives and choices  Outcome: Progressing     Problem: Patient/Family Goals  Goal: Patient/Family Long Term Goal  Description: Patient's Long Term Goal: To go back home    Interventions:  - follow medical regimen  - See additional Care Plan goals for specific interventions  Outcome: Progressing  Goal: Patient/Family Short Term Goal  Description: Patient's Short Term Goal: To not fall    Interventions:   - Follow medical regimen  - See additional Care Plan goals for specific interventions  Outcome: Progressing     Problem: SAFETY ADULT - FALL  Goal: Free from fall injury  Description: INTERVENTIONS:  - Assess pt frequently for physical needs  - Identify cognitive and physical deficits and behaviors that affect risk of falls.   - [place] fall precautions as indicated by assessment.  - Educate pt/family on patient safety including physical limitations  - Instruct pt to call for assistance with activity based on assessment  - Modify environment to reduce risk of injury  - Provide assistive devices as appropriate  - Consider OT/PT consult to assist with strengthening/mobility  - Encourage toileting schedule  Outcome: Progressing     Problem: PAIN - ADULT  Goal: Verbalizes/displays adequate comfort level or patient's stated pain goal  Description: INTERVENTIONS:  - Encourage pt to monitor pain and request assistance  - Assess pain using appropriate pain scale  - Administer analgesics based on type and severity of pain and evaluate response  - Implement non-pharmacological measures as appropriate and evaluate response  - Consider cultural and social influences on pain and pain management  - Manage/alleviate anxiety  - Utilize distraction and/or relaxation techniques  - Monitor for opioid side effects  - Notify MD/LIP if interventions unsuccessful or patient reports new pain  - Anticipate increased pain with activity and pre-medicate as appropriate  Outcome: Progressing     Problem: MUSCULOSKELETAL - ADULT  Goal: Return mobility to safest level of function  Description: INTERVENTIONS:  - Assess patient stability and activity tolerance for standing, transferring and ambulating w/ or w/o assistive devices  - Assist with transfers and ambulation using safe patient handling equipment as needed  - Ensure adequate protection for wounds/incisions during mobilization  - Obtain PT/OT consults as needed  - Advance activity as appropriate  - Communicate ordered activity level and limitations with patient/family  Outcome: Progressing  Goal: Maintain proper alignment of affected body part  Description: INTERVENTIONS:  - Support and protect limb and body alignment per provider's orders  - Instruct and reinforce with patient and family use of appropriate assistive device and precautions (e.g. spinal or hip dislocation precautions)  Outcome: Progressing

## 2023-12-21 NOTE — PROGRESS NOTES
WOUND CARE NOTE    History:  Past Medical History:   Diagnosis Date    Moderate late onset Alzheimer's dementia without behavioral disturbance, psychotic disturbance, mood disturbance, or anxiety ([facility] [address] 09/26/2023     Past Surgical History:   Procedure Laterality Date    TONSILLECTOMY      UMBILICAL HERNIA REPAIR  1988            PLAN   Recommendations:  Ortho is currently on consult  PT,OT and Speech are on consult  Dietary consult for recommendations for nutrition to optimize wound healing  Turn schedules  Specialty bed: Air pump to Isotour Gel mattress  Heels elevated using pillows heel boots to offload heels  Use of lift equipment  To prevent sliding: decrease head of bed and elevate foot of bed as medical condition tolerates  Prompt incontinence care  Moisture barrier for incontinence. May consider Remedy zinc to Sacrum   Glucose control to help promote wound healing    Wound(s)  Location: Sacrum   Cleansing  Pamper wipes  Topical Remedy Zinc skin barrier  Dressings Sacral foam  Frequency Q 12 hrs and prn   Location: Left lateral leg and right lateral leg  Cleansing  Saline   Dressings Xeroform gauze, 2x2 gauze  Secure with Border foams  Frequency Daily and prn       Discharge Recommendations: Pt. lives alone and has a caregiver 4 hours a day per nurse. Per chart the pt. has had previous falls and a hospitalization at [address] [place] [address], Sacrum       ASSESSMENT   Jasper Score:  Jasper Scale Score: 14    Chart Reviewed: yes    Wound(s):  The pt. is awake in bed, Covid Isolation maintained. The pt. is s/p fall at home and left hip fracture. Ortho to see. The nurse is at the bedside assisting with gentle turning for sacral assessment. See the wound assessments. Wounds cleansed and dressed. She was repositioned  in bed, bilateral heels and elbows are intact, preventative foam reapplied. Call light in reach. Spoke with the nurse.           Allergies: Bactrim ds, Ciprofloxacin, Penicillins, and Sulfamethoxazole w/trimethoprim    Labs:   Lab Results   Component Value Date    WBC 8.3 12/20/2023    HGB 11.9 (L) 12/20/2023    HCT 35.0 12/20/2023    .0 12/20/2023    CREATSERUM 0.68 12/20/2023    BUN 17 12/20/2023     (L) 12/20/2023    K 3.6 12/20/2023     12/20/2023    CO2 28.0 12/20/2023     (H) 12/20/2023    CA 9.0 12/20/2023     No results found for: \"PREALBUMIN\"      Time Spent 30 Minutes. Sara Obando RN  [facility] AND [address]  [phone]     12/21/23 0073   Wound 12/21/23 Leg Left;Lateral   Date First Assessed/Time First Assessed: 12/21/23 0128   Present on Original Admission: Yes  Primary Wound Type: (c) Skin Tear  Location: Leg  Wound Location Orientation: Left;Lateral   Wound Image    Site Assessment Moist;Painful;Pink;Purple;Red;Fragile   Drainage Amount Scant   Drainage Description Sanguineous   Treatments Cleansed; Saline   Dressing Aquacel Foam;2x2s; Xeroform   Dressing Changed Changed   Dressing Status Clean;Dry; Intact;Dressing Changed   Wound Depth (cm) 0.1 cm   Non-staged Wound Description Partial thickness   Edna-wound Assessment Fragile;Pink;Ecchymosis   Wound Odor None   Shape irregular skin avulsion   Wound 12/21/23 Leg Right;Lateral   Date First Assessed/Time First Assessed: 12/21/23 0128   Present on Original Admission: Yes  Primary Wound Type: (c) Other (comment)  Location: Leg  Wound Location Orientation: Right;Lateral   Wound Image    Site Assessment Dry; Intact;Fragile;Black   Drainage Amount None   Treatments Cleansed; Saline   Dressing 2x2s; Aquacel Foam;Xeroform   Dressing Changed New   Dressing Status Clean;Dry; Intact   Wound Depth (cm) 0.1 cm   Non-staged Wound Description Partial thickness   Edna-wound Assessment Clean;Dry; Intact;Fragile;Pink;Hemosiderin staining   State of Healing Non-healing   Wound Odor None   Wound 12/21/23 Sacrum Mid   Date First Assessed/Time First Assessed: 12/21/23 0128   Present on Original Admission: Yes  Primary Wound Type: Pressure Injury  Location: Sacrum  Wound Location Orientation: Mid  Wound Description (Comments): dried out DTI   Wound Image    Site Assessment Dry;Fragile;Painful;Black; Purple   Drainage Amount None   Treatments Cleansed;Site Care;Topical (Barrier/Moisturizer/Ointment)   Dressing Aquacel Foam   Dressing Changed Changed   Dressing Status Clean;Dry; Intact;Dressing Changed   Edna-wound Assessment Dry; Intact;Fragile;Pink   State of Healing Non-healing   Wound Odor None   Pressure Injury Stage DTPI  (dried out)   Wound Follow Up   Follow up needed Yes   Comfort and Environment Interventions   Specialty Bed/Mattress Other (Comment)  (asked RN to apply air pump to the Isotour gel mattress)

## 2023-12-21 NOTE — PROGRESS NOTES
12/21/23 1000   VISIT TYPE   SLP Inpatient Visit Type (Documentation Required) Attempted Evaluation   FOLLOW UP/PLAN   Follow Up Needed (Documentation Required) Yes   SLP Follow-up Date 12/22/23     BSE orders received/acknowledged. Unable to complete at this time as Pt is NPO for surgery this plópez Collaborated with RN regarding Pt's swallowing plan of care.      Tamika Garzon M.S. CCC/SLP  Speech-Language Pathologist  [facility]  #[id number]

## 2023-12-21 NOTE — PROGRESS NOTES
OT orders generated through Functional Mobility Screen. Pt scheduled for femur orif later today. OT eval deferred.  Pt will need new OT orders post op

## 2023-12-22 ENCOUNTER — APPOINTMENT (OUTPATIENT)
Dept: GENERAL RADIOLOGY | Facility: HOSPITAL | Age: 88
DRG: 480 | End: 2023-12-22
Attending: ORTHOPAEDIC SURGERY
Payer: MEDICARE

## 2023-12-22 LAB
ALBUMIN SERPL-MCNC: 3.3 G/DL (ref 3.2–4.8)
ANION GAP SERPL CALC-SCNC: 6 MMOL/L (ref 0–18)
BUN BLD-MCNC: 14 MG/DL (ref 9–23)
BUN/CREAT SERPL: 24.1 (ref 10–20)
CALCIUM BLD-MCNC: 8.5 MG/DL (ref 8.7–10.4)
CHLORIDE SERPL-SCNC: 103 MMOL/L (ref 98–112)
CO2 SERPL-SCNC: 28 MMOL/L (ref 21–32)
CREAT BLD-MCNC: 0.58 MG/DL
DEPRECATED RDW RBC AUTO: 39.9 FL (ref 35.1–46.3)
EGFRCR SERPLBLD CKD-EPI 2021: 85 ML/MIN/1.73M2 (ref 60–?)
ERYTHROCYTE [DISTWIDTH] IN BLOOD BY AUTOMATED COUNT: 13.1 % (ref 11–15)
GLUCOSE BLD-MCNC: 106 MG/DL (ref 70–99)
HCT VFR BLD AUTO: 33 %
HGB BLD-MCNC: 11.2 G/DL
MAGNESIUM SERPL-MCNC: 1.8 MG/DL (ref 1.6–2.6)
MCH RBC QN AUTO: 28.9 PG (ref 26–34)
MCHC RBC AUTO-ENTMCNC: 33.9 G/DL (ref 31–37)
MCV RBC AUTO: 85.1 FL
OSMOLALITY SERPL CALC.SUM OF ELEC: 285 MOSM/KG (ref 275–295)
PHOSPHATE SERPL-MCNC: 2.8 MG/DL (ref 2.4–5.1)
PLATELET # BLD AUTO: 172 10(3)UL (ref 150–450)
POTASSIUM SERPL-SCNC: 3.6 MMOL/L (ref 3.5–5.1)
RBC # BLD AUTO: 3.88 X10(6)UL
SODIUM SERPL-SCNC: 137 MMOL/L (ref 136–145)
WBC # BLD AUTO: 7.6 X10(3) UL (ref 4–11)

## 2023-12-22 PROCEDURE — 0QS706Z REPOSITION LEFT UPPER FEMUR WITH INTRAMEDULLARY INTERNAL FIXATION DEVICE, OPEN APPROACH: ICD-10-PCS | Performed by: ORTHOPAEDIC SURGERY

## 2023-12-22 PROCEDURE — 73551 X-RAY EXAM OF FEMUR 1: CPT | Performed by: ORTHOPAEDIC SURGERY

## 2023-12-22 PROCEDURE — 76000 FLUOROSCOPY <1 HR PHYS/QHP: CPT | Performed by: ORTHOPAEDIC SURGERY

## 2023-12-22 PROCEDURE — 99233 SBSQ HOSP IP/OBS HIGH 50: CPT | Performed by: HOSPITALIST

## 2023-12-22 DEVICE — TROCHANTERIC NAIL KIT, TI
Type: IMPLANTABLE DEVICE | Site: HIP | Status: FUNCTIONAL
Brand: GAMMA

## 2023-12-22 DEVICE — LAG SCREW, TI
Type: IMPLANTABLE DEVICE | Site: HIP | Status: FUNCTIONAL
Brand: GAMMA

## 2023-12-22 DEVICE — LOCKING SCREW, FULLY THREADED: Type: IMPLANTABLE DEVICE | Site: HIP | Status: FUNCTIONAL

## 2023-12-22 RX ORDER — ONDANSETRON 2 MG/ML
INJECTION INTRAMUSCULAR; INTRAVENOUS AS NEEDED
Status: DISCONTINUED | OUTPATIENT
Start: 2023-12-22 | End: 2023-12-22 | Stop reason: SURG

## 2023-12-22 RX ORDER — OXYCODONE HYDROCHLORIDE 5 MG/1
2.5 TABLET ORAL EVERY 4 HOURS PRN
Status: DISCONTINUED | OUTPATIENT
Start: 2023-12-22 | End: 2023-12-28

## 2023-12-22 RX ORDER — HYDROMORPHONE HYDROCHLORIDE 1 MG/ML
0.4 INJECTION, SOLUTION INTRAMUSCULAR; INTRAVENOUS; SUBCUTANEOUS EVERY 5 MIN PRN
Status: DISCONTINUED | OUTPATIENT
Start: 2023-12-22 | End: 2023-12-22 | Stop reason: HOSPADM

## 2023-12-22 RX ORDER — NALOXONE HYDROCHLORIDE 0.4 MG/ML
0.08 INJECTION, SOLUTION INTRAMUSCULAR; INTRAVENOUS; SUBCUTANEOUS AS NEEDED
Status: DISCONTINUED | OUTPATIENT
Start: 2023-12-22 | End: 2023-12-22 | Stop reason: HOSPADM

## 2023-12-22 RX ORDER — ENEMA 19; 7 G/133ML; G/133ML
1 ENEMA RECTAL ONCE AS NEEDED
Status: DISCONTINUED | OUTPATIENT
Start: 2023-12-22 | End: 2023-12-28

## 2023-12-22 RX ORDER — ENOXAPARIN SODIUM 100 MG/ML
30 INJECTION SUBCUTANEOUS DAILY
Status: DISCONTINUED | OUTPATIENT
Start: 2023-12-23 | End: 2023-12-28

## 2023-12-22 RX ORDER — TRANEXAMIC ACID 10 MG/ML
INJECTION, SOLUTION INTRAVENOUS AS NEEDED
Status: DISCONTINUED | OUTPATIENT
Start: 2023-12-22 | End: 2023-12-22 | Stop reason: SURG

## 2023-12-22 RX ORDER — OXYCODONE HYDROCHLORIDE 5 MG/1
5 TABLET ORAL EVERY 4 HOURS PRN
Status: DISCONTINUED | OUTPATIENT
Start: 2023-12-22 | End: 2023-12-28

## 2023-12-22 RX ORDER — ONDANSETRON 2 MG/ML
4 INJECTION INTRAMUSCULAR; INTRAVENOUS EVERY 6 HOURS PRN
Status: DISCONTINUED | OUTPATIENT
Start: 2023-12-22 | End: 2023-12-28

## 2023-12-22 RX ORDER — MORPHINE SULFATE 4 MG/ML
2 INJECTION, SOLUTION INTRAMUSCULAR; INTRAVENOUS EVERY 10 MIN PRN
Status: DISCONTINUED | OUTPATIENT
Start: 2023-12-22 | End: 2023-12-22 | Stop reason: HOSPADM

## 2023-12-22 RX ORDER — SODIUM CHLORIDE, SODIUM LACTATE, POTASSIUM CHLORIDE, CALCIUM CHLORIDE 600; 310; 30; 20 MG/100ML; MG/100ML; MG/100ML; MG/100ML
INJECTION, SOLUTION INTRAVENOUS CONTINUOUS
Status: DISCONTINUED | OUTPATIENT
Start: 2023-12-22 | End: 2023-12-22 | Stop reason: HOSPADM

## 2023-12-22 RX ORDER — DOCUSATE SODIUM 100 MG/1
100 CAPSULE, LIQUID FILLED ORAL 2 TIMES DAILY
Status: DISCONTINUED | OUTPATIENT
Start: 2023-12-22 | End: 2023-12-23

## 2023-12-22 RX ORDER — DEXAMETHASONE SODIUM PHOSPHATE 4 MG/ML
VIAL (ML) INJECTION AS NEEDED
Status: DISCONTINUED | OUTPATIENT
Start: 2023-12-22 | End: 2023-12-22 | Stop reason: SURG

## 2023-12-22 RX ORDER — LIDOCAINE HYDROCHLORIDE 10 MG/ML
INJECTION, SOLUTION EPIDURAL; INFILTRATION; INTRACAUDAL; PERINEURAL AS NEEDED
Status: DISCONTINUED | OUTPATIENT
Start: 2023-12-22 | End: 2023-12-22 | Stop reason: SURG

## 2023-12-22 RX ORDER — MORPHINE SULFATE 4 MG/ML
4 INJECTION, SOLUTION INTRAMUSCULAR; INTRAVENOUS EVERY 10 MIN PRN
Status: DISCONTINUED | OUTPATIENT
Start: 2023-12-22 | End: 2023-12-22 | Stop reason: HOSPADM

## 2023-12-22 RX ORDER — HYDROMORPHONE HYDROCHLORIDE 1 MG/ML
0.2 INJECTION, SOLUTION INTRAMUSCULAR; INTRAVENOUS; SUBCUTANEOUS EVERY 5 MIN PRN
Status: DISCONTINUED | OUTPATIENT
Start: 2023-12-22 | End: 2023-12-22 | Stop reason: HOSPADM

## 2023-12-22 RX ORDER — SODIUM CHLORIDE, SODIUM LACTATE, POTASSIUM CHLORIDE, CALCIUM CHLORIDE 600; 310; 30; 20 MG/100ML; MG/100ML; MG/100ML; MG/100ML
INJECTION, SOLUTION INTRAVENOUS CONTINUOUS PRN
Status: DISCONTINUED | OUTPATIENT
Start: 2023-12-22 | End: 2023-12-22 | Stop reason: SURG

## 2023-12-22 RX ORDER — BISACODYL 10 MG
10 SUPPOSITORY, RECTAL RECTAL
Status: DISCONTINUED | OUTPATIENT
Start: 2023-12-22 | End: 2023-12-28

## 2023-12-22 RX ORDER — POLYETHYLENE GLYCOL 3350 17 G/17G
17 POWDER, FOR SOLUTION ORAL DAILY PRN
Status: DISCONTINUED | OUTPATIENT
Start: 2023-12-22 | End: 2023-12-23

## 2023-12-22 RX ORDER — SENNOSIDES 8.6 MG
17.2 TABLET ORAL NIGHTLY
Status: DISCONTINUED | OUTPATIENT
Start: 2023-12-22 | End: 2023-12-28

## 2023-12-22 RX ORDER — HYDROMORPHONE HYDROCHLORIDE 1 MG/ML
0.4 INJECTION, SOLUTION INTRAMUSCULAR; INTRAVENOUS; SUBCUTANEOUS EVERY 2 HOUR PRN
Status: DISCONTINUED | OUTPATIENT
Start: 2023-12-22 | End: 2023-12-28

## 2023-12-22 RX ORDER — ALBUTEROL SULFATE 90 UG/1
2 AEROSOL, METERED RESPIRATORY (INHALATION) EVERY 4 HOURS PRN
Status: DISCONTINUED | OUTPATIENT
Start: 2023-12-22 | End: 2023-12-28

## 2023-12-22 RX ORDER — MAGNESIUM SULFATE HEPTAHYDRATE 40 MG/ML
2 INJECTION, SOLUTION INTRAVENOUS ONCE
Status: COMPLETED | OUTPATIENT
Start: 2023-12-22 | End: 2023-12-22

## 2023-12-22 RX ORDER — DOXEPIN HYDROCHLORIDE 50 MG/1
1 CAPSULE ORAL DAILY
Status: DISCONTINUED | OUTPATIENT
Start: 2023-12-23 | End: 2023-12-28

## 2023-12-22 RX ORDER — ROCURONIUM BROMIDE 10 MG/ML
INJECTION, SOLUTION INTRAVENOUS AS NEEDED
Status: DISCONTINUED | OUTPATIENT
Start: 2023-12-22 | End: 2023-12-22 | Stop reason: SURG

## 2023-12-22 RX ORDER — METOCLOPRAMIDE HYDROCHLORIDE 5 MG/ML
5 INJECTION INTRAMUSCULAR; INTRAVENOUS EVERY 8 HOURS PRN
Status: DISCONTINUED | OUTPATIENT
Start: 2023-12-22 | End: 2023-12-23

## 2023-12-22 RX ORDER — PHENYLEPHRINE HCL 10 MG/ML
VIAL (ML) INJECTION AS NEEDED
Status: DISCONTINUED | OUTPATIENT
Start: 2023-12-22 | End: 2023-12-22 | Stop reason: SURG

## 2023-12-22 RX ORDER — CEFAZOLIN SODIUM 1 G/3ML
INJECTION, POWDER, FOR SOLUTION INTRAMUSCULAR; INTRAVENOUS AS NEEDED
Status: DISCONTINUED | OUTPATIENT
Start: 2023-12-22 | End: 2023-12-22 | Stop reason: SURG

## 2023-12-22 RX ORDER — HYDROMORPHONE HYDROCHLORIDE 1 MG/ML
0.6 INJECTION, SOLUTION INTRAMUSCULAR; INTRAVENOUS; SUBCUTANEOUS EVERY 5 MIN PRN
Status: DISCONTINUED | OUTPATIENT
Start: 2023-12-22 | End: 2023-12-22 | Stop reason: HOSPADM

## 2023-12-22 RX ORDER — MORPHINE SULFATE 10 MG/ML
6 INJECTION, SOLUTION INTRAMUSCULAR; INTRAVENOUS EVERY 10 MIN PRN
Status: DISCONTINUED | OUTPATIENT
Start: 2023-12-22 | End: 2023-12-22 | Stop reason: HOSPADM

## 2023-12-22 RX ORDER — HYDROMORPHONE HYDROCHLORIDE 1 MG/ML
0.2 INJECTION, SOLUTION INTRAMUSCULAR; INTRAVENOUS; SUBCUTANEOUS EVERY 2 HOUR PRN
Status: DISCONTINUED | OUTPATIENT
Start: 2023-12-22 | End: 2023-12-28

## 2023-12-22 RX ORDER — CEFAZOLIN SODIUM/WATER 2 G/20 ML
2 SYRINGE (ML) INTRAVENOUS EVERY 8 HOURS
Qty: 40 ML | Refills: 0 | Status: COMPLETED | OUTPATIENT
Start: 2023-12-23 | End: 2023-12-23

## 2023-12-22 RX ADMIN — LIDOCAINE HYDROCHLORIDE 50 MG: 10 INJECTION, SOLUTION EPIDURAL; INFILTRATION; INTRACAUDAL; PERINEURAL at 16:33:00

## 2023-12-22 RX ADMIN — SODIUM CHLORIDE, SODIUM LACTATE, POTASSIUM CHLORIDE, CALCIUM CHLORIDE: 600; 310; 30; 20 INJECTION, SOLUTION INTRAVENOUS at 16:25:00

## 2023-12-22 RX ADMIN — CEFAZOLIN SODIUM 1 G: 1 INJECTION, POWDER, FOR SOLUTION INTRAMUSCULAR; INTRAVENOUS at 16:42:00

## 2023-12-22 RX ADMIN — DEXAMETHASONE SODIUM PHOSPHATE 4 MG: 4 MG/ML VIAL (ML) INJECTION at 16:37:00

## 2023-12-22 RX ADMIN — TRANEXAMIC ACID 1000 MG: 10 INJECTION, SOLUTION INTRAVENOUS at 17:11:00

## 2023-12-22 RX ADMIN — ONDANSETRON 4 MG: 2 INJECTION INTRAMUSCULAR; INTRAVENOUS at 16:37:00

## 2023-12-22 RX ADMIN — ROCURONIUM BROMIDE 30 MG: 10 INJECTION, SOLUTION INTRAVENOUS at 16:35:00

## 2023-12-22 RX ADMIN — PHENYLEPHRINE HCL 100 MCG: 10 MG/ML VIAL (ML) INJECTION at 16:54:00

## 2023-12-22 NOTE — PROGRESS NOTES
12/22/23 0900   VISIT TYPE   SLP Inpatient Visit Type (Documentation Required) Attempted Evaluation   FOLLOW UP/PLAN   Follow Up Needed (Documentation Required) Yes   SLP Follow-up Date 12/23/23     Attempted again to complete BSE; however, Pt remains NPO with surgery now scheduled for today as unable to complete previous day. BSE 12/23/23. Collaborated with RN regarding Pt's swallowing plan of care.        Chay Montes De Oca M.S. CCC/SLP  Speech-Language Pathologist  [facility]  #[id number]

## 2023-12-22 NOTE — BRIEF OP NOTE
Pre-Operative Diagnosis: Intertrochanteric fracture of left hip ([facility]) [S72.142A]     Post-Operative Diagnosis: Intertrochanteric fracture of left hip ([address] [S72.142A]      Procedure Performed:   LEFT HIP INTRAMEDULLARY NAIL    Surgeon(s) and Role:     * Uri Ragland MD - Primary    Assistant(s):  Surgical Assistant.: Maki Clinton     Surgical Findings: see op note      Specimen: none     Estimated Blood Loss: No data recorded    Plan  WBAT  Enox for DVT ppx  Pain control  Dressing changes prn  Follow up in clinic 3 weeks      Michael Phoenix MD  12/22/2023  5:36 PM

## 2023-12-22 NOTE — PLAN OF CARE
Patient displays pain with movement. But appears comfortable at rest. Left hip scheduled for surgery at 1510. Patient and daughter updated on POC. Patient unable to comprehend teaching. Daughter verbalized understanding of POC. Problem: Patient Centered Care  Goal: Patient preferences are identified and integrated in the patient's plan of care  Description: Interventions:  - What would you like us to know as we care for you? From home alone with caregiver for 4HRS  - Provide timely, complete, and accurate information to patient/family  - Incorporate patient and family knowledge, values, beliefs, and cultural backgrounds into the planning and delivery of care  - Encourage patient/family to participate in care and decision-making at the level they choose  - Honor patient and family perspectives and choices  Outcome: Progressing     Problem: Patient/Family Goals  Goal: Patient/Family Long Term Goal  Description: Patient's Long Term Goal: To go back home    Interventions:  - Surgery  - PT/OT  - Medication compliance  - Follow provider recommendations  - See additional Care Plan goals for specific interventions  Outcome: Progressing  Goal: Patient/Family Short Term Goal  Description: Patient's Short Term Goal: To not fall    Interventions:   - Assistance with mobility  - PT/OT  - Medication compliance  - Follow provider recommendations  - See additional Care Plan goals for specific interventions  Outcome: Progressing     Problem: SAFETY ADULT - FALL  Goal: Free from fall injury  Description: INTERVENTIONS:  - Assess pt frequently for physical needs  - Identify cognitive and physical deficits and behaviors that affect risk of falls.   - [place] fall precautions as indicated by assessment.  - Educate pt/family on patient safety including physical limitations  - Instruct pt to call for assistance with activity based on assessment  - Modify environment to reduce risk of injury  - Provide assistive devices as appropriate  - Consider OT/PT consult to assist with strengthening/mobility  - Encourage toileting schedule  Outcome: Progressing     Problem: PAIN - ADULT  Goal: Verbalizes/displays adequate comfort level or patient's stated pain goal  Description: INTERVENTIONS:  - Encourage pt to monitor pain and request assistance  - Assess pain using appropriate pain scale  - Administer analgesics based on type and severity of pain and evaluate response  - Implement non-pharmacological measures as appropriate and evaluate response  - Consider cultural and social influences on pain and pain management  - Manage/alleviate anxiety  - Utilize distraction and/or relaxation techniques  - Monitor for opioid side effects  - Notify MD/LIP if interventions unsuccessful or patient reports new pain  - Anticipate increased pain with activity and pre-medicate as appropriate  Outcome: Progressing     Problem: MUSCULOSKELETAL - ADULT  Goal: Return mobility to safest level of function  Description: INTERVENTIONS:  - Assess patient stability and activity tolerance for standing, transferring and ambulating w/ or w/o assistive devices  - Assist with transfers and ambulation using safe patient handling equipment as needed  - Ensure adequate protection for wounds/incisions during mobilization  - Obtain PT/OT consults as needed  - Advance activity as appropriate  - Communicate ordered activity level and limitations with patient/family  Outcome: Progressing  Goal: Maintain proper alignment of affected body part  Description: INTERVENTIONS:  - Support and protect limb and body alignment per provider's orders  - Instruct and reinforce with patient and family use of appropriate assistive device and precautions (e.g. spinal or hip dislocation precautions)  Outcome: Progressing

## 2023-12-22 NOTE — PROGRESS NOTES
Pt on bed rest until surgery, Left hip sx scheduled for surgery at 1510.  PT will need new orders for weight bearing and activity orders to begin PT eval.

## 2023-12-22 NOTE — ANESTHESIA PROCEDURE NOTES
Airway  Date/Time: 12/22/2023 4:37 PM  Urgency: Elective    Airway not difficult    General Information and Staff    Patient location during procedure: OR  Anesthesiologist: Hanane Bello MD  Resident/CRNA: Susan Gudino CRNA  Performed: CRNA   Performed by: Susan Gudino CRNA  Authorized by: Hanane Bello MD      Indications and Patient Condition  Indications for airway management: anesthesia  Sedation level: deep  Preoxygenated: yes  Patient position: sniffing  Mask difficulty assessment: 2 - vent by mask + OA or adjuvant +/- NMBA    Final Airway Details  Final airway type: endotracheal airway      Successful airway: ETT  Cuffed: yes   Successful intubation technique: Video laryngoscopy  Facilitating devices/methods: intubating stylet and cricoid pressure  Blade type: Bah MAC 3.  Blade size: #3  ETT size (mm): 7.0    Cormack-Lehane Classification: grade I - full view of glottis  Placement verified by: capnometry   Measured from: teeth  ETT to teeth (cm): 19  Number of attempts at approach: 1  Ventilation between attempts: BVM    Additional Comments  Attempted DL with MAC 3 x 1, grade III view.  --> switched to [address]

## 2023-12-22 NOTE — PLAN OF CARE
Patient is alert & oriented to self, hx of dementia. On 1L of oxygen. Vitals stable. IVF infusing. Orozco in place. NPO since midnight. Bedrest. Plan for hip/femur intramedullary nail in afternoon. Will continue to monitor. Problem: Patient Centered Care  Goal: Patient preferences are identified and integrated in the patient's plan of care  Description: Interventions:  - What would you like us to know as we care for you? From home alone with caregiver for 4HRS  - Provide timely, complete, and accurate information to patient/family  - Incorporate patient and family knowledge, values, beliefs, and cultural backgrounds into the planning and delivery of care  - Encourage patient/family to participate in care and decision-making at the level they choose  - Honor patient and family perspectives and choices  Outcome: Progressing     Problem: Patient/Family Goals  Goal: Patient/Family Long Term Goal  Description: Patient's Long Term Goal: To go back home    Interventions:  - follow medical regimen  - See additional Care Plan goals for specific interventions  Outcome: Progressing  Goal: Patient/Family Short Term Goal  Description: Patient's Short Term Goal: To not fall    Interventions:   - Follow medical regimen  - See additional Care Plan goals for specific interventions  Outcome: Progressing     Problem: SAFETY ADULT - FALL  Goal: Free from fall injury  Description: INTERVENTIONS:  - Assess pt frequently for physical needs  - Identify cognitive and physical deficits and behaviors that affect risk of falls.   - [place] fall precautions as indicated by assessment.  - Educate pt/family on patient safety including physical limitations  - Instruct pt to call for assistance with activity based on assessment  - Modify environment to reduce risk of injury  - Provide assistive devices as appropriate  - Consider OT/PT consult to assist with strengthening/mobility  - Encourage toileting schedule  Outcome: Progressing Problem: PAIN - ADULT  Goal: Verbalizes/displays adequate comfort level or patient's stated pain goal  Description: INTERVENTIONS:  - Encourage pt to monitor pain and request assistance  - Assess pain using appropriate pain scale  - Administer analgesics based on type and severity of pain and evaluate response  - Implement non-pharmacological measures as appropriate and evaluate response  - Consider cultural and social influences on pain and pain management  - Manage/alleviate anxiety  - Utilize distraction and/or relaxation techniques  - Monitor for opioid side effects  - Notify MD/LIP if interventions unsuccessful or patient reports new pain  - Anticipate increased pain with activity and pre-medicate as appropriate  Outcome: Progressing     Problem: MUSCULOSKELETAL - ADULT  Goal: Return mobility to safest level of function  Description: INTERVENTIONS:  - Assess patient stability and activity tolerance for standing, transferring and ambulating w/ or w/o assistive devices  - Assist with transfers and ambulation using safe patient handling equipment as needed  - Ensure adequate protection for wounds/incisions during mobilization  - Obtain PT/OT consults as needed  - Advance activity as appropriate  - Communicate ordered activity level and limitations with patient/family  Outcome: Progressing  Goal: Maintain proper alignment of affected body part  Description: INTERVENTIONS:  - Support and protect limb and body alignment per provider's orders  - Instruct and reinforce with patient and family use of appropriate assistive device and precautions (e.g. spinal or hip dislocation precautions)  Outcome: Progressing

## 2023-12-22 NOTE — PROGRESS NOTES
OT orders generated from Functional Mobility Screen. Pt scheduled for L hip orif at 3:10 today. OT eval deferred.  New orders requested pot op when medically appropriate

## 2023-12-23 LAB
DEPRECATED RDW RBC AUTO: 40.5 FL (ref 35.1–46.3)
ERYTHROCYTE [DISTWIDTH] IN BLOOD BY AUTOMATED COUNT: 13 % (ref 11–15)
HCT VFR BLD AUTO: 31.1 %
HGB BLD-MCNC: 10.4 G/DL
MAGNESIUM SERPL-MCNC: 2.1 MG/DL (ref 1.6–2.6)
MCH RBC QN AUTO: 28.3 PG (ref 26–34)
MCHC RBC AUTO-ENTMCNC: 33.4 G/DL (ref 31–37)
MCV RBC AUTO: 84.5 FL
PLATELET # BLD AUTO: 165 10(3)UL (ref 150–450)
RBC # BLD AUTO: 3.68 X10(6)UL
WBC # BLD AUTO: 9 X10(3) UL (ref 4–11)

## 2023-12-23 PROCEDURE — 99233 SBSQ HOSP IP/OBS HIGH 50: CPT | Performed by: HOSPITALIST

## 2023-12-23 RX ORDER — RISPERIDONE 0.25 MG/1
0.25 TABLET ORAL 2 TIMES DAILY PRN
Status: DISCONTINUED | OUTPATIENT
Start: 2023-12-23 | End: 2023-12-28

## 2023-12-23 RX ORDER — POLYETHYLENE GLYCOL 3350 17 G/17G
17 POWDER, FOR SOLUTION ORAL DAILY
Status: DISCONTINUED | OUTPATIENT
Start: 2023-12-23 | End: 2023-12-28

## 2023-12-23 RX ORDER — FAMOTIDINE 20 MG/1
20 TABLET, FILM COATED ORAL DAILY
Status: DISCONTINUED | OUTPATIENT
Start: 2023-12-24 | End: 2023-12-28

## 2023-12-23 RX ORDER — MELATONIN
3 NIGHTLY
Status: DISCONTINUED | OUTPATIENT
Start: 2023-12-23 | End: 2023-12-28

## 2023-12-23 RX ORDER — HALOPERIDOL 5 MG/ML
1 INJECTION INTRAMUSCULAR EVERY 6 HOURS PRN
Status: DISCONTINUED | OUTPATIENT
Start: 2023-12-23 | End: 2023-12-28

## 2023-12-23 NOTE — PLAN OF CARE
Bilateral soft wrist limb restraints applied overnight as patient became very restless and pulling at IV lines post op. Family agreeable with restraints overnight. Video monitoring in place. Daughter, Amarilys Rodriguez, overnight at bedside for additional support. IVF infusing. Con't IV Ancef. Orozco maintained. L hip dressings maintained and in place. Wound care completed this AM. Plan to transition off restraints. PT/OT/SLP eval in AM.     Problem: Patient Centered Care  Goal: Patient preferences are identified and integrated in the patient's plan of care  Description: Interventions:  - What would you like us to know as we care for you? From home alone with caregiver for 4HRS  - Provide timely, complete, and accurate information to patient/family  - Incorporate patient and family knowledge, values, beliefs, and cultural backgrounds into the planning and delivery of care  - Encourage patient/family to participate in care and decision-making at the level they choose  - Honor patient and family perspectives and choices  Outcome: Progressing     Problem: Patient/Family Goals  Goal: Patient/Family Long Term Goal  Description: Patient's Long Term Goal: To go back home    Interventions:  - Surgery  - PT/OT  - Medication compliance  - Follow provider recommendations  - See additional Care Plan goals for specific interventions  Outcome: Progressing  Goal: Patient/Family Short Term Goal  Description: Patient's Short Term Goal: To not fall    Interventions:   - Assistance with mobility  - PT/OT  - Medication compliance  - Follow provider recommendations  - See additional Care Plan goals for specific interventions  Outcome: Progressing     Problem: SAFETY ADULT - FALL  Goal: Free from fall injury  Description: INTERVENTIONS:  - Assess pt frequently for physical needs  - Identify cognitive and physical deficits and behaviors that affect risk of falls.   - [place] fall precautions as indicated by assessment.  - Educate pt/family on patient safety including physical limitations  - Instruct pt to call for assistance with activity based on assessment  - Modify environment to reduce risk of injury  - Provide assistive devices as appropriate  - Consider OT/PT consult to assist with strengthening/mobility  - Encourage toileting schedule  Outcome: Progressing     Problem: PAIN - ADULT  Goal: Verbalizes/displays adequate comfort level or patient's stated pain goal  Description: INTERVENTIONS:  - Encourage pt to monitor pain and request assistance  - Assess pain using appropriate pain scale  - Administer analgesics based on type and severity of pain and evaluate response  - Implement non-pharmacological measures as appropriate and evaluate response  - Consider cultural and social influences on pain and pain management  - Manage/alleviate anxiety  - Utilize distraction and/or relaxation techniques  - Monitor for opioid side effects  - Notify MD/LIP if interventions unsuccessful or patient reports new pain  - Anticipate increased pain with activity and pre-medicate as appropriate  Outcome: Progressing     Problem: MUSCULOSKELETAL - ADULT  Goal: Return mobility to safest level of function  Description: INTERVENTIONS:  - Assess patient stability and activity tolerance for standing, transferring and ambulating w/ or w/o assistive devices  - Assist with transfers and ambulation using safe patient handling equipment as needed  - Ensure adequate protection for wounds/incisions during mobilization  - Obtain PT/OT consults as needed  - Advance activity as appropriate  - Communicate ordered activity level and limitations with patient/family  Outcome: Progressing  Goal: Maintain proper alignment of affected body part  Description: INTERVENTIONS:  - Support and protect limb and body alignment per provider's orders  - Instruct and reinforce with patient and family use of appropriate assistive device and precautions (e.g. spinal or hip dislocation precautions)  Outcome: Progressing     Problem: Safety Risk - Non-Violent Restraints  Goal: Patient will remain free from self-harm  Description: INTERVENTIONS:  - Apply the least restrictive restraint to prevent harm  - Notify patient and family of reasons restraints applied  - Assess for any contributing factors to confusion (electrolyte disturbances, delirium, medications)  - Discontinue any unnecessary medical devices as soon as possible  - Assess the patient's physical comfort, circulation, skin condition, hydration, nutrition and elimination needs   - Reorient and redirection as needed  - Assess for the need to continue restraints  Outcome: Progressing

## 2023-12-23 NOTE — PLAN OF CARE
Patient is alert and oriented to self. Patient having visual hallucinations. Nonsensical speech. IV fluids continued. Orozco catheter removed this afternoon. PT/OT eval completed. Patient off restraints. Problem: Patient Centered Care  Goal: Patient preferences are identified and integrated in the patient's plan of care  Description: Interventions:  - What would you like us to know as we care for you? From home alone with caregiver for 4HRS  - Provide timely, complete, and accurate information to patient/family  - Incorporate patient and family knowledge, values, beliefs, and cultural backgrounds into the planning and delivery of care  - Encourage patient/family to participate in care and decision-making at the level they choose  - Honor patient and family perspectives and choices  Outcome: Progressing     Problem: Patient/Family Goals  Goal: Patient/Family Long Term Goal  Description: Patient's Long Term Goal: To go back home    Interventions:  - Surgery  - PT/OT  - Medication compliance  - Follow provider recommendations  - See additional Care Plan goals for specific interventions  Outcome: Progressing  Goal: Patient/Family Short Term Goal  Description: Patient's Short Term Goal: To not fall    Interventions:   - Assistance with mobility  - PT/OT  - Medication compliance  - Follow provider recommendations  - See additional Care Plan goals for specific interventions  Outcome: Progressing     Problem: SAFETY ADULT - FALL  Goal: Free from fall injury  Description: INTERVENTIONS:  - Assess pt frequently for physical needs  - Identify cognitive and physical deficits and behaviors that affect risk of falls.   - [place] fall precautions as indicated by assessment.  - Educate pt/family on patient safety including physical limitations  - Instruct pt to call for assistance with activity based on assessment  - Modify environment to reduce risk of injury  - Provide assistive devices as appropriate  - Consider OT/PT consult to assist with strengthening/mobility  - Encourage toileting schedule  Outcome: Progressing     Problem: PAIN - ADULT  Goal: Verbalizes/displays adequate comfort level or patient's stated pain goal  Description: INTERVENTIONS:  - Encourage pt to monitor pain and request assistance  - Assess pain using appropriate pain scale  - Administer analgesics based on type and severity of pain and evaluate response  - Implement non-pharmacological measures as appropriate and evaluate response  - Consider cultural and social influences on pain and pain management  - Manage/alleviate anxiety  - Utilize distraction and/or relaxation techniques  - Monitor for opioid side effects  - Notify MD/LIP if interventions unsuccessful or patient reports new pain  - Anticipate increased pain with activity and pre-medicate as appropriate  Outcome: Progressing     Problem: MUSCULOSKELETAL - ADULT  Goal: Return mobility to safest level of function  Description: INTERVENTIONS:  - Assess patient stability and activity tolerance for standing, transferring and ambulating w/ or w/o assistive devices  - Assist with transfers and ambulation using safe patient handling equipment as needed  - Ensure adequate protection for wounds/incisions during mobilization  - Obtain PT/OT consults as needed  - Advance activity as appropriate  - Communicate ordered activity level and limitations with patient/family  Outcome: Progressing  Goal: Maintain proper alignment of affected body part  Description: INTERVENTIONS:  - Support and protect limb and body alignment per provider's orders  - Instruct and reinforce with patient and family use of appropriate assistive device and precautions (e.g. spinal or hip dislocation precautions)  Outcome: Progressing     Problem: Safety Risk - Non-Violent Restraints  Goal: Patient will remain free from self-harm  Description: INTERVENTIONS:  - Apply the least restrictive restraint to prevent harm  - Notify patient and family of reasons restraints applied  - Assess for any contributing factors to confusion (electrolyte disturbances, delirium, medications)  - Discontinue any unnecessary medical devices as soon as possible  - Assess the patient's physical comfort, circulation, skin condition, hydration, nutrition and elimination needs   - Reorient and redirection as needed  - Assess for the need to continue restraints  Outcome: Progressing

## 2023-12-23 NOTE — PHYSICAL THERAPY NOTE
PHYSICAL THERAPY EVALUATION - INPATIENT     Room Number: 644/101-W  Evaluation Date: 12/23/2023  Type of Evaluation: Initial   Physician Order: PT Eval and Treat    Presenting Problem: hip fracture s/p fall at home; s/p L hip IM nail on 12/22     Reason for Therapy: Mobility Dysfunction and Discharge Planning    PHYSICAL THERAPY ASSESSMENT     Patient is a 80year old female admitted 12/20/2023 for fall at home with hip fracture, now s/p L hip IM nail. Patient's current functional deficits include pain, weakness, impaired balance and functional mobility, which are below the patient's pre-admission status. Patient will benefit from continued IP PT services to address these deficits in preparation for discharge. RN approved participation with physical therapy. Pt was received resting in bed and agreeable to activity. Daughter at bedside. Educated on WBAT LLE, benefits of OOB mobility, role of PT and PT plan of care. Pt verbalized understanding however remained confused and disoriented throughout session, mumbling nonsensically; hx of dementia at baseline. Bed mobility: max A x 2 for supine<>sit and scooting to EOB; max A x 2 to scoot upward in bed. SBA for static sitting balance at EOB. Tolerated sitting at EOB up to 8 minutes but declined OOB mobility/transfer/gait despite encouragement and education provided. Pt was left resting in bed with needs within reach, bed alarm on, handoff to RN complete. The patient's Approx Degree of Impairment: 68.66% has been calculated based on documentation in the [facility] '6 clicks' Inpatient Basic Mobility Short Form. Research supports that patients with this level of impairment may benefit from TRICIA. DISCHARGE RECOMMENDATIONS  PT Discharge Recommendations: Sub-acute rehabilitation    PLAN  PT Treatment Plan: Bed mobility; Body mechanics; Endurance; Patient education; Energy conservation;Gait training;Strengthening;Transfer training;Balance training  Rehab Potential : Fair  Frequency (Obs): Daily    PHYSICAL THERAPY MEDICAL/SOCIAL HISTORY     Problem List  Principal Problem:    Closed fracture of left hip, initial encounter ([facility] [address]    HOME SITUATION  Home Situation  Type of Home: Apartment  Home Layout: Elevator  Lives With: Caregiver part-time  Drives: No  Patient Owned Equipment: Rolling walker  Patient Regularly Uses: None     Prior Level of [place]: pt unable to provide PLOF. Per daughter, pt has a part time caregiver present 3 days/week for 4 hours to assist with ADLs; pt has been needing increased assistance recently. Ambulates with RW. SUBJECTIVE  \"Don't touch my leg\" Agreeable to activity. PHYSICAL THERAPY EXAMINATION     OBJECTIVE  Precautions: Limb alert - left;Bed/chair alarm  Fall Risk: High fall risk    WEIGHT BEARING RESTRICTION  Weight Bearing Restriction: L lower extremity  L Lower Extremity: Weight Bearing as Tolerated    PAIN ASSESSMENT  Rating: Unable to rate  Location: states \"don't touch my leg\" when attempting to move LLE  Management Techniques: Body mechanics; Activity promotion;Repositioning    COGNITION  Overall Cognitive Status:  Impaired    RANGE OF MOTION AND STRENGTH ASSESSMENT  Upper extremity ROM and strength are within functional limits. Lower extremity ROM is within functional limits. Lower extremity strength is within functional limits. BALANCE  Static Sitting: Fair +  Dynamic Sitting: Fair  Static Standing: Not tested  Dynamic Standing: Not tested    ACTIVITY TOLERANCE  BP: (!) 133/114  BP Location: Right arm  BP Method: Automatic  Patient Position: Sitting    O2 WALK  Oxygen Therapy  SPO2% on Oxygen at Rest: 95  At rest oxygen flow (liters per minute): 3    AM-PAC '6-Clicks' INPATIENT SHORT FORM - BASIC MOBILITY  How much difficulty does the patient currently have. ..   Patient Difficulty: Turning over in bed (including adjusting bedclothes, sheets and blankets)?: A Lot   Patient Difficulty: Sitting down on and standing up from a chair with arms (e.g., wheelchair, bedside commode, etc.): A Lot   Patient Difficulty: Moving from lying on back to sitting on the side of the bed?: A Lot   How much help from another person does the patient currently need. .. Help from Another: Moving to and from a bed to a chair (including a wheelchair)?: A Lot   Help from Another: Need to walk in hospital room?: A Lot   Help from Another: Climbing 3-5 steps with a railing?: A Lot     AM-PAC Score:  Raw Score: 12   Approx Degree of Impairment: 68.66%   Standardized Score (AM-PAC Scale): 35.33   CMS Modifier (G-Code): CL    FUNCTIONAL ABILITY STATUS  Functional Mobility/Gait Assessment  Gait Assistance: Not tested    Bed Mobility: max A x 2    Transfers: NT    Exercise/Education Provided:  Bed mobility  Body mechanics  Functional activity tolerated    Patient End of Session: In bed;Needs met;Call light within reach;RN aware of session/findings; All patient questions and concerns addressed;SCDs in place; Alarm set; Family present    CURRENT GOALS    Goals to be met by: 12/30/23  Patient Goal Patient's self-stated goal is: go home   Goal #1 Patient is able to demonstrate supine - sit EOB @ level: moderate assistance     Goal #1   Current Status    Goal #2 Patient is able to demonstrate transfers Sit to/from Stand at assistance level: moderate assistance with walker - rolling     Goal #2  Current Status    Goal #3 Patient is able to ambulate 25 feet with assist device: walker - rolling at assistance level: moderate assistance   Goal #3   Current Status    Goal #4    Goal #4   Current Status    Goal #5 Patient to demonstrate independence with home activity/exercise instructions provided to patient in preparation for discharge.    Goal #5   Current Status    Goal #6    Goal #6  Current Status      Patient Evaluation Complexity Level:  History Moderate - 1 or 2 personal factors and/or co-morbidities   Examination of body systems Moderate - addressing a total of 3 or more elements   Clinical Presentation Moderate - Evolving   Clinical Decision Making Moderate Complexity       Therapeutic Activity: 15 minutes

## 2023-12-23 NOTE — PROGRESS NOTES
[facility] Pharmacy Note: Route Optimization for Famotidine (PEPCID)    Patient is currently on Famotidine (PEPCID) 20 mg IV every 24 hours. The patient meets the criteria to convert to the oral equivalent as established by the IV to Oral conversion protocol approved by the P&T committee. Medication was changed from IV formulation to Famotidine (PEPCID) 20 mg PO every 24 hours per protocol.       Holli Leyva, PharmD  12/23/2023,  2:21 PM

## 2023-12-24 PROCEDURE — 99233 SBSQ HOSP IP/OBS HIGH 50: CPT | Performed by: HOSPITALIST

## 2023-12-24 RX ORDER — QUETIAPINE FUMARATE 25 MG/1
12.5 TABLET, FILM COATED ORAL NIGHTLY
Status: DISCONTINUED | OUTPATIENT
Start: 2023-12-24 | End: 2023-12-26

## 2023-12-24 NOTE — SLP NOTE
ADULT SWALLOWING EVALUATION    ASSESSMENT    ASSESSMENT/OVERALL IMPRESSION:  PPE REQUIRED. THIS THERAPIST WORE GLOVES, DROPLET MASK, AND GOGGLES FOR DURATION OF EVALUATION. HANDS WASHED UPON ENTRANCE/EXIT. Per MD H&P, Lien Doll is a(n) 80year old female, past medical history significant for dementia and radiologically diagnosed COPD presents to the ER with a complaint of left hip pain. As per daughter who currently is at bedside patient was attempting to fix her drapes when she tipped over fell on her left side, complained of immediate left hip pain unable to bear weight thereafter. \"    SLP BSSE orders received and acknowledged. A swallow evaluation warranted per modified diet consistency. Pt afebrile with clear vocal quality, on room air, with oxygen saturation at 91%. Pt with hx of dysphagia at [facility], last seen 10/28/19. Pt positioned 90 degrees in bed, alert/cooperative/confused/daughter present. Pt with no complaints of pain. Oral motor examination revealed reduced strength, ROM, and rate of motion. Pt presented with trials of soft solids and thin liquids via straw. Pt with adequate oral acceptance and bilabial seal across all trials. Pt with intact bite, prolonged mastication of solids, and delayed A-P transit. Pt's swallow response appears delayed with reduced hyolaryngeal elevation/excursion. No clinical signs of aspiration (e.g., immediate/delayed throat clear, immediate/delayed cough, wet vocal quality, increased O2 effort) observed across all trials. 12/20 CXR indicates \"Emphysema with mild discoid atelectasis in the upper lungs. Indeterminate 3 centimeter oblong opacity medial left lung base. Indeterminate lucency near the right hemidiaphragm which may be free air, projectional artifact, or the right colon position under the diaphragm\". Oxygen status remained stable t/o the entire evaluation. At this time, pt presents with mild oral dysphagia and probable pharyngeal dysfunction.  Recommend a regular diet (pick softer foods) and thin liquids with strict adherence to safe swallowing compensatory strategies. Results and recommendations reviewed with RN, pt, and family. Pt's family v/u to all results/recommendations. Recommendations remain written on whiteboard. SLP collaborated with RN for MD diet orders. PLAN: SLP to f/u x1-2 meal assessments, monitor imaging, and VFSS if clinically indicated         RECOMMENDATIONS   Diet Recommendations - Solids: Regular (pick softer foods)  Diet Recommendations - Liquids: Thin Liquids                        Compensatory Strategies Recommended: Slow rate;Small bites and sips  Aspiration Precautions: Upright position; Slow rate;Small bites and sips;Cueing to swallow  Medication Administration Recommendations:  (as tolerated)  Treatment Plan/Recommendations: Aspiration precautions  Discharge Recommendations/Plan: Undetermined    HISTORY   MEDICAL HISTORY  Reason for Referral: Altered diet consistency    Problem List  Principal Problem:    Closed fracture of left hip, initial encounter [facility])      Past Medical History  Past Medical History:   Diagnosis Date    Moderate late onset Alzheimer's dementia without behavioral disturbance, psychotic disturbance, mood disturbance, or anxiety ([facility] [address] 09/26/2023       Prior Living Situation: Home with support  Diet Prior to Admission: Regular; Thin liquids  Precautions: Aspiration    Patient/Family Goals: did not state    SWALLOWING HISTORY  Current Diet Consistency: Regular; Thin liquids  Dysphagia History: see above  Imaging Results: se above    SUBJECTIVE       OBJECTIVE   ORAL MOTOR EXAMINATION  Dentition: Functional  Symmetry: Within Functional Limits  Strength:  (reduced)  Tone: Within Functional Limits  Range of Motion:  (reduced)  Rate of Motion: Reduced    Voice Quality: Clear  Respiratory Status: Unlabored  Consistencies Trialed: Thin liquids; Soft solid  Method of Presentation: Staff/Clinician assistance;Straw  Patient Positioning: Upright;Midline    Oral Phase of Swallow: Impaired  Bolus Retrieval: Intact  Bilabial Seal: Intact  Bolus Formation: Impaired  Bolus Propulsion: Impaired  Mastication: Impaired  Retention: Intact    Pharyngeal Phase of Swallow: Impaired  Laryngeal Elevation Timing: Appears impaired  Laryngeal Elevation Strength: Appears impaired  Laryngeal Elevation Coordination: Appears intact  (Please note: Silent aspiration cannot be evaluated clinically. Videofluoroscopic Swallow Study is required to rule-out silent aspiration.)    Esophageal Phase of Swallow: No complaints consistent with possible esophageal involvement  Comments: NA              GOALS  Goal #1 The patient will tolerate regular (pick softer foods) consistency and thin liquids without overt signs or symptoms of aspiration with 100 % accuracy over 1-2 session(s). In Progress   Goal #2 The patient/family/caregiver will demonstrate understanding and implementation of aspiration precautions and swallow strategies independently over 1-2 session(s). In Progress   Goal #3 The patient will utilize compensatory strategies as outlined by  BSSE (clinical evaluation) including Slow rate, Small bites, Small sips, Upright 90 degrees, Upright 90 degrees 30 mins after meal, Eliminate distractions, Supervision with meals with PRN assistance 100 % of the time across 1-2 sessions.   In Progress     FOLLOW UP  Treatment Plan/Recommendations: Aspiration precautions  Number of Visits to Meet Established Goals:  (1-2)  Follow Up Needed (Documentation Required): Yes  SLP Follow-up Date: 12/26/23    Thank you for your referral.   If you have any questions, please contact Kecia Denis, MAAME [place]. [employer] Pathologist  Phone Number Ext. [phone]

## 2023-12-24 NOTE — PLAN OF CARE
Patient is a/ox1. IV fluids continued. Orozco catheter removed this afternoon. Bladder scanned three. Retaining less than 200 ml and didn't urinating. MD notified ( see nurse communication)  Problem: Patient Centered Care  Goal: Patient preferences are identified and integrated in the patient's plan of care  Description: Interventions:  - What would you like us to know as we care for you? From home alone with caregiver for 4HRS  - Provide timely, complete, and accurate information to patient/family  - Incorporate patient and family knowledge, values, beliefs, and cultural backgrounds into the planning and delivery of care  - Encourage patient/family to participate in care and decision-making at the level they choose  - Honor patient and family perspectives and choices  Outcome: Progressing     Problem: Patient/Family Goals  Goal: Patient/Family Long Term Goal  Description: Patient's Long Term Goal: To go back home    Interventions:  - Surgery  - PT/OT  - Medication compliance  - Follow provider recommendations  - See additional Care Plan goals for specific interventions  Outcome: Progressing  Goal: Patient/Family Short Term Goal  Description: Patient's Short Term Goal: To not fall    Interventions:   - Assistance with mobility  - PT/OT  - Medication compliance  - Follow provider recommendations  - See additional Care Plan goals for specific interventions  Outcome: Progressing     Problem: SAFETY ADULT - FALL  Goal: Free from fall injury  Description: INTERVENTIONS:  - Assess pt frequently for physical needs  - Identify cognitive and physical deficits and behaviors that affect risk of falls.   - [place] fall precautions as indicated by assessment.  - Educate pt/family on patient safety including physical limitations  - Instruct pt to call for assistance with activity based on assessment  - Modify environment to reduce risk of injury  - Provide assistive devices as appropriate  - Consider OT/PT consult to assist with strengthening/mobility  - Encourage toileting schedule  Outcome: Progressing     Problem: PAIN - ADULT  Goal: Verbalizes/displays adequate comfort level or patient's stated pain goal  Description: INTERVENTIONS:  - Encourage pt to monitor pain and request assistance  - Assess pain using appropriate pain scale  - Administer analgesics based on type and severity of pain and evaluate response  - Implement non-pharmacological measures as appropriate and evaluate response  - Consider cultural and social influences on pain and pain management  - Manage/alleviate anxiety  - Utilize distraction and/or relaxation techniques  - Monitor for opioid side effects  - Notify MD/LIP if interventions unsuccessful or patient reports new pain  - Anticipate increased pain with activity and pre-medicate as appropriate  Outcome: Progressing     Problem: MUSCULOSKELETAL - ADULT  Goal: Return mobility to safest level of function  Description: INTERVENTIONS:  - Assess patient stability and activity tolerance for standing, transferring and ambulating w/ or w/o assistive devices  - Assist with transfers and ambulation using safe patient handling equipment as needed  - Ensure adequate protection for wounds/incisions during mobilization  - Obtain PT/OT consults as needed  - Advance activity as appropriate  - Communicate ordered activity level and limitations with patient/family  Outcome: Progressing  Goal: Maintain proper alignment of affected body part  Description: INTERVENTIONS:  - Support and protect limb and body alignment per provider's orders  - Instruct and reinforce with patient and family use of appropriate assistive device and precautions (e.g. spinal or hip dislocation precautions)  Outcome: Progressing     Problem: Safety Risk - Non-Violent Restraints  Goal: Patient will remain free from self-harm  Description: INTERVENTIONS:  - Apply the least restrictive restraint to prevent harm  - Notify patient and family of reasons restraints applied  - Assess for any contributing factors to confusion (electrolyte disturbances, delirium, medications)  - Discontinue any unnecessary medical devices as soon as possible  - Assess the patient's physical comfort, circulation, skin condition, hydration, nutrition and elimination needs   - Reorient and redirection as needed  - Assess for the need to continue restraints  Outcome: Progressing

## 2023-12-24 NOTE — PLAN OF CARE
Plan: Follow up with ortho 3 weeks, TRICIA placement, monitor urine output. OOB for meals      Problem: Patient Centered Care  Goal: Patient preferences are identified and integrated in the patient's plan of care  Description: Interventions:  - What would you like us to know as we care for you? From home alone with caregiver for 4HRS  - Provide timely, complete, and accurate information to patient/family  - Incorporate patient and family knowledge, values, beliefs, and cultural backgrounds into the planning and delivery of care  - Encourage patient/family to participate in care and decision-making at the level they choose  - Honor patient and family perspectives and choices  Outcome: Progressing     Problem: Patient/Family Goals  Goal: Patient/Family Long Term Goal  Description: Patient's Long Term Goal: To go back home    Interventions:  - Surgery  - PT/OT  - Medication compliance  - Follow provider recommendations  - See additional Care Plan goals for specific interventions  Outcome: Progressing  Goal: Patient/Family Short Term Goal  Description: Patient's Short Term Goal: To not fall    Interventions:   - Assistance with mobility  - PT/OT  - Medication compliance  - Follow provider recommendations  - See additional Care Plan goals for specific interventions  Outcome: Progressing     Problem: SAFETY ADULT - FALL  Goal: Free from fall injury  Description: INTERVENTIONS:  - Assess pt frequently for physical needs  - Identify cognitive and physical deficits and behaviors that affect risk of falls.   - [place] fall precautions as indicated by assessment.  - Educate pt/family on patient safety including physical limitations  - Instruct pt to call for assistance with activity based on assessment  - Modify environment to reduce risk of injury  - Provide assistive devices as appropriate  - Consider OT/PT consult to assist with strengthening/mobility  - Encourage toileting schedule  Outcome: Progressing     Problem: PAIN - ADULT  Goal: Verbalizes/displays adequate comfort level or patient's stated pain goal  Description: INTERVENTIONS:  - Encourage pt to monitor pain and request assistance  - Assess pain using appropriate pain scale  - Administer analgesics based on type and severity of pain and evaluate response  - Implement non-pharmacological measures as appropriate and evaluate response  - Consider cultural and social influences on pain and pain management  - Manage/alleviate anxiety  - Utilize distraction and/or relaxation techniques  - Monitor for opioid side effects  - Notify MD/LIP if interventions unsuccessful or patient reports new pain  - Anticipate increased pain with activity and pre-medicate as appropriate  Outcome: Progressing     Problem: MUSCULOSKELETAL - ADULT  Goal: Return mobility to safest level of function  Description: INTERVENTIONS:  - Assess patient stability and activity tolerance for standing, transferring and ambulating w/ or w/o assistive devices  - Assist with transfers and ambulation using safe patient handling equipment as needed  - Ensure adequate protection for wounds/incisions during mobilization  - Obtain PT/OT consults as needed  - Advance activity as appropriate  - Communicate ordered activity level and limitations with patient/family  Outcome: Progressing  Goal: Maintain proper alignment of affected body part  Description: INTERVENTIONS:  - Support and protect limb and body alignment per provider's orders  - Instruct and reinforce with patient and family use of appropriate assistive device and precautions (e.g. spinal or hip dislocation precautions)

## 2023-12-24 NOTE — CM/SW NOTE
Social work was able to talk to the patient's daughter Germaine Voss regarding therapy recommendation. Social work advised the patient's daughter that PT/OT is recommending TRICIA. The patient's daughter is agreeable to recommendation. Social work sent Rachelle 12 referral in [facility] and [facility]. Social will provide TRICIA list to patient's daughter when available. SW/CM to remain available for support and/or discharge planning.      Emelyn Grimes MSW, [facility]  Discharge Planner [id number]

## 2023-12-25 LAB
ALBUMIN SERPL-MCNC: 3 G/DL (ref 3.2–4.8)
ANION GAP SERPL CALC-SCNC: 11 MMOL/L (ref 0–18)
BASOPHILS # BLD AUTO: 0.01 X10(3) UL (ref 0–0.2)
BASOPHILS NFR BLD AUTO: 0.1 %
BNP SERPL-MCNC: 299 PG/ML
BUN BLD-MCNC: 14 MG/DL (ref 9–23)
BUN/CREAT SERPL: 29.2 (ref 10–20)
CALCIUM BLD-MCNC: 8.2 MG/DL (ref 8.7–10.4)
CHLORIDE SERPL-SCNC: 101 MMOL/L (ref 98–112)
CO2 SERPL-SCNC: 26 MMOL/L (ref 21–32)
CREAT BLD-MCNC: 0.48 MG/DL
DEPRECATED RDW RBC AUTO: 42 FL (ref 35.1–46.3)
EGFRCR SERPLBLD CKD-EPI 2021: 89 ML/MIN/1.73M2 (ref 60–?)
EOSINOPHIL # BLD AUTO: 0.04 X10(3) UL (ref 0–0.7)
EOSINOPHIL NFR BLD AUTO: 0.5 %
ERYTHROCYTE [DISTWIDTH] IN BLOOD BY AUTOMATED COUNT: 13.2 % (ref 11–15)
GLUCOSE BLD-MCNC: 75 MG/DL (ref 70–99)
HCT VFR BLD AUTO: 33.8 %
HGB BLD-MCNC: 10.7 G/DL
IMM GRANULOCYTES # BLD AUTO: 0.02 X10(3) UL (ref 0–1)
IMM GRANULOCYTES NFR BLD: 0.3 %
LYMPHOCYTES # BLD AUTO: 0.52 X10(3) UL (ref 1–4)
LYMPHOCYTES NFR BLD AUTO: 7.1 %
MAGNESIUM SERPL-MCNC: 1.8 MG/DL (ref 1.6–2.6)
MCH RBC QN AUTO: 27.7 PG (ref 26–34)
MCHC RBC AUTO-ENTMCNC: 31.7 G/DL (ref 31–37)
MCV RBC AUTO: 87.6 FL
MONOCYTES # BLD AUTO: 0.79 X10(3) UL (ref 0.1–1)
MONOCYTES NFR BLD AUTO: 10.8 %
NEUTROPHILS # BLD AUTO: 5.95 X10 (3) UL (ref 1.5–7.7)
NEUTROPHILS # BLD AUTO: 5.95 X10(3) UL (ref 1.5–7.7)
NEUTROPHILS NFR BLD AUTO: 81.2 %
OSMOLALITY SERPL CALC.SUM OF ELEC: 285 MOSM/KG (ref 275–295)
PHOSPHATE SERPL-MCNC: 3.1 MG/DL (ref 2.4–5.1)
PLATELET # BLD AUTO: 180 10(3)UL (ref 150–450)
POTASSIUM SERPL-SCNC: 2.8 MMOL/L (ref 3.5–5.1)
POTASSIUM SERPL-SCNC: 3.9 MMOL/L (ref 3.5–5.1)
RBC # BLD AUTO: 3.86 X10(6)UL
SODIUM SERPL-SCNC: 138 MMOL/L (ref 136–145)
WBC # BLD AUTO: 7.3 X10(3) UL (ref 4–11)

## 2023-12-25 PROCEDURE — 99233 SBSQ HOSP IP/OBS HIGH 50: CPT | Performed by: HOSPITALIST

## 2023-12-25 RX ORDER — POTASSIUM CHLORIDE 14.9 MG/ML
20 INJECTION INTRAVENOUS ONCE
Qty: 100 ML | Refills: 0 | Status: COMPLETED | OUTPATIENT
Start: 2023-12-25 | End: 2023-12-25

## 2023-12-25 RX ORDER — MAGNESIUM OXIDE 400 MG/1
400 TABLET ORAL ONCE
Status: DISCONTINUED | OUTPATIENT
Start: 2023-12-25 | End: 2023-12-28

## 2023-12-25 RX ORDER — GUAIFENESIN 600 MG/1
600 TABLET, EXTENDED RELEASE ORAL 2 TIMES DAILY
Status: DISCONTINUED | OUTPATIENT
Start: 2023-12-25 | End: 2023-12-28

## 2023-12-25 RX ORDER — FUROSEMIDE 10 MG/ML
20 INJECTION INTRAMUSCULAR; INTRAVENOUS ONCE
Status: COMPLETED | OUTPATIENT
Start: 2023-12-25 | End: 2023-12-25

## 2023-12-25 NOTE — PROGRESS NOTES
12/25/23 1045   VISIT TYPE   SLP Inpatient Visit Type (Documentation Required) Attempted Evaluation   FOLLOW UP/PLAN   Follow Up Needed (Documentation Required) Yes   SLP Follow-up Date 12/26/23       SLP re-BSSE orders received and acknowledged. Per RN, patient had been tolerating regular/thin diet with increase in thick secretions with poor oral management. RN attempted limited trial of puree, with patient immediately coughing and expectorating material.  Patient downgraded to NPO. SLP attempted BSSE with daughter present at bedside; patient with limited alertness and inappropriate for BSSE at this time. SLP to re-attempt at later date/time. RN notified. Thank you.     Lyle Schlatter, M.S., [id number] [facility]  Speech Language Pathologist  Phone Number Ext. [phone]

## 2023-12-25 NOTE — PHYSICAL THERAPY NOTE
PHYSICAL THERAPY HIP TREATMENT NOTE - INPATIENT    Room Number: 490/424-K            Presenting Problem: hip fracture s/p fall at home; s/p L hip IM nail on 12/22       Problem List  Principal Problem:    Closed fracture of left hip, initial encounter ([facility] [address]      PHYSICAL THERAPY ASSESSMENT     Pt and family ed with therex in bed with pt presented as somnolent unable to opens her eyes to participate with therex very drowsy. Pt required total lift assist to reposition in bed with assist x 2 for booting and scooting. Pt was not awake enough to tolerate transfers out of bed and treatment was primarily repositioning and PROM d/t somnolence. Pt was not opening eyes to sternal rub but all vitals were WNL. Daughter present and reporting pt has been very sleeping since yesterday. The patient's Approx Degree of Impairment: 81.38% has been calculated based on documentation in the [facility] '6 clicks' Inpatient Basic Mobility Short Form. Research supports that patients with this level of impairment may benefit from TRICIA with PT, OT as medical progress allows. DISCHARGE RECOMMENDATIONS  PT Discharge Recommendations: Sub-acute rehabilitation    PLAN  PT Treatment Plan: Bed mobility; Body mechanics; Endurance; Energy conservation;Patient education;Gait training;Strengthening;Transfer training;Balance training;Range of motion; Family education  Frequency (Obs): Daily    SUBJECTIVE  Drowsy not able to keep her eyes open. OBJECTIVE  Precautions: Limb alert - left;Bed/chair alarm    WEIGHT BEARING RESTRICTION           L Lower Extremity: Weight Bearing as Tolerated    PAIN ASSESSMENT   Rating: Unable to rate  Location: states \"don't touch my leg\" when attempting to move LLE  Management Techniques: Body mechanics; Activity promotion;Repositioning    BALANCE  Static Sitting: Poor -  Dynamic Sitting: Poor -  Static Standing: Dependent  Dynamic Standing: Dependent  ACTIVITY TOLERANCE  Pulse: 106  Heart Rate Source: Monitor  Resp: 18  BP: 154/83  BP Location: Right arm  BP Method: Automatic  Patient Position: Lying    O2 WALK       AM-PAC '6-Clicks' INPATIENT SHORT FORM - BASIC MOBILITY  How much difficulty does the patient currently have. .. Patient Difficulty: Turning over in bed (including adjusting bedclothes, sheets and blankets)?: A Lot   Patient Difficulty: Sitting down on and standing up from a chair with arms (e.g., wheelchair, bedside commode, etc.): A Lot   Patient Difficulty: Moving from lying on back to sitting on the side of the bed?: A Lot   How much help from another person does the patient currently need. .. Help from Another: Moving to and from a bed to a chair (including a wheelchair)?: Total   Help from Another: Need to walk in hospital room?: Total   Help from Another: Climbing 3-5 steps with a railing?: Total     AM-PAC Score:  Raw Score: 9   Approx Degree of Impairment: 81.38%   Standardized Score (AM-PAC Scale): 30.55   CMS Modifier (G-Code): CM    FUNCTIONAL ABILITY STATUS  Functional Mobility/Gait Assessment  Gait Assistance: Maximum assistance    Additional Information: therex in bed heel, slides ankle pumps, repositioning in bed for long sitting       Patient End of Session: In bed; With [facility] staff;Needs met;Call light within reach;RN aware of session/findings; All patient questions and concerns addressed; Alarm set; Family present    CURRENT GOALS      PHYSICAL THERAPY EVALUATION - INPATIENT      Room Number: 819/541-V  Evaluation Date: 12/23/2023  Type of Evaluation: Initial   Physician Order: PT Eval and Treat     Presenting Problem: hip fracture s/p fall at home; s/p L hip IM nail on 12/22  Reason for Therapy: Mobility Dysfunction and Discharge Planning     PHYSICAL THERAPY ASSESSMENT      Patient is a 80year old female admitted 12/20/2023 for fall at home with hip fracture, now s/p L hip IM nail.   Patient's current functional deficits include pain, weakness, impaired balance and functional mobility, which are below the patient's pre-admission status. Patient will benefit from continued IP PT services to address these deficits in preparation for discharge. RN approved participation with physical therapy. Pt was received resting in bed and agreeable to activity. Daughter at bedside. Educated on WBAT LLE, benefits of OOB mobility, role of PT and PT plan of care. Pt verbalized understanding however remained confused and disoriented throughout session, mumbling nonsensically; hx of dementia at baseline. Bed mobility: max A x 2 for supine<>sit and scooting to EOB; max A x 2 to scoot upward in bed. SBA for static sitting balance at EOB. Tolerated sitting at EOB up to 8 minutes but declined OOB mobility/transfer/gait despite encouragement and education provided. Pt was left resting in bed with needs within reach, bed alarm on, handoff to RN complete. The patient's Approx Degree of Impairment: 68.66% has been calculated based on documentation in the [facility] '6 clicks' Inpatient Basic Mobility Short Form. Research supports that patients with this level of impairment may benefit from TRICIA. DISCHARGE RECOMMENDATIONS  PT Discharge Recommendations: Sub-acute rehabilitation     PLAN  PT Treatment Plan: Bed mobility; Body mechanics; Endurance; Patient education; Energy conservation;Gait training;Strengthening;Transfer training;Balance training  Rehab Potential : Fair  Frequency (Obs): Daily     PHYSICAL THERAPY MEDICAL/SOCIAL HISTORY      Problem List  Principal Problem:    Closed fracture of left hip, initial encounter ([facility] [address]     HOME SITUATION  Home Situation  Type of Home: Apartment  Home Layout: Elevator  Lives With: Caregiver part-time  Drives: No  Patient Owned Equipment: Rolling walker  Patient Regularly Uses: None      Prior Level of [place]: pt unable to provide PLOF.  Per daughter, pt has a part time caregiver present 3 days/week for 4 hours to assist with ADLs; pt has been needing increased assistance recently. Ambulates with RW. SUBJECTIVE  \"Don't touch my leg\" Agreeable to activity. PHYSICAL THERAPY EXAMINATION      OBJECTIVE  Precautions: Limb alert - left;Bed/chair alarm  Fall Risk: High fall risk     WEIGHT BEARING RESTRICTION  Weight Bearing Restriction: L lower extremity  L Lower Extremity: Weight Bearing as Tolerated     PAIN ASSESSMENT  Rating: Unable to rate  Location: states \"don't touch my leg\" when attempting to move LLE  Management Techniques: Body mechanics; Activity promotion;Repositioning     COGNITION  Overall Cognitive Status:  Impaired     RANGE OF MOTION AND STRENGTH ASSESSMENT  Upper extremity ROM and strength are within functional limits. Lower extremity ROM is within functional limits. Lower extremity strength is within functional limits. BALANCE  Static Sitting: Fair +  Dynamic Sitting: Fair  Static Standing: Not tested  Dynamic Standing: Not tested     ACTIVITY TOLERANCE  BP: (!) 133/114  BP Location: Right arm  BP Method: Automatic  Patient Position: Sitting     O2 WALK  Oxygen Therapy  SPO2% on Oxygen at Rest: 95  At rest oxygen flow (liters per minute): 3     AM-PAC '6-Clicks' INPATIENT SHORT FORM - BASIC MOBILITY  How much difficulty does the patient currently have. .. Patient Difficulty: Turning over in bed (including adjusting bedclothes, sheets and blankets)?: A Lot   Patient Difficulty: Sitting down on and standing up from a chair with arms (e.g., wheelchair, bedside commode, etc.): A Lot   Patient Difficulty: Moving from lying on back to sitting on the side of the bed?: A Lot   How much help from another person does the patient currently need. ..    Help from Another: Moving to and from a bed to a chair (including a wheelchair)?: A Lot   Help from Another: Need to walk in hospital room?: A Lot   Help from Another: Climbing 3-5 steps with a railing?: A Lot      AM-PAC Score:  Raw Score: 12   Approx Degree of Impairment: 68.66%   Standardized Score (AM-PAC Scale): 35.33   CMS Modifier (G-Code): CL     FUNCTIONAL ABILITY STATUS  Functional Mobility/Gait Assessment  Gait Assistance: Not tested     Bed Mobility: max A x 2     Transfers: NT     Exercise/Education Provided:  Bed mobility  Body mechanics  Functional activity tolerated     Patient End of Session: In bed;Needs met;Call light within reach;RN aware of session/findings; All patient questions and concerns addressed;SCDs in place; Alarm set; Family present     CURRENT GOALS     Goals to be met by: 12/30/23  Patient Goal Patient's self-stated goal is: go home   Goal #1 Patient is able to demonstrate supine - sit EOB @ level: moderate assistance      Goal #1   Current Status  Max A    Goal #2 Patient is able to demonstrate transfers Sit to/from Stand at assistance level: moderate assistance with walker - rolling      Goal #2  Current Status  max A x 2 to EOB   Goal #3 Patient is able to ambulate 25 feet with assist device: walker - rolling at assistance level: moderate assistance   Goal #3   Current Status  Unable NT   Goal #4     Goal #4   Current Status     Goal #5 Patient to demonstrate independence with home activity/exercise instructions provided to patient in preparation for discharge.    Goal #5   Current Status  Ongoing   Goal #6          Therapeutic Exercise: 8 minutes

## 2023-12-25 NOTE — PLAN OF CARE
Pain management. 4L NC and wean as tolerated. Medication adjustments with addition of seroquel. S/P left hip surgery. Plan is TRICIA placement once medically stable.       Problem: MUSCULOSKELETAL - ADULT  Goal: Return mobility to safest level of function  Description: INTERVENTIONS:  - Assess patient stability and activity tolerance for standing, transferring and ambulating w/ or w/o assistive devices  - Assist with transfers and ambulation using safe patient handling equipment as needed  - Ensure adequate protection for wounds/incisions during mobilization  - Obtain PT/OT consults as needed  - Advance activity as appropriate  - Communicate ordered activity level and limitations with patient/family  Outcome: Not Progressing  Goal: Maintain proper alignment of affected body part  Description: INTERVENTIONS:  - Support and protect limb and body alignment per provider's orders  - Instruct and reinforce with patient and family use of appropriate assistive device and precautions (e.g. spinal or hip dislocation precautions)  Outcome: Not Progressing

## 2023-12-26 ENCOUNTER — APPOINTMENT (OUTPATIENT)
Dept: GENERAL RADIOLOGY | Facility: HOSPITAL | Age: 88
DRG: 480 | End: 2023-12-26
Attending: HOSPITALIST
Payer: MEDICARE

## 2023-12-26 ENCOUNTER — APPOINTMENT (OUTPATIENT)
Dept: PICC SERVICES | Facility: HOSPITAL | Age: 88
DRG: 480 | End: 2023-12-26
Attending: INTERNAL MEDICINE
Payer: MEDICARE

## 2023-12-26 PROBLEM — Z51.5 PALLIATIVE CARE BY SPECIALIST: Status: ACTIVE | Noted: 2023-12-26

## 2023-12-26 PROBLEM — Z51.5 PALLIATIVE CARE BY SPECIALIST: Status: ACTIVE | Noted: 2023-01-01

## 2023-12-26 LAB
ALBUMIN SERPL-MCNC: 2.8 G/DL (ref 3.2–4.8)
ANION GAP SERPL CALC-SCNC: 8 MMOL/L (ref 0–18)
BASOPHILS # BLD AUTO: 0.01 X10(3) UL (ref 0–0.2)
BASOPHILS NFR BLD AUTO: 0.1 %
BUN BLD-MCNC: 24 MG/DL (ref 9–23)
BUN/CREAT SERPL: 30.8 (ref 10–20)
CALCIUM BLD-MCNC: 8.1 MG/DL (ref 8.7–10.4)
CHLORIDE SERPL-SCNC: 105 MMOL/L (ref 98–112)
CO2 SERPL-SCNC: 25 MMOL/L (ref 21–32)
CREAT BLD-MCNC: 0.78 MG/DL
DEPRECATED RDW RBC AUTO: 42.4 FL (ref 35.1–46.3)
EGFRCR SERPLBLD CKD-EPI 2021: 71 ML/MIN/1.73M2 (ref 60–?)
EOSINOPHIL # BLD AUTO: 0 X10(3) UL (ref 0–0.7)
EOSINOPHIL NFR BLD AUTO: 0 %
ERYTHROCYTE [DISTWIDTH] IN BLOOD BY AUTOMATED COUNT: 13.5 % (ref 11–15)
GLUCOSE BLD-MCNC: 91 MG/DL (ref 70–99)
GLUCOSE BLDC GLUCOMTR-MCNC: 134 MG/DL (ref 70–99)
GLUCOSE BLDC GLUCOMTR-MCNC: 93 MG/DL (ref 70–99)
HCT VFR BLD AUTO: 30.5 %
HGB BLD-MCNC: 9.9 G/DL
IMM GRANULOCYTES # BLD AUTO: 0.02 X10(3) UL (ref 0–1)
IMM GRANULOCYTES NFR BLD: 0.3 %
LYMPHOCYTES # BLD AUTO: 0.6 X10(3) UL (ref 1–4)
LYMPHOCYTES NFR BLD AUTO: 8.3 %
MAGNESIUM SERPL-MCNC: 1.8 MG/DL (ref 1.6–2.6)
MCH RBC QN AUTO: 28.2 PG (ref 26–34)
MCHC RBC AUTO-ENTMCNC: 32.5 G/DL (ref 31–37)
MCV RBC AUTO: 86.9 FL
MONOCYTES # BLD AUTO: 0.74 X10(3) UL (ref 0.1–1)
MONOCYTES NFR BLD AUTO: 10.2 %
NEUTROPHILS # BLD AUTO: 5.85 X10 (3) UL (ref 1.5–7.7)
NEUTROPHILS # BLD AUTO: 5.85 X10(3) UL (ref 1.5–7.7)
NEUTROPHILS NFR BLD AUTO: 81.1 %
OSMOLALITY SERPL CALC.SUM OF ELEC: 290 MOSM/KG (ref 275–295)
PHOSPHATE SERPL-MCNC: 4.5 MG/DL (ref 2.4–5.1)
PLATELET # BLD AUTO: 180 10(3)UL (ref 150–450)
POTASSIUM SERPL-SCNC: 3.7 MMOL/L (ref 3.5–5.1)
PROCALCITONIN SERPL-MCNC: 0.61 NG/ML (ref ?–0.05)
RBC # BLD AUTO: 3.51 X10(6)UL
SODIUM SERPL-SCNC: 138 MMOL/L (ref 136–145)
WBC # BLD AUTO: 7.2 X10(3) UL (ref 4–11)

## 2023-12-26 PROCEDURE — 99221 1ST HOSP IP/OBS SF/LOW 40: CPT | Performed by: INTERNAL MEDICINE

## 2023-12-26 PROCEDURE — 71045 X-RAY EXAM CHEST 1 VIEW: CPT | Performed by: HOSPITALIST

## 2023-12-26 PROCEDURE — 99233 SBSQ HOSP IP/OBS HIGH 50: CPT | Performed by: INTERNAL MEDICINE

## 2023-12-26 RX ORDER — DEXTROSE MONOHYDRATE, SODIUM CHLORIDE, AND POTASSIUM CHLORIDE 50; 1.49; 4.5 G/1000ML; G/1000ML; G/1000ML
INJECTION, SOLUTION INTRAVENOUS CONTINUOUS
Status: DISCONTINUED | OUTPATIENT
Start: 2023-12-26 | End: 2023-12-28

## 2023-12-26 RX ORDER — POTASSIUM CHLORIDE 14.9 MG/ML
INJECTION INTRAVENOUS
Status: DISCONTINUED
Start: 2023-12-26 | End: 2023-12-26 | Stop reason: WASHOUT

## 2023-12-26 NOTE — SLP NOTE
ADULT SWALLOWING EVALUATION    ASSESSMENT    ASSESSMENT/OVERALL IMPRESSION:  PT COVID-19 POSITIVE. CONTACT AND DROPLET ISOLATION PPE REQUIRED. THIS THERAPIST WORE GOWN, GLOVES, FACE SHIELD, AND DROPLET/N95 RESPIRATOR MASK. HANDS SANITIZED/WASHED UPON ENTRANCE/EXIT. SLP BSSE orders received and acknowledged. A swallow evaluation warranted as pt with decreased ZACHERY and not safe for previous diet of regular solids and thin liquids. Pt afebrile with weak vocal quality, on 3L/Min, with oxygen saturation at 94. Pt positioned upright in bed with daughter at bedside. Pt with no complaints of pain, RN aware. Pt with adequate oral acceptance and bilabial seal across all trials. Pt with increased VIV. Pt's swallow response appears significantly delayed (20+ seconds) with reduced hyolaryngeal elevation/excursion. No clinical signs of aspiration (e.g., immediate/delayed throat clear, immediate/delayed cough, wet vocal quality, increased O2 effort) observed across all trials of small amounts of puree and mildly thick liquids via tsp. Pt requires mod-max redirection to sustain alertness. Oxygen status remained >92 t/o the entire evaluation. Extensive education on aspiration precautions and risks due to decreased ZACHERY. Pt's daughter v/u and expressed she would only feed when pt alert/awake. At this time, pt presents with mild-moderate oral dysphagia and probable pharyngeal dysfunction. Recommend a puree diet and mildly thick liquids VIA TSP with strict adherence to safe swallowing compensatory strategies. Results and recommendations reviewed with RN, pt, and family. Pt's family v/u to all results/recommendations. Recommendations remain written on whiteboard. SLP collaborated with RN for MD diet orders. PLAN: SLP to f/u x3 meal assessments, monitor CXR, and VFSS if clinically warranted.      RECOMMENDATIONS   Diet Recommendations - Solids: Puree  Diet Recommendations - Liquids: Nectar thick liquids/ Mildly thick (via tsp)      Compensatory Strategies Recommended: Liquids via spoon; No straws; Slow rate; Alternate consistencies;Small bites and sips  Aspiration Precautions: Upright position; Slow rate;Small bites and sips; No straw  Medication Administration Recommendations: Crushed in puree  Treatment Plan/Recommendations: Aspiration precautions  Discharge Recommendations/Plan: Undetermined    HISTORY   MEDICAL HISTORY  Reason for Referral: Altered diet consistency    Problem List  Principal Problem:    Closed fracture of left hip, initial encounter [facility])      Past Medical History  Past Medical History:   Diagnosis Date    Moderate late onset Alzheimer's dementia without behavioral disturbance, psychotic disturbance, mood disturbance, or anxiety ([facility] [address] 09/26/2023       Prior Living Situation: Home with support  Diet Prior to Admission: Regular; Thin liquids  Precautions: Aspiration    Patient/Family Goals: Pt's daughter wants pt to eat orally    SWALLOWING HISTORY  Current Diet Consistency: Regular; Thin liquids  Dysphagia History:   CFH 9/12/19: puree/thin  10/28/19: soft/thin    Imaging Results:     CXR 12/26/23:  CONCLUSION:   1. Cardiomegaly. Tortuous thoracic aorta. Mitral annular calcifications. 2. Bibasilar atelectasis and/or scarring. Superimposed left basilar lung consolidation has developed with left-sided effusion.   Follow-up to resolution            Dictated by (CST): Daniel Nichole MD on 12/26/2023 at 7:44 AM       Finalized by (CST): Daniel Nichole MD on 12/26/2023 at 7:46 AM         OBJECTIVE   ORAL MOTOR EXAMINATION  Dentition: Functional  Symmetry: Within Functional Limits  Strength:  (reduced)  Tone: Within Functional Limits  Range of Motion: Within Functional Limits  Rate of Motion: Reduced    Voice Quality: Weak  Respiratory Status: Supplemental O2;Nasal cannula  Consistencies Trialed: Nectar thick liquids;Puree  Method of Presentation: Staff/Clinician assistance;Spoon;Cup;Single sips  Patient Positioning: Upright;Midline    Oral Phase of Swallow: Impaired  Bolus Retrieval: Intact  Bilabial Seal: Intact  Bolus Formation: Impaired  Bolus Propulsion: Impaired  Mastication:  (n/a)  Retention: Intact    Pharyngeal Phase of Swallow: Impaired  Laryngeal Elevation Timing: Appears impaired  Laryngeal Elevation Strength: Appears impaired  Laryngeal Elevation Coordination: Appears impaired  (Please note: Silent aspiration cannot be evaluated clinically. Videofluoroscopic Swallow Study is required to rule-out silent aspiration.)    Esophageal Phase of Swallow: No complaints consistent with possible esophageal involvement    GOALS  Goal #1 The patient will tolerate puree consistency and mildly thick liquids via tsp without overt signs or symptoms of aspiration with 100 % accuracy over 2 session(s). In Progress   Goal #2 The patient/family/caregiver will demonstrate understanding and implementation of aspiration precautions and swallow strategies independently over 3 session(s). In Progress   Goal #3 The patient will tolerate trial upgrade of minced and moist consistency and thin liquids without overt signs or symptoms of aspiration with 90 % accuracy over 2-3 session(s). In Progress   Goal #4 The patient will utilize compensatory strategies as outlined by  BSSE (clinical evaluation) including Slow rate, Small bites, Small sips, Alternate liquids/solids, No straws, Upright 90 degrees, Liquids via tsp amount only, Eliminate distractions, Feed patient with 1:1 feed assistance 100 % of the time across 2 sessions. In Progress       FOLLOW UP  Treatment Plan/Recommendations: Aspiration precautions  Number of Visits to Meet Established Goals: 3  Follow Up Needed (Documentation Required): Yes  SLP Follow-up Date: 12/27/23    Thank you for your referral.   If you have any questions, please contact MAAME Calero [place]  Speech Language Pathologist  Phone Number Ext. [phone]

## 2023-12-26 NOTE — CM/SW NOTE
11: 00AM  SW spoke to pt's dtr Glendon Goltz via phone to f/up on TRICIA discussion. Per Glendon Goltz, pt's dtr Do Harness is handling that. SW attempted Do Harness via phone - no answer. SW left Vmail and requested a call back for further DC planning discussion. TRICIA list available when call returned from [facility]: Met w/ dtr Do Harness at bedside. She confirmed they would like to bring pt home w/ her at DC. Per Do Harness, they will have family and CG round the clock 24/7 for pt. Do Harness confirmed request for hospital bed and bedside commode and possible Home O2 if needed closer to DC. Do Harness confirmed family can lift patient if needed. HH referral sent in [facility]. F2F entered/sent. Dtr's addres: [address] [facility], [place], [address]. MD to document:  Bijal De Leon has diagnosis of COVID19, Dementia, and left total hip repair  which would require them to elevate the head of the bed to reduce shortness of breath and chest pains. Bijal De Leon is bed bound and is not able to reposition herself  and needs the bed to alleviate pain in her hip, back, and chest.  It is in my opinion that a semi-electric hospital bed is reasonable and necessary. Bijal De Leon is physically incapable of utilizing regular toilet facilities because she is confined to a single room with no toilet facilities. MDO entered for bed and [facility] - MD to [facility] as appropriate. Documentation for O2 sats: (RN to add to progress note)  Patient's O2 sat on room air is ____% at rest. Pt's O2 sat on room is ____% when ambulating, and ____% on ____ liter while ambulating. (Reminder: The \"at rest\" and \"while ambulating\" are required statements. If patient is non ambulatory you can use \"with exertion\" such as during a transfer to assess their O2 level)    MD to document AFTER O2 sats:  I had a face to face visit with this patient and I evaluated the patient's O2 saturations. The patient is mobile in their home and is in need of a portable oxygen tank.   The home oxygen will improve the patient's condition. Once O2 sats and MD verbiage are completed and IF home O2 is indicated, SW to place MDO in [facility] and request MD to [facility] as appropriate. (Dx: COVID 19)    UPDATE: MD completed verbiage for bed and BSC. MDO cosigned. Uploaded all to [employer]. SW initiated referral w/ Fabio Chowdhury from [facility]. 01:15PM  HH list of quality data presented to pt's dtr Ronaldo Resendez at bedside for review. 02:15PM  Received MDO for [facility]. Referral sent to [facility] via [employer] Need for DC:  HH choice  Confirm O2 needs (sats/verbiage/MDO entered & cosigned)  Confirm accept from [facility]    PLAN: Home w/ dtr, HH, ref to [facility], and DME (bed/BSC/poss O2) - pending above & med clear       SW/CM to remain available for support and/or discharge planning.          Deyvi Peterson, MSW, [facility]

## 2023-12-27 LAB
ANION GAP SERPL CALC-SCNC: 5 MMOL/L (ref 0–18)
BASOPHILS # BLD AUTO: 0.01 X10(3) UL (ref 0–0.2)
BASOPHILS NFR BLD AUTO: 0.1 %
BUN BLD-MCNC: 25 MG/DL (ref 9–23)
BUN/CREAT SERPL: 41.7 (ref 10–20)
CALCIUM BLD-MCNC: 8.6 MG/DL (ref 8.7–10.4)
CHLORIDE SERPL-SCNC: 104 MMOL/L (ref 98–112)
CO2 SERPL-SCNC: 28 MMOL/L (ref 21–32)
CREAT BLD-MCNC: 0.6 MG/DL
DEPRECATED RDW RBC AUTO: 41.8 FL (ref 35.1–46.3)
EGFRCR SERPLBLD CKD-EPI 2021: 84 ML/MIN/1.73M2 (ref 60–?)
EOSINOPHIL # BLD AUTO: 0.06 X10(3) UL (ref 0–0.7)
EOSINOPHIL NFR BLD AUTO: 0.8 %
ERYTHROCYTE [DISTWIDTH] IN BLOOD BY AUTOMATED COUNT: 13.4 % (ref 11–15)
GLUCOSE BLD-MCNC: 117 MG/DL (ref 70–99)
GLUCOSE BLDC GLUCOMTR-MCNC: 139 MG/DL (ref 70–99)
HCT VFR BLD AUTO: 29.8 %
HGB BLD-MCNC: 9.9 G/DL
IMM GRANULOCYTES # BLD AUTO: 0.03 X10(3) UL (ref 0–1)
IMM GRANULOCYTES NFR BLD: 0.4 %
LYMPHOCYTES # BLD AUTO: 0.47 X10(3) UL (ref 1–4)
LYMPHOCYTES NFR BLD AUTO: 6.1 %
MCH RBC QN AUTO: 28.5 PG (ref 26–34)
MCHC RBC AUTO-ENTMCNC: 33.2 G/DL (ref 31–37)
MCV RBC AUTO: 85.9 FL
MONOCYTES # BLD AUTO: 0.69 X10(3) UL (ref 0.1–1)
MONOCYTES NFR BLD AUTO: 8.9 %
NEUTROPHILS # BLD AUTO: 6.49 X10 (3) UL (ref 1.5–7.7)
NEUTROPHILS # BLD AUTO: 6.49 X10(3) UL (ref 1.5–7.7)
NEUTROPHILS NFR BLD AUTO: 83.7 %
OSMOLALITY SERPL CALC.SUM OF ELEC: 289 MOSM/KG (ref 275–295)
PLATELET # BLD AUTO: 205 10(3)UL (ref 150–450)
POTASSIUM SERPL-SCNC: 3.3 MMOL/L (ref 3.5–5.1)
RBC # BLD AUTO: 3.47 X10(6)UL
SODIUM SERPL-SCNC: 137 MMOL/L (ref 136–145)
WBC # BLD AUTO: 7.8 X10(3) UL (ref 4–11)

## 2023-12-27 PROCEDURE — 99233 SBSQ HOSP IP/OBS HIGH 50: CPT | Performed by: INTERNAL MEDICINE

## 2023-12-27 RX ORDER — AZITHROMYCIN 250 MG/1
500 TABLET, FILM COATED ORAL EVERY 24 HOURS
Status: DISCONTINUED | OUTPATIENT
Start: 2023-12-28 | End: 2023-12-28

## 2023-12-27 RX ORDER — POTASSIUM CHLORIDE 20 MEQ/1
40 TABLET, EXTENDED RELEASE ORAL ONCE
Status: COMPLETED | OUTPATIENT
Start: 2023-12-27 | End: 2023-12-27

## 2023-12-27 NOTE — PROGRESS NOTES
[facility] Pharmacy Note: Route Optimization for Azithromycin [facility])    Patient is currently on Azithromycin (ZITHROMAX) 500 mg IV every 24 hours. The patient meets the criteria to convert to the oral equivalent as established by the IV to Oral conversion protocol approved by the P&T committee. Medication was changed from IV formulation to Azithromycin (ZITHROMAX)  500 mg PO every 24 hours per protocol.       Tonya Barvo, PharmD  12/27/2023,  2:03 PM

## 2023-12-27 NOTE — PHYSICAL THERAPY NOTE
PHYSICAL THERAPY TREATMENT NOTE - INPATIENT     Room Number: 493/021-R       Presenting Problem: hip fracture s/p fall at home; s/p L hip IM nail on 12/22    Problem List  Principal Problem:    Closed fracture of left hip, initial encounter [facility])  Active Problems:    Palliative care by specialist      PHYSICAL THERAPY ASSESSMENT   Chart reviewed. HERNAN Knapp approved participation in physical therapy. PPE worn by therapist: mask, gloves, goggles, gown, and N95. Patient was not wearing a mask during session. Patient presented in bed and alarm activated with unrated pain. Patient with fair  progress towards goals during this session. Education provided on Physical therapy plan of care and physiological benefits of out of bed mobility. Patient with fair carryover. Coordinated session with OT to maximize pt outcomes. Pt agreeable to therapy, gait belt donned for mobility. Daughter at bedside, states pt has 24 hr assist and caregiver in place for return home. Family is able to assist with transfers and total care of pt. PT continues to recommend [facility], but pt able to return home with assistance in place, would benefit from [address] Bed mobility: Max assist x2 supine to sit with HOB elevated and to scoot EOB. Sat EOB about 5 mins prior to transfer with Min A to CGA to maintain static sitting balance. Transfers: Max assist x2 for STS transfers with RW, tactile cues to place hands on RW once up, unable to achieve fully upright standing 2/2 posterior lean. Tolerated static standing about 20 seconds before needing to sit back down. Gait Assistance: Maximum assistance x2  Distance (ft): SPT bed to chair                Patient was left in bedside chair and alarm activated at end of session with all needs in reach. The patient's Approx Degree of Impairment: 81.38% has been calculated based on documentation in the [facility] '6 clicks' Inpatient Basic Mobility Short Form.   Research supports that patients with this level of impairment may benefit from Subacute Rehab, pt with 24 hr family and caregiver assist set up, would benefit from [address]. RN aware of patient status post session. DISCHARGE RECOMMENDATIONS  PT Discharge Recommendations: Sub-acute rehabilitation     PLAN  PT Treatment Plan: Bed mobility; Body mechanics; Endurance; Energy conservation;Patient education;Gait training;Strengthening;Transfer training;Balance training;Range of motion; Family education    SUBJECTIVE  Agreeable to sit in chair     OBJECTIVE  Precautions: Limb alert - left;Bed/chair alarm    WEIGHT BEARING RESTRICTION  Weight Bearing Restriction: L lower extremity           L Lower Extremity: Weight Bearing as Tolerated    PAIN ASSESSMENT   Rating: Unable to rate  Location: L hip  Management Techniques: Activity promotion; Body mechanics    BALANCE                                                                                                                       Static Sitting: Poor +  Dynamic Sitting: Poor -           Static Standing: Dependent  Dynamic Standing: Dependent    ACTIVITY TOLERANCE  Pulse: 86  Heart Rate Source: Monitor                   O2 WALK  Oxygen Therapy  SPO2% on Oxygen at Rest: 96  At rest oxygen flow (liters per minute): 2    AM-PAC '6-Clicks' INPATIENT SHORT FORM - BASIC MOBILITY  How much difficulty does the patient currently have. .. Patient Difficulty: Turning over in bed (including adjusting bedclothes, sheets and blankets)?: A Lot   Patient Difficulty: Sitting down on and standing up from a chair with arms (e.g., wheelchair, bedside commode, etc.): A Lot   Patient Difficulty: Moving from lying on back to sitting on the side of the bed?: A Lot   How much help from another person does the patient currently need. ..    Help from Another: Moving to and from a bed to a chair (including a wheelchair)?: Total   Help from Another: Need to walk in hospital room?: Total   Help from Another: Climbing 3-5 steps with a railing?: Total AM-PAC Score:  Raw Score: 9   Approx Degree of Impairment: 81.38%   Standardized Score (AM-PAC Scale): 30.55   CMS Modifier (G-Code): CM          THERAPEUTIC EXERCISES  Lower Extremity Ankle pumps     Position Sitting       Patient End of Session: Up in chair;Needs met;Call light within reach;RN aware of session/findings; Alarm set; Family present    CURRENT GOALS   Goals to be met by: 12/30/23  Patient Goal Patient's self-stated goal is: go home   Goal #1 Patient is able to demonstrate supine - sit EOB @ level: moderate assistance      Goal #1   Current Status  Max A x2   Goal #2 Patient is able to demonstrate transfers Sit to/from Stand at assistance level: moderate assistance with walker - rolling      Goal #2  Current Status  max A x2 with RW    Goal #3 Patient is able to ambulate 25 feet with assist device: walker - rolling at assistance level: moderate assistance   Goal #3   Current Status Max A x2 SPT bed to chair    Goal #4     Goal #4   Current Status     Goal #5 Patient to demonstrate independence with home activity/exercise instructions provided to patient in preparation for discharge.    Goal #5   Current Status  Ongoing   Goal #6         Therapeutic Activity: 23 minutes    Alda Pratt  [facility]  [phone]

## 2023-12-27 NOTE — CM/SW NOTE
11: 30AM  Per RN rounds, pt remains on O2 w/ no home oxygen. Documentation for O2 sats: (RN to add to progress note)  Patient's O2 sat on room air is ____% at rest. Pt's O2 sat on room is ____% when ambulating, and ____% on ____ liter while ambulating. (Reminder: The \"at rest\" and \"while ambulating\" are required statements. If patient is non ambulatory you can use \"with exertion\" such as during a transfer to assess their O2 level)     MD to document AFTER O2 sats:  I had a face to face visit with this patient and I evaluated the patient's O2 saturations. The patient is mobile in their home and is in need of a portable oxygen tank. The home oxygen will improve the patient's condition. Once O2 sats and MD verbiage are completed and IF home O2 is indicated, SW to place MDO in [place] and request MD to [facility] as appropriate. (Dx: COVID 19)    01:55PM  Per RN Elizabeth Ortega - pt is now on RA. Likely DC tomorrow - dtr asking for DME to be delivered prior to pt's return home. SW spoke to [facility] w/ E - informed her of above. She will f/up w/ HME rep Roly Bernstein and request delivery by tomorrow. SW attempted pt's dtr Arminda Bowling via phone for [facility] choice - no answer. Message also sent to [facility] w/ [facility] to confirm acceptance for [address] UPDATE: MARCIO spoke to Roly Bernstein w/ E - informed that pt will DC tomorrow. Informed her dtr Arminda Bowling would like DME delivered prior to DC. Nancy w/ [facility] confirmed acceptance for [address]    03:00PM  Received notice from Roly Bernstein w/ E - MD verbiage needs to state that pt requires frequent and immediate changes in body position. Updated verbiage below. MARCIO requested Dr. Mesha Silverio complete new verbiage. Alexx Tay has diagnosis of COVID19, Dementia, and left total hip repair  which would require them to elevate the head of the bed to reduce shortness of breath and chest pains. Alexx Tay is bed bound and requires frequent and immediate changes in body position.  [facility] is not able to reposition herself  and needs the bed to alleviate pain in her hip, back, and chest.  It is in my opinion that a semi-electric hospital bed is reasonable and necessary. Need for DC:  HH choice  MD verbiage for bed  Confirm DME delivered      PLAN: Home w/ dtr, HH, ref to [facility], and DME (bed/BSC) - pending above & med clear         SW/CM to remain available for support and/or discharge planning.             Suad Min, MSW, [facility]

## 2023-12-27 NOTE — PROGRESS NOTES
Community palliative referral received, processed, and can accept [facility] left voice mails and call back number for both daughters listed on [facility] facesheet. Thank you for the privilege.   [address]  [facility]  [phone]

## 2023-12-27 NOTE — PROGRESS NOTES
12/27/23 1335   Mobility   O2 walk? Yes   SPO2% on Room Air at Rest 96   SPO2% on Oxygen at Rest 97   At rest oxygen flow (liters per minute) 2   SPO2% Ambulation on Room Air 90   SPO2% Ambulation on Oxygen 95   Ambulation oxygen flow (liters per minute) 2     Patient unable to ambulate. O2 test completed with activity of stand pivot to rolling chair.

## 2023-12-28 VITALS
TEMPERATURE: 98 F | HEIGHT: 63 IN | OXYGEN SATURATION: 93 % | SYSTOLIC BLOOD PRESSURE: 144 MMHG | DIASTOLIC BLOOD PRESSURE: 71 MMHG | BODY MASS INDEX: 16.62 KG/M2 | HEART RATE: 80 BPM | WEIGHT: 93.81 LBS | RESPIRATION RATE: 20 BRPM

## 2023-12-28 LAB — POTASSIUM SERPL-SCNC: 3.8 MMOL/L (ref 3.5–5.1)

## 2023-12-28 PROCEDURE — 99239 HOSP IP/OBS DSCHRG MGMT >30: CPT | Performed by: INTERNAL MEDICINE

## 2023-12-28 RX ORDER — FAMOTIDINE 20 MG/1
20 TABLET, FILM COATED ORAL DAILY
Qty: 30 TABLET | Refills: 0 | Status: SHIPPED | OUTPATIENT
Start: 2023-12-29 | End: 2024-01-28

## 2023-12-28 RX ORDER — MELATONIN
Status: SHIPPED | COMMUNITY
Start: 2023-12-28

## 2023-12-28 RX ORDER — QUETIAPINE FUMARATE 25 MG/1
12.5 TABLET, FILM COATED ORAL 2 TIMES DAILY PRN
Qty: 30 TABLET | Refills: 0 | Status: SHIPPED | OUTPATIENT
Start: 2023-12-28

## 2023-12-28 RX ORDER — ASPIRIN 325 MG
325 TABLET, DELAYED RELEASE (ENTERIC COATED) ORAL 2 TIMES DAILY
Qty: 40 TABLET | Refills: 0 | Status: SHIPPED | OUTPATIENT
Start: 2023-12-28 | End: 2024-01-17

## 2023-12-28 RX ORDER — AMOXICILLIN AND CLAVULANATE POTASSIUM 875; 125 MG/1; MG/1
1 TABLET, FILM COATED ORAL 2 TIMES DAILY
Qty: 10 TABLET | Refills: 0 | Status: SHIPPED | OUTPATIENT
Start: 2023-12-28 | End: 2024-01-02

## 2023-12-28 NOTE — CM/SW NOTE
Patient discussed in rounds, likely discharge today. Spoke w/ Mario Michel w/ [facility], new verbiage sent to [facility] for review. Per Mario Michel, request placed for DME delivery today. Awaiting DME approval.     Will f/u with daughter regarding home health preference. SW to follow. 1p: Per Mario Michel w/ [facility], DME to be delivered to home by 2:30p. Updated Elias Farley, she picked [facility]. Justice Castles made via A[facility], notified of discharge. Kali Davidson w/ [facility] [place]. [facility] for  ([phone]), PCS complete. Confirmed plan w/ Elijah Ward RN & daughter Reyes Gilmore.     Ray Flores, [facility]

## 2023-12-28 NOTE — DISCHARGE INSTRUCTIONS
[facility] APN to follow at discharge. Please call [facility] with any questions, they can be reached by phone at [phone].

## 2023-12-28 NOTE — DISCHARGE PLANNING
[facility] discharging [facility]. Medications reviewed with patients daughter Arminda Bowling. Patients daughter verbalized understanding. [place] care reviewed with daughter. Patients daughter verbalized understanding. All wound care dressings changed. IV removed. Patient is stable for discharge at this time.

## 2023-12-29 ENCOUNTER — PATIENT OUTREACH (OUTPATIENT)
Dept: CASE MANAGEMENT | Age: 88
End: 2023-12-29

## 2023-12-29 ENCOUNTER — TELEPHONE (OUTPATIENT)
Dept: FAMILY MEDICINE CLINIC | Facility: CLINIC | Age: 88
End: 2023-12-29

## 2023-12-29 NOTE — TELEPHONE ENCOUNTER
Left message on mailbox for pt/dtrs to call NCM back for TCM x 2 attempts. Previous pt of Dr. Jain, last office visit 11/18/2021, records also show pt has seen Dr. Romeo (Mag) 3/21/2022.    Patient does not have an appointment scheduled at this time.  TCM appointment recommended by 1/04/2024, as patient is a High risk for readmission.  Please advise.    BOOK BY DATE (last date for TCM): 1/11/2024    Clinical staff:  Please follow-up with patient/dtrs and try to get them to schedule as patient would greatly benefit from TCM appointment.  Thank you!           Follow-up Information    Follow up With Specialties Details Why Contact Info   Igor Field MD Family Medicine Follow up in 2 day(s)  [address] [place] [id number] [phone]

## 2023-12-29 NOTE — PROGRESS NOTES
Left message on mailbox for dtr Laura (CRISTAL verified) to call [facility] back for TCM.  [facility] contact information [phone] included in message.         Discharge Dx:   Intertrochanteric fracture of left hip (HCC) [S72.142A]   12/22/2023 Procedure Performed:   LEFT HIP INTRAMEDULLARY DAVID       Home health--RN, PT/OT  [facility]  [facility] APN to follow at discharge. Please call [facility] with any questions, they can be reached by phone at  [phone]    DME ordered at D/C? Yes  What? IS, goel, home O2/semi-electric bed/BSC from E    Follow up appointments:    Follow-up Information    Follow up With Specialties Details Why Contact Info   Igor Field MD Family Medicine Follow up in 2 day(s)  [address] [place] [id number] [phone]

## 2023-12-29 NOTE — PROGRESS NOTES
Left message on mailbox for pt/dtr Daniela to call [facility] back for TCM.  [facility] contact information [phone] included in message. Sent TE to office staff for appt f/u, as pt high risk for readmission.

## 2024-01-01 ENCOUNTER — TELEPHONE (OUTPATIENT)
Dept: PHYSICAL MEDICINE AND REHAB | Facility: CLINIC | Age: 89
End: 2024-01-01

## 2024-01-01 RX ORDER — DOXYCYCLINE HYCLATE 100 MG
100 TABLET ORAL 2 TIMES DAILY
Qty: 10 TABLET | Refills: 0 | Status: SHIPPED | OUTPATIENT
Start: 2024-01-01 | End: 2024-10-23

## 2024-01-02 NOTE — TELEPHONE ENCOUNTER
Patients daughter said her mom is immobile at this time and that they will have to find a doctor who is able to come to the home

## 2024-01-02 NOTE — PROGRESS NOTES
Left message on mailbox for dtankur Suarez to call [facility] back for TCM.  [facility] contact information [phone] included in message.

## 2024-01-23 ENCOUNTER — MED REC SCAN ONLY (OUTPATIENT)
Dept: PHYSICAL MEDICINE AND REHAB | Facility: CLINIC | Age: 89
End: 2024-01-23

## 2024-01-23 ENCOUNTER — TELEPHONE (OUTPATIENT)
Dept: PHYSICAL MEDICINE AND REHAB | Facility: CLINIC | Age: 89
End: 2024-01-23

## 2024-01-24 NOTE — TELEPHONE ENCOUNTER
PT noted endorsed to  for review/signature.    Once signed will need to fax back to [facility] Fax #: [phone].

## 2024-01-30 ENCOUNTER — TELEPHONE (OUTPATIENT)
Dept: FAMILY MEDICINE CLINIC | Facility: CLINIC | Age: 89
End: 2024-01-30

## 2024-01-30 NOTE — TELEPHONE ENCOUNTER
Received home health report from [facility]. LM with HIPAA authorized daughter Laura to call our office.     Inquire if this pt has changed PCP or transitioned to hospice?

## 2024-02-06 ENCOUNTER — MED REC SCAN ONLY (OUTPATIENT)
Dept: PHYSICAL MEDICINE AND REHAB | Facility: CLINIC | Age: 89
End: 2024-02-06

## 2024-02-07 ENCOUNTER — TELEPHONE (OUTPATIENT)
Dept: FAMILY MEDICINE CLINIC | Facility: CLINIC | Age: 89
End: 2024-02-07

## 2024-02-16 ENCOUNTER — TELEPHONE (OUTPATIENT)
Dept: PHYSICAL MEDICINE AND REHAB | Facility: CLINIC | Age: 89
End: 2024-02-16

## 2024-02-16 DIAGNOSIS — Z51.5 PALLIATIVE CARE BY SPECIALIST: Primary | ICD-10-CM

## 2024-02-16 RX ORDER — QUETIAPINE FUMARATE 25 MG/1
12.5 TABLET, FILM COATED ORAL 2 TIMES DAILY PRN
Qty: 30 TABLET | Refills: 2 | Status: SHIPPED | OUTPATIENT
Start: 2024-02-16

## 2024-02-16 NOTE — TELEPHONE ENCOUNTER
Per Dr. Cedillo patient uses: [facility].     Several phone attempts made to numbers sourced online with no results.      S/w Daughter Daniela who provided Fax #[phone]. Faxed [facility] to given number above.    Upon reverse searching fax number clarified patient uses: [facility]. [facility] orders faxed to given numbers above. Nothing further needed at this time.

## 2024-02-16 NOTE — TELEPHONE ENCOUNTER
Contacted that patient is running low on her seroquel.  She has been progressing well with the home PT and OT.  Recent sun downing has worsened and will have home nursing check UA.

## 2024-02-21 ENCOUNTER — TELEPHONE (OUTPATIENT)
Dept: PHYSICAL MEDICINE AND REHAB | Facility: CLINIC | Age: 89
End: 2024-02-21

## 2024-03-05 ENCOUNTER — MED REC SCAN ONLY (OUTPATIENT)
Dept: PHYSICAL MEDICINE AND REHAB | Facility: CLINIC | Age: 89
End: 2024-03-05

## 2024-03-07 NOTE — TELEPHONE ENCOUNTER
UA & Request for order of ST received from [facility].     UA reviewed by Dr. Cedillo who states it is [facility].Please update family.     ST order signed & faxed to requested #[phone]. Successful fax confirmation received.     Sent signed ST order & UA results to scanning at this time.

## 2024-04-26 ENCOUNTER — TELEPHONE (OUTPATIENT)
Dept: FAMILY MEDICINE CLINIC | Facility: CLINIC | Age: 89
End: 2024-04-26

## 2024-05-17 ENCOUNTER — TELEPHONE (OUTPATIENT)
Dept: PHYSICAL MEDICINE AND REHAB | Facility: CLINIC | Age: 89
End: 2024-05-17

## 2024-05-17 ENCOUNTER — HOME CARE VISIT (OUTPATIENT)
Dept: PHYSICAL MEDICINE AND REHAB | Facility: CLINIC | Age: 89
End: 2024-05-17

## 2024-05-17 DIAGNOSIS — E46 PROTEIN-CALORIE MALNUTRITION, UNSPECIFIED SEVERITY (HCC): ICD-10-CM

## 2024-05-17 DIAGNOSIS — M79.604 RIGHT LEG PAIN: ICD-10-CM

## 2024-05-17 DIAGNOSIS — E86.0 DEHYDRATION: ICD-10-CM

## 2024-05-17 DIAGNOSIS — Z91.81 AT MODERATE RISK FOR FALL: ICD-10-CM

## 2024-05-17 DIAGNOSIS — J18.9 PNEUMONIA OF RIGHT LOWER LOBE DUE TO INFECTIOUS ORGANISM: ICD-10-CM

## 2024-05-17 DIAGNOSIS — J18.9 PNEUMONIA OF RIGHT LOWER LOBE DUE TO INFECTIOUS ORGANISM: Primary | ICD-10-CM

## 2024-05-17 DIAGNOSIS — G30.1 MODERATE LATE ONSET ALZHEIMER'S DEMENTIA WITHOUT BEHAVIORAL DISTURBANCE, PSYCHOTIC DISTURBANCE, MOOD DISTURBANCE, OR ANXIETY (HCC): ICD-10-CM

## 2024-05-17 DIAGNOSIS — F02.B0 MODERATE LATE ONSET ALZHEIMER'S DEMENTIA WITHOUT BEHAVIORAL DISTURBANCE, PSYCHOTIC DISTURBANCE, MOOD DISTURBANCE, OR ANXIETY (HCC): ICD-10-CM

## 2024-05-17 DIAGNOSIS — R26.9 NEUROLOGIC GAIT DYSFUNCTION: ICD-10-CM

## 2024-05-17 DIAGNOSIS — Z91.81 AT MODERATE RISK FOR FALL: Primary | ICD-10-CM

## 2024-05-17 RX ORDER — CLARITHROMYCIN 250 MG/5ML
250 FOR SUSPENSION ORAL 2 TIMES DAILY
Qty: 50 ML | Refills: 1 | Status: SHIPPED | OUTPATIENT
Start: 2024-05-17 | End: 2024-05-20

## 2024-05-17 NOTE — PROGRESS NOTES
This patient is well-known to me.  Care with her family taking care of her for many months now.  She has been getting home nursing and physical therapy.  Patient had a fall last night landing on her right side when she was up ambulating unsupervised.  Patient was complaining of right leg fall.  The family states that the patient's calorie intake has been low as well has her fluid intake.  Apparently the patient has not been urinating much in the last 24 hours.  Been having a mild nonproductive cough for few days.  There is no note of a fever with the patient.    Examination:  Her pulse is running anywhere from   Her blood pressure taken on a cuff that is too large for her arm is 49/31  Her oxygen saturations ranging from 85% to 90%  The patient appears comfortable with some complaints of pain in her right leg.  She is in no acute distress.  Breathing is unlabored, but she does have periods of a nonproductive cough.  Movement of the patient's right leg in internal and external rotation with mild flexion does not give her any pain.  Palpation of the patient's right-sided rib cage does not reproduce any pain.  The patient's proximal clavicle on the right side is more pronounced and tender to palpate.  Patient's heart examination reveals regular rate and rhythm with a pan systolic murmur.  The murmur is most pronounced over the mitral valve area.  Pulmonary examination reveals decreased breath sounds in the right lower lobe of her lung both anteriorly and posteriorly with no wheezes rales or crackles noted.  Have wheezing that is coarse when she is coughing.  There is no significant skin break breakdown on her back or buttock area.    Assessment:  1. At moderate risk for fall    2. Neurologic gait dysfunction    3. Dehydration    4. Right leg pain    5. Pneumonia of right lower lobe due to infectious organism    6. Protein-calorie malnutrition, unspecified severity (HCC)    7. Moderate late onset Alzheimer's  dementia without behavioral disturbance, psychotic disturbance, mood disturbance, or anxiety ([facility])      Plan:  I will look into getting the patient IV fluids through her home health agency.  I will look into giving the patient antibiotics through her home health agency through her local pharmacy.  The antibiotics will be for treating probable pneumonia.  The family does not want the patient going into any healthcare facilities for further treatment at this time new care at home as stated above.

## 2024-05-20 NOTE — TELEPHONE ENCOUNTER
Received message from Dr. Cedillo: \"please cancel this [in relation to [facility]]. She will need home health for RN and PT and OT. With this, =she will need to have a CBC and CMP with a chest x-ray and right hip x-ray which home health can set up, please.\"      Patient uses [facility]. See previous TE (02/16/2024 & 02/21/2024). Fax number for [facility]: #[phone], faxed HH order, CBC, CMP, XR orders all to fax number listed above.      S/w patient's daughter Daniela who verbalized understanding regarding orders placed/sent. Patient's daughter would like copies of orders sent to her home address of: [address] [place] [address]. Orders printed & mailed as requested at this time. Nothing further needed.

## 2024-05-22 ENCOUNTER — TELEPHONE (OUTPATIENT)
Dept: PHYSICAL MEDICINE AND REHAB | Facility: CLINIC | Age: 89
End: 2024-05-22

## 2024-05-22 NOTE — TELEPHONE ENCOUNTER
Spoke with daughter- Daniela who stated patient is already receiving home health through [facility] and would like to continue with them.     Spoke with Pily-JADEN and informed her of the above. Pily also asked for [facility] main office be called to notify them as well at phone #: [phone].     LM on VM for main office [facility] notifying them of above update.     Nothing further needed at this time.

## 2024-05-22 NOTE — TELEPHONE ENCOUNTER
Pily from [facility] phoned to say that she has been trying to get in touch with Pt to go to Pt's home to no avail.  She will try again Friday to get in contact with Pt.

## 2024-05-24 ENCOUNTER — MED REC SCAN ONLY (OUTPATIENT)
Dept: PHYSICAL MEDICINE AND REHAB | Facility: CLINIC | Age: 89
End: 2024-05-24

## 2024-05-24 NOTE — TELEPHONE ENCOUNTER
Results placed in Dr. Cedillo's mailbox for his review. Can be sent to scanning once review is complete.

## 2024-05-31 ENCOUNTER — TELEPHONE (OUTPATIENT)
Dept: PHYSICAL MEDICINE AND REHAB | Facility: CLINIC | Age: 89
End: 2024-05-31

## 2024-05-31 NOTE — TELEPHONE ENCOUNTER
Rcvd Disc of X-ray images of Pt via [employer] from [facility].    Placed in RN folder

## 2024-06-02 ENCOUNTER — HOSPITAL ENCOUNTER (INPATIENT)
Facility: HOSPITAL | Age: 89
LOS: 3 days | Discharge: HOME HEALTH | End: 2024-06-07
Attending: EMERGENCY MEDICINE | Admitting: INTERNAL MEDICINE
Payer: MEDICARE

## 2024-06-02 ENCOUNTER — APPOINTMENT (OUTPATIENT)
Dept: GENERAL RADIOLOGY | Facility: HOSPITAL | Age: 89
End: 2024-06-02
Attending: EMERGENCY MEDICINE
Payer: MEDICARE

## 2024-06-02 ENCOUNTER — HOSPITAL ENCOUNTER (INPATIENT)
Facility: HOSPITAL | Age: 89
LOS: 3 days | Discharge: HOME HEALTH | DRG: 640 | End: 2024-06-07
Attending: EMERGENCY MEDICINE | Admitting: INTERNAL MEDICINE
Payer: MEDICARE

## 2024-06-02 ENCOUNTER — APPOINTMENT (OUTPATIENT)
Dept: GENERAL RADIOLOGY | Facility: HOSPITAL | Age: 89
DRG: 640 | End: 2024-06-02
Attending: EMERGENCY MEDICINE
Payer: MEDICARE

## 2024-06-02 DIAGNOSIS — R53.1 WEAKNESS GENERALIZED: Primary | ICD-10-CM

## 2024-06-02 LAB
ALBUMIN SERPL-MCNC: 2.9 G/DL (ref 3.2–4.8)
ALBUMIN/GLOB SERPL: 1.2 {RATIO} (ref 1–2)
ALP LIVER SERPL-CCNC: 118 U/L
ALT SERPL-CCNC: 7 U/L
ANION GAP SERPL CALC-SCNC: 5 MMOL/L (ref 0–18)
AST SERPL-CCNC: 13 U/L (ref ?–34)
BASOPHILS # BLD AUTO: 0.01 X10(3) UL (ref 0–0.2)
BASOPHILS NFR BLD AUTO: 0.1 %
BILIRUB SERPL-MCNC: 1.3 MG/DL (ref 0.2–0.9)
BUN BLD-MCNC: 21 MG/DL (ref 9–23)
BUN/CREAT SERPL: 32.8 (ref 10–20)
CALCIUM BLD-MCNC: 7.9 MG/DL (ref 8.7–10.4)
CHLORIDE SERPL-SCNC: 102 MMOL/L (ref 98–112)
CO2 SERPL-SCNC: 30 MMOL/L (ref 21–32)
CREAT BLD-MCNC: 0.64 MG/DL
DEPRECATED RDW RBC AUTO: 48.2 FL (ref 35.1–46.3)
EGFRCR SERPLBLD CKD-EPI 2021: 82 ML/MIN/1.73M2 (ref 60–?)
EOSINOPHIL # BLD AUTO: 0.04 X10(3) UL (ref 0–0.7)
EOSINOPHIL NFR BLD AUTO: 0.4 %
ERYTHROCYTE [DISTWIDTH] IN BLOOD BY AUTOMATED COUNT: 15.2 % (ref 11–15)
GLOBULIN PLAS-MCNC: 2.4 G/DL (ref 2–3.5)
GLUCOSE BLD-MCNC: 94 MG/DL (ref 70–99)
HCT VFR BLD AUTO: 26.8 %
HGB BLD-MCNC: 9.1 G/DL
IMM GRANULOCYTES # BLD AUTO: 0.06 X10(3) UL (ref 0–1)
IMM GRANULOCYTES NFR BLD: 0.6 %
LYMPHOCYTES # BLD AUTO: 0.74 X10(3) UL (ref 1–4)
LYMPHOCYTES NFR BLD AUTO: 7 %
MCH RBC QN AUTO: 29.4 PG (ref 26–34)
MCHC RBC AUTO-ENTMCNC: 34 G/DL (ref 31–37)
MCV RBC AUTO: 86.5 FL
MONOCYTES # BLD AUTO: 0.73 X10(3) UL (ref 0.1–1)
MONOCYTES NFR BLD AUTO: 6.9 %
NEUTROPHILS # BLD AUTO: 8.98 X10 (3) UL (ref 1.5–7.7)
NEUTROPHILS # BLD AUTO: 8.98 X10(3) UL (ref 1.5–7.7)
NEUTROPHILS NFR BLD AUTO: 85 %
OSMOLALITY SERPL CALC.SUM OF ELEC: 287 MOSM/KG (ref 275–295)
PLATELET # BLD AUTO: 314 10(3)UL (ref 150–450)
POTASSIUM SERPL-SCNC: 3.6 MMOL/L (ref 3.5–5.1)
PROCALCITONIN SERPL-MCNC: 0.18 NG/ML (ref ?–0.05)
PROT SERPL-MCNC: 5.3 G/DL (ref 5.7–8.2)
RBC # BLD AUTO: 3.1 X10(6)UL
SODIUM SERPL-SCNC: 137 MMOL/L (ref 136–145)
WBC # BLD AUTO: 10.6 X10(3) UL (ref 4–11)

## 2024-06-02 PROCEDURE — 71045 X-RAY EXAM CHEST 1 VIEW: CPT | Performed by: EMERGENCY MEDICINE

## 2024-06-02 PROCEDURE — 99223 1ST HOSP IP/OBS HIGH 75: CPT | Performed by: INTERNAL MEDICINE

## 2024-06-02 PROCEDURE — 99497 ADVNCD CARE PLAN 30 MIN: CPT | Performed by: INTERNAL MEDICINE

## 2024-06-02 RX ORDER — SODIUM CHLORIDE 9 MG/ML
INJECTION, SOLUTION INTRAVENOUS CONTINUOUS
Status: DISCONTINUED | OUTPATIENT
Start: 2024-06-02 | End: 2024-06-06

## 2024-06-02 RX ORDER — ACETAMINOPHEN 500 MG
500 TABLET ORAL EVERY 4 HOURS PRN
Status: DISCONTINUED | OUTPATIENT
Start: 2024-06-02 | End: 2024-06-07

## 2024-06-02 RX ORDER — ACETAMINOPHEN 325 MG/1
650 TABLET ORAL EVERY 4 HOURS PRN
Status: DISCONTINUED | OUTPATIENT
Start: 2024-06-02 | End: 2024-06-07

## 2024-06-02 RX ORDER — QUETIAPINE FUMARATE 25 MG/1
12.5 TABLET, FILM COATED ORAL 2 TIMES DAILY PRN
Status: DISCONTINUED | OUTPATIENT
Start: 2024-06-02 | End: 2024-06-07

## 2024-06-02 RX ORDER — HYDROCODONE BITARTRATE AND ACETAMINOPHEN 5; 325 MG/1; MG/1
1 TABLET ORAL EVERY 4 HOURS PRN
Status: DISCONTINUED | OUTPATIENT
Start: 2024-06-02 | End: 2024-06-07

## 2024-06-02 RX ORDER — MELATONIN
3 NIGHTLY
Status: DISCONTINUED | OUTPATIENT
Start: 2024-06-02 | End: 2024-06-07

## 2024-06-02 RX ORDER — ONDANSETRON 2 MG/ML
4 INJECTION INTRAMUSCULAR; INTRAVENOUS EVERY 6 HOURS PRN
Status: DISCONTINUED | OUTPATIENT
Start: 2024-06-02 | End: 2024-06-07

## 2024-06-02 RX ORDER — HYDROCODONE BITARTRATE AND ACETAMINOPHEN 5; 325 MG/1; MG/1
2 TABLET ORAL EVERY 4 HOURS PRN
Status: DISCONTINUED | OUTPATIENT
Start: 2024-06-02 | End: 2024-06-07

## 2024-06-02 RX ORDER — HEPARIN SODIUM 5000 [USP'U]/ML
5000 INJECTION, SOLUTION INTRAVENOUS; SUBCUTANEOUS EVERY 8 HOURS SCHEDULED
Status: DISCONTINUED | OUTPATIENT
Start: 2024-06-02 | End: 2024-06-07

## 2024-06-02 RX ORDER — SODIUM CHLORIDE 9 MG/ML
INJECTION, SOLUTION INTRAVENOUS CONTINUOUS
Status: ACTIVE | OUTPATIENT
Start: 2024-06-02 | End: 2024-06-02

## 2024-06-02 NOTE — ED QUICK NOTES
Orders for admission, patient is aware of plan and ready to go upstairs. Any questions, please call ED HERNAN Bell at extension [phone].     Patient Covid vaccination status: Unvaccinated     COVID Test Ordered in ED: None    COVID Suspicion at Admission: N/A    Running Infusions:  None    Mental Status/LOC at time of transport: A&Ox1    Other pertinent information: patient unable to ambulate due to weakness  CIWA score: N/A   NIH score:  N/A

## 2024-06-02 NOTE — H&P
[facility]  part of [facility]  HISTORY AND PHYSICAL       Joy Jenkins Patient Status:  Emergency    1931  89 year old CSN [id number]   Location  Attending Evaristo Roman MD     PCP Igor Field MD         DATE OF ADMISSION: 2024     CHIEF COMPLAINT: Decreased appetite    HISTORY OF PRESENT ILLNESS  This is a 89 year oldfemale who is brought in by family due to decreased appetite and inability to eat at home.  History from patient extremely limited.  Patient with history of dementia.  Patient AAO times person only.  When asked why patient was at the hospital she stated she was unsure.  Did not realize she was at the hospital.  Patient denied current pain.  Patient otherwise denied chest pain, shortness of breath, abdominal pain, nausea vomit, fevers or chills.  Further history from patient's daughter and granddaughter at bedside.  Daughter states patient had a fall several weeks prior.  She was diagnosed with a hip fracture by her PCP at that time.  Due to patient's dementia and previous experience with anesthesia, daughter made decision not to pursue further evaluation and did not want surgery.  Daughter reports that since that time, patient's appetite has progressively declined.  Daughter states patient is able to transfer and is up and out of bed at home with assistance.  Daughter states they have brought patient to hospital as they wish to have a feeding tube placed to help with nutrition as their concern is that she is not healing due to poor nutritional status from decreased oral intake.    PAST MEDICAL HISTORY  Past Medical History:    Moderate late onset Alzheimer's dementia without behavioral disturbance, psychotic disturbance, mood disturbance, or anxiety (HCC)        PAST SURGICAL HISTORY  Past Surgical History:   Procedure Laterality Date    Tonsillectomy      Umbilical hernia repair         ALLERGIES   Bactrim ds, Ciprofloxacin, Penicillins, and  Sulfamethoxazole w/trimethoprim    MEDICATIONS  Patient's Medications   New Prescriptions    No medications on file   Previous Medications    MELATONIN 3 MG ORAL TAB        QUETIAPINE 25 MG ORAL TAB    Take 0.5 tablets (12.5 mg total) by mouth 2 (two) times daily as needed (agitation).   Modified Medications    No medications on file   Discontinued Medications    No medications on file       SOCIAL HISTORY  Social History     Socioeconomic History    Marital status:    Tobacco Use    Smoking status: Former    Smokeless tobacco: Never   Vaping Use    Vaping status: Never Used   Substance and Sexual Activity    Alcohol use: Yes     Alcohol/week: 1.0 standard drink of alcohol     Types: 1 Glasses of wine per week    Drug use: No   Other Topics Concern    Pt has a pacemaker No    Pt has a defibrillator No    Reaction to local anesthetic No       FAMILY HISTORY  History reviewed. No pertinent family history.    PHYSICAL EXAM  Vital signs:  /71   Pulse 84   Temp 98.2 °F (36.8 °C) (Oral)   Resp 20   SpO2 95%      GENERAL: Thin, frail female.  Awake and alert, in no acute distress.  HEART:  Regular rhythm.  Regular rate   LUNGS:  Air entry was minimally decreased.  No increased work of breathing or wheezes   ABDOMEN: Soft and non-tender.   NEUROLOGICAL: AAO x 1, person  PSYCHIATRIC: Normal mood      IMAGING  XR CHEST AP PORTABLE  (CPT=71045)    Result Date: 6/2/2024  CONCLUSION:  Patchy right basilar airspace opacity, concerning for aspiration/pneumonia.  Trace right pleural effusion.  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   Dictated by (CST): Radha Stone MD on 6/02/2024 at 4:19 PM     Finalized by (CST): Radha Stone MD on 6/02/2024 at 4:23 PM           Data:  Recent Labs   Lab 06/02/24  1532   RBC 3.10*   HGB 9.1*   HCT 26.8*   MCV 86.5   MCH 29.4   MCHC 34.0   RDW 15.2*   NEPRELIM 8.98*   WBC 10.6   .0      Recent Labs   Lab 06/02/24  1532   GLU 94   BUN 21   CREATSERUM 0.64   CA 7.9*   ALB 2.9*      K 3.6      CO2 30.0   ALKPHO 118   AST 13   ALT 7*   BILT 1.3*   TP 5.3*       ASSESSMENT/PLAN    Failure to thrive  Anorexia  -Patient with history of dementia  -Status post fall with recent hip fracture  -Brought in by family due to decreased oral intake  -Family seeking PEG tube placement for nutrition  -Agreeable to palliative care consultation  -Consult dietary  -Symptom relief as able    Fall  Reported right hip fracture  -Daughter states patient with fall at home.  -Was managed by PCP.  -No imaging on file at this time, offered repeat imaging of hip and orthopedic consult, but daughter declined.  -PT/OT evaluation  -Pain control as needed    Abnormal chest x-ray  -Patient without subjective shortness of breath  -Patient without signs of hypoxia  -Chest x-ray reviewed, noted patchy right basilar airspace opacity  -Some concern for possible aspiration  -Keep n.p.o. until cleared by SLP.  -Without current signs of infection  -Procalcitonin low  -Monitoring off antibiotics.    Advanced Care Planning:  Discussion held at bedside with patient's daughter, POA, regarding code status, diagnosis, prognosis, and goals of care.  Discussed patient's baseline mental status and new fall with hip fracture.  Patient brought in for failure to thrive and poor oral intake.  Daughter requesting PEG tube placement.  Agreeable to palliative care consult, order placed.  At this time, daughter states she would like patient to remain full code.    ACP time 25 min      Plan of care discussed with patient and daughter at bedside.  Discussed with ED physician and RN. Decision made that pt needs hospitalization for further management/monitoring.      Carlos Jones MD    This note was prepared using Dragon Medical voice recognition dictation software. As a result errors may occur. When identified these errors have been  corrected. While every attempt is made to correct errors during dictation discrepancies may still exist

## 2024-06-02 NOTE — ED INITIAL ASSESSMENT (HPI)
Patient from home with daughter, per daughter, patient was advised to come to ED due to failure to thrive. Patient has been having increasing weakness and has been unable to get around the house. PCP got x ray about a week ago which showed possible pnemonia. Per call in by provider, patient also has a hemoglobin less than 7. Patient has a hx of dementia. Vital signs stable.

## 2024-06-03 LAB
ANION GAP SERPL CALC-SCNC: 7 MMOL/L (ref 0–18)
BASOPHILS # BLD AUTO: 0.01 X10(3) UL (ref 0–0.2)
BASOPHILS NFR BLD AUTO: 0.1 %
BUN BLD-MCNC: 18 MG/DL (ref 9–23)
BUN/CREAT SERPL: 34 (ref 10–20)
C DIFF GDH + TOXINS A+B STL QL IA.RAPID: DETECTED
C DIFF TOX B STL QL: POSITIVE
CALCIUM BLD-MCNC: 7.6 MG/DL (ref 8.7–10.4)
CHLORIDE SERPL-SCNC: 102 MMOL/L (ref 98–112)
CO2 SERPL-SCNC: 26 MMOL/L (ref 21–32)
CREAT BLD-MCNC: 0.53 MG/DL
DEPRECATED RDW RBC AUTO: 48.5 FL (ref 35.1–46.3)
EGFRCR SERPLBLD CKD-EPI 2021: 86 ML/MIN/1.73M2 (ref 60–?)
EOSINOPHIL # BLD AUTO: 0.05 X10(3) UL (ref 0–0.7)
EOSINOPHIL NFR BLD AUTO: 0.5 %
ERYTHROCYTE [DISTWIDTH] IN BLOOD BY AUTOMATED COUNT: 15.2 % (ref 11–15)
GLUCOSE BLD-MCNC: 77 MG/DL (ref 70–99)
HCT VFR BLD AUTO: 24.8 %
HGB BLD-MCNC: 8.4 G/DL
IMM GRANULOCYTES # BLD AUTO: 0.06 X10(3) UL (ref 0–1)
IMM GRANULOCYTES NFR BLD: 0.6 %
INR BLD: 1.12 (ref 0.8–1.2)
LYMPHOCYTES # BLD AUTO: 0.52 X10(3) UL (ref 1–4)
LYMPHOCYTES NFR BLD AUTO: 5.6 %
MAGNESIUM SERPL-MCNC: 1.9 MG/DL (ref 1.6–2.6)
MCH RBC QN AUTO: 29.4 PG (ref 26–34)
MCHC RBC AUTO-ENTMCNC: 33.9 G/DL (ref 31–37)
MCV RBC AUTO: 86.7 FL
MONOCYTES # BLD AUTO: 0.62 X10(3) UL (ref 0.1–1)
MONOCYTES NFR BLD AUTO: 6.7 %
NEUTROPHILS # BLD AUTO: 8.03 X10 (3) UL (ref 1.5–7.7)
NEUTROPHILS # BLD AUTO: 8.03 X10(3) UL (ref 1.5–7.7)
NEUTROPHILS NFR BLD AUTO: 86.5 %
OSMOLALITY SERPL CALC.SUM OF ELEC: 281 MOSM/KG (ref 275–295)
PHOSPHATE SERPL-MCNC: 3.5 MG/DL (ref 2.4–5.1)
PLATELET # BLD AUTO: 316 10(3)UL (ref 150–450)
POTASSIUM SERPL-SCNC: 3.4 MMOL/L (ref 3.5–5.1)
PROTHROMBIN TIME: 15.1 SECONDS (ref 11.6–14.8)
RBC # BLD AUTO: 2.86 X10(6)UL
SODIUM SERPL-SCNC: 135 MMOL/L (ref 136–145)
WBC # BLD AUTO: 9.3 X10(3) UL (ref 4–11)

## 2024-06-03 PROCEDURE — 99223 1ST HOSP IP/OBS HIGH 75: CPT | Performed by: INTERNAL MEDICINE

## 2024-06-03 PROCEDURE — 99222 1ST HOSP IP/OBS MODERATE 55: CPT | Performed by: INTERNAL MEDICINE

## 2024-06-03 PROCEDURE — 99233 SBSQ HOSP IP/OBS HIGH 50: CPT | Performed by: INTERNAL MEDICINE

## 2024-06-03 RX ORDER — MULTIPLE VITAMINS W/ MINERALS TAB 9MG-400MCG
1 TAB ORAL DAILY
Status: DISCONTINUED | OUTPATIENT
Start: 2024-06-03 | End: 2024-06-06

## 2024-06-03 RX ORDER — VANCOMYCIN HYDROCHLORIDE 50 MG/ML
125 KIT ORAL 4 TIMES DAILY
Status: DISCONTINUED | OUTPATIENT
Start: 2024-06-03 | End: 2024-06-07

## 2024-06-03 RX ORDER — THIAMINE HYDROCHLORIDE 100 MG/ML
100 INJECTION, SOLUTION INTRAMUSCULAR; INTRAVENOUS DAILY
Status: DISCONTINUED | OUTPATIENT
Start: 2024-06-03 | End: 2024-06-06

## 2024-06-03 RX ORDER — METRONIDAZOLE 500 MG/100ML
500 INJECTION, SOLUTION INTRAVENOUS EVERY 8 HOURS
Status: DISCONTINUED | OUTPATIENT
Start: 2024-06-03 | End: 2024-06-03

## 2024-06-03 NOTE — CONSULTS
Palliative Care Initial Inpatient Consult    Joy Jenkins Patient Status:  Observation    1931 MRN [id number]   Location [facility] 5SW/SE Attending Sherri Overton MD   Hosp Day # 0 PCP Igor Field MD     Date of Consult: 6/3/2024  Patient seen at: [facility]    Reason for Consultation: Consult requested for goals of care and care coordination.    Subjective     History of Present Illness: This is a 89 year oldfemale who is brought in by family due to decreased appetite and inability to eat at home.  History from patient extremely limited.  Patient with history of dementia.  Patient AAO times person only.  When asked why patient was at the hospital she stated she was unsure.  Did not realize she was at the hospital.  Patient denied current pain.  Patient otherwise denied chest pain, shortness of breath, abdominal pain, nausea vomit, fevers or chills.  Further history from patient's daughter at bedside.  Daughter states patient had a fall several weeks prior.  She was diagnosed with a hip fracture by her PCP at that time.  Due to patient's dementia and previous experience with anesthesia, daughter made decision not to pursue further evaluation and did not want surgery.  Daughter reports that since that time, patient's appetite has progressively declined.  Daughter states patient is able to transfer and is up and out of bed at home with assistance.  Daughter states they have brought patient to hospital as they wish to have a feeding tube placed to help with nutrition as their concern is that she is not healing due to poor nutritional status from decreased oral intake. Our palliative care service was asked to evaluate the patient for a goals of care discussion and symptom management.      Review of Systems: Palliative Care symptom needs assessed:     Unable to obtain     Palliative Care Social History:   Marital Status:    Children: Yes  Living Situation Prior to Admit:  Home  Occupational History: Retired  Personal: Lives with daughter Daniela Jenkins  URMILA     requested: No    Medical History: obtained from [facility]  Past Medical History:    Moderate late onset Alzheimer's dementia without behavioral disturbance, psychotic disturbance, mood disturbance, or anxiety (HCC)     Past Surgical History:   Procedure Laterality Date    Fracture surgery      Tonsillectomy      Umbilical hernia repair  1988       Family History: obtained from [facility]  History reviewed. No pertinent family history.    Allergies:  Allergies   Allergen Reactions    Bactrim Ds OTHER (SEE COMMENTS)    Ciprofloxacin OTHER (SEE COMMENTS)    Penicillins OTHER (SEE COMMENTS)     Tolerate amoxicillin    Sulfamethoxazole W/Trimethoprim OTHER (SEE COMMENTS)       Medications:     Current Facility-Administered Medications:     potassium chloride 40 mEq in 250mL sodium chloride 0.9% IVPB premix, 40 mEq, Intravenous, Once    ampicillin-sulbactam (Unasyn) 3 g in sodium chloride 0.9% 100mL IVPB-ADD, 3 g, Intravenous, q6h    metRONIDAZOLE in sodium chloride 0.79% (Flagyl) 5 mg/mL IVPB premix 500 mg, 500 mg, Intravenous, Q8H    melatonin tab 3 mg, 3 mg, Oral, Nightly    QUEtiapine (SEROquel) tab 12.5 mg, 12.5 mg, Oral, BID PRN    sodium chloride 0.9% infusion, , Intravenous, Continuous    heparin (Porcine) 5000 UNIT/ML injection 5,000 Units, 5,000 Units, Subcutaneous, Q8H ANGELA    acetaminophen (Tylenol Extra Strength) tab 500 mg, 500 mg, Oral, Q4H PRN    acetaminophen (Tylenol) tab 650 mg, 650 mg, Oral, Q4H PRN **OR** HYDROcodone-acetaminophen (Norco) 5-325 MG per tab 1 tablet, 1 tablet, Oral, Q4H PRN **OR** HYDROcodone-acetaminophen (Norco) 5-325 MG per tab 2 tablet, 2 tablet, Oral, Q4H PRN    ondansetron (Zofran) 4 MG/2ML injection 4 mg, 4 mg, Intravenous, Q6H PRN  No current outpatient medications on file.         Palliative Performance Scale: 40 %  % Ambulation Activity Level Self-Care Intake Consciousness   100  Full  Normal  No Disease Full Normal Full   90 Full  Normal  Some Disease Full Normal Full   80 Full  Normal w/effort  Some Disease Full Normal or reduced Full   70 Reduced  Can't Perform Job  Some Disease Full Normal or reduced Full   60 Reduced  Can't Perform Hobby   Significant Disease Occ Assist Normal or reduced Full or confused   50 Mainly sit/lie Can't do any work  Extensive Disease Partial Assist Normal or reduced Full or confused   40 Mainly in bed Can't do any work  Extensive Disease Mainly Assist Normal or reduced Full or confused   30 Bed Bound Can't do any work  Extensive Disease Max Assist  Total Care Reduced  Drowsy/confused   20 Bed Bound Can't do any work  Extensive Disease Max Assist  Total Care Minimal  Drowsy/confused   10 Bed Bound Can't do any work  Extensive Disease Max Assist  Total Care Mouth Care  Drowsy/confused   0 Death        Objective      Vital Signs:  Blood pressure 110/54, pulse 81, temperature 97.7 °F (36.5 °C), temperature source Oral, resp. rate 18, weight 80 lb 9.6 oz (36.6 kg), SpO2 94%.  Body mass index is 14.28 kg/m².  Non-verbal signs of pain present: No    Physical Exam:  General: Alert, awake and in no  apparent respiratory distress. Cachectic.  HEENT: AT/NC. No focal deficits. Edentulous  Cardiac: RRR  Lungs: Normal effort on RA  Abdomen: Soft, non-tender, non-distended, normal bowel sounds X 4 quadrants   Extremities: FROM of all limbs, no  Edema present  Skin: Warm and dry.    Hematology:  Lab Results   Component Value Date    WBC 9.3 06/03/2024    HGB 8.4 (L) 06/03/2024    HCT 24.8 (L) 06/03/2024    .0 06/03/2024       Coags:  Lab Results   Component Value Date    INR 1.12 06/03/2024       Chemistry:  Lab Results   Component Value Date    CREATSERUM 0.53 (L) 06/03/2024    BUN 18 06/03/2024     (L) 06/03/2024    K 3.4 (L) 06/03/2024     06/03/2024    CO2 26.0 06/03/2024    GLU 77 06/03/2024    CA 7.6 (L) 06/03/2024    ALB 2.9 (L) 06/02/2024    ALKPHO  118 06/02/2024    BILT 1.3 (H) 06/02/2024    TP 5.3 (L) 06/02/2024    AST 13 06/02/2024    ALT 7 (L) 06/02/2024    MG 1.9 06/03/2024    PHOS 4.5 12/26/2023       Imaging:  XR CHEST AP PORTABLE  (CPT=71045)    Result Date: 6/2/2024  CONCLUSION:  Patchy right basilar airspace opacity, concerning for aspiration/pneumonia.  Trace right pleural effusion.  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   Dictated by (CST): Radha Stone MD on 6/02/2024 at 4:19 PM     Finalized by (CST): Radha Stone MD on 6/02/2024 at 4:23 PM           Assessment and Recommendations        Weakness generalized      Symptom Management      NA    2.     Serious Illness Conversation:   Code Status: Full  POA: Daughter Laura (on file)  I met Joy and daughter Daniela at bedside. Joy lives with Daniela at home and requires caregivers and assistance with ADLs. She is non ambulatory but can transition to commode.   They are concerned about her weight loss and so inquiring about a PEG tube. I explained that the PEG tube would not likely prevent aspiration, if that is the cause of her PNA. They are not yet shown to increase life expectancy in dementia according to our best available evidence.  I also asked about code status: Daniela said that they would not want her to be coded if she were lying in bed or asleep and dying in a natural way, but if there were something reversible, then they would want a consideration for CPR.   They were offered palliative care at home but felt there was too much overlap with home health care which they already had. I explained that it would be a good bridge to full hospice care, and Daniela agreed that was reasonable. I will follow up with additional information about hospice for her so that they can consider enrollment if Joy were to further decline.       Palliative Care Follow Up: Palliative care team will Continue to follow while  inpatient    Thank you for allowing Palliative Care services to participate in the care of Joy Jenkins.    A total of 55 minutes were spent on this visit, which included all of the following: direct face to face contact, history taking, physical examination, and >50% was spent counseling and coordinating care.    Carlos Beck MD  6/3/2024  12:01 PM  Palliative Care Services  [facility] [phone]    Note to patient:  The 21st Century Cures Act makes medical notes like these available to patients in the interest of transparency. However, be advised this is a medical document. It is intended as peer to peer communication. It is written in medical language and may contain abbreviations or verbiage that are unfamiliar. It may appear blunt or direct. Medical documents are intended to carry relevant information, facts as evident, and the clinical opinion of the practitioner.

## 2024-06-03 NOTE — CONSULTS
[facility]  Report of Consultation    Joy Jenkins Patient Status:  Observation    1931 MRN [id number]   Location [facility] 5SW/SE Attending Sherri Overton MD   Hosp Day # 0 PCP Igor Field MD     Reason for Consultation:  Failure to thrive  -Aspiration pneumonia/dysphagia    History of Present Illness:  Joy Jenkins is a a(n) 89 year old female.  Has a history of Alzheimer's dementia, left hip fracture who has had a 10 pound weight loss over the coming weeks, poor appetite and poor p.o. intake.  Patient was admitted for ongoing management.    The patient's daughter is at the bedside, she gives a history the patient has had limited oral intake.  She does have poor dentition and is difficult for her to chew her food.  She has lost about 10 pounds over several weeks.  There appears to be no abdominal pain.  She has had some diarrhea recently and has been diagnosed with C. difficile.    Prior umbilical hernia repair with midline surgical incision.    History:  Past Medical History:    Moderate late onset Alzheimer's dementia without behavioral disturbance, psychotic disturbance, mood disturbance, or anxiety ([facility])     Past Surgical History:   Procedure Laterality Date    Fracture surgery      Tonsillectomy      Umbilical hernia repair       History reviewed. No pertinent family history.   reports that she has quit smoking. She has never used smokeless tobacco. She reports current alcohol use of about 1.0 standard drink of alcohol per week. She reports that she does not use drugs.    Allergies:  Allergies   Allergen Reactions    Bactrim Ds OTHER (SEE COMMENTS)    Ciprofloxacin OTHER (SEE COMMENTS)    Penicillins OTHER (SEE COMMENTS)     Tolerate amoxicillin    Sulfamethoxazole W/Trimethoprim OTHER (SEE COMMENTS)       Medications:    Current Facility-Administered Medications:     ampicillin-sulbactam (Unasyn) 3 g in sodium chloride 0.9% 100mL IVPB-ADD, 3 g, Intravenous, q6h    vancomycin (Firvanq)  50 mg/mL oral solution 125 mg, 125 mg, Oral, QID    melatonin tab 3 mg, 3 mg, Oral, Nightly    QUEtiapine (SEROquel) tab 12.5 mg, 12.5 mg, Oral, BID PRN    sodium chloride 0.9% infusion, , Intravenous, Continuous    heparin (Porcine) 5000 UNIT/ML injection 5,000 Units, 5,000 Units, Subcutaneous, Q8H ANGELA    acetaminophen (Tylenol Extra Strength) tab 500 mg, 500 mg, Oral, Q4H PRN    acetaminophen (Tylenol) tab 650 mg, 650 mg, Oral, Q4H PRN **OR** HYDROcodone-acetaminophen (Norco) 5-325 MG per tab 1 tablet, 1 tablet, Oral, Q4H PRN **OR** HYDROcodone-acetaminophen (Norco) 5-325 MG per tab 2 tablet, 2 tablet, Oral, Q4H PRN    ondansetron (Zofran) 4 MG/2ML injection 4 mg, 4 mg, Intravenous, Q6H PRN    Physical Exam:  Blood pressure 110/54, pulse 81, temperature 97.7 °F (36.5 °C), temperature source Oral, resp. rate 18, weight 80 lb 9.6 oz (36.6 kg), SpO2 94%.    General: Alert, orientated x3.  Cooperative.  No apparent distress.  HEENT: Exam is unremarkable.  Neck: Supple.  Lungs: Clear bilaterally.  Cardiac: Regular rate and rhythm.  Abdomen:  Bowel sounds present, normoactive.  Nontender.  Extremities:  No lower extremity edema noted.  Skin: Normal texture and turgor.  Lymphatic:  No cervical lymphadenopathy.    Impression and Plan:  Patient Active Problem List   Diagnosis    Routine medical exam    Protein-calorie malnutrition, unspecified severity ([facility])    Cellulitis    Compression fracture of T11 vertebra with routine healing    Moderate late onset Alzheimer's dementia without behavioral disturbance, psychotic disturbance, mood disturbance, or anxiety ([facility])    Closed fracture of left hip, initial encounter ([facility])    Palliative care by specialist    Right leg pain    Dehydration    Neurologic gait dysfunction    Pneumonia of right lower lobe due to infectious organism    At moderate risk for fall    Weakness generalized       Dysphagia with aspiration  Failure to thrive    Discussed options, reviewed with the patient's  daughter-PEG tube placement was were discussed.  The risks and benefits were reviewed.  Risks of cardiopulmonary decompensation, infection, bleeding, damage of other internal organs were reviewed.  All questions were answered.  They would be interested in proceeding.    I would like to have the patient treated for 24 hours for her respiratory/aspiration plus management of C. difficile.  Will plan to set this up in the next 24 to 48 hours for EGD plus PEG tube placement.    IV Flagyl's been started but will transition to oral Vanco once the patient is cleared by speech.    Vadim Nunes MD  6/3/2024  1:47 PM

## 2024-06-03 NOTE — CM/SW NOTE
06/03/24 1500   CM/SW Referral Data   Referral Source Social Work (self-referral)   Reason for Referral Discharge planning   Informant EMR;Clinical Staff Member   Medical Hx   Does patient have an established PCP? Yes  (Dr. Igor Field)   Significant Past Medical/Mental Health Hx Alzheimers Dementia   Patient Info   Advanced directives? Yes   Patient's Current Mental Status at Time of Assessment Confused or unable to complete assessment   Patient's Home Environment House   Number of Levels in Home   (Able to live on main level)   Patient lives with Daughter;Other  (Part-time CG)   Patient Status Prior to Admission   Independent with ADLs and Mobility No   Pt. requires assistance with Housework;Driving;Meals;Bathing;Ambulating;Dressing;Medications;Feeding;Toileting;Finances   Services in place prior to admission Home Health Care;DME/Supplies at home;detention Home Care   Home Health Provider Info [facility]   Type of DME/Supplies Wheeled Walker;Wheelchair   detention Care hours per day 7   detention Care days per week 6   Discharge Needs   Anticipated D/C needs Subacute rehab;Home health care   Services Requested   Submitted to [facility] Yes     SW self-referred to this case for discharge planning.    Pt admitted under observation for weakness/failure to thrive.  Pt had very poor oral intake at home.    SW performed EMR review/spoke to HERNAN Patel regarding above.    Pt lives at home w/her daughter.  Pt has a part-time private duty caregiver that helps pt with ADLs  6 days a week for 7 hours.  Pt has been wheelchair bound post hip fracture per chart.    Pt is current with [facility].  SW confirmed with liaison Jairon that pt is current with RN only.  ROHIT entered.    Pt demonstrates limited rehab potential.    Per Dr. Nunes, plan is for PEG tube placement following treatment of aspiration pneumonia.  Dtr Daniela in agreement.    Pt may meet inpatient criteria.  SW submitted to [facility] w/anticipation pt may need [facility]  due to new PEG.  SW has not spoken to dtr regarding [facility] due to OBS status at this time.    PLAN: DC home w/[facility] and 24/7 support vs. [facility] pending inpatient status/[facility]/list/choice    / to remain available for support and/or discharge planning.     Deysi Mendoza MSW, LSW [id number]

## 2024-06-03 NOTE — DIETARY NOTE
ADULT NUTRITION INITIAL ASSESSMENT      RECOMMENDATIONS TO MD: See Nutrition Intervention     Pt is at high nutrition risk.  Pt meets severe malnutrition criteria.      CRITERIA FOR MALNUTRITION DIAGNOSIS: Criteria for severe malnutrition diagnosis: chronic illness related to wt loss greater than 5% in 1 month, energy intake less than 75% for greater than 1 month, body fat severe depletion, and muscle mass severe depletion.     ADMITTING DIAGNOSIS:  Weakness generalized [R53.1]     PERTINENT PAST MEDICAL HISTORY:    Past Medical History:    Moderate late onset Alzheimer's dementia without behavioral disturbance, psychotic disturbance, mood disturbance, or anxiety ([facility])    has a past surgical history that includes Umbilical hernia repair (1988); tonsillectomy; and Fracture surgery.     PATIENT STATUS: Initial 06/03/24: Patient (pt) identified at Nutrition risk due to poor po and unintentional wt loss after the screening process.  Received MD consult for Poor po intake. Presented with decreased appetite, inability to eat for several weeks, came in to evaluate for PEG placement per family wishes.  Medical findings:  Failure to Thrive, anorexia, + Cdiff, ? Aspiration pneumonia.   Upon visit, Pt sitting up in chair, pt is poor historian but  family (DTR Daniela & grandson Keven) at bedside. Diet recently advanced on modified diet following swallow eval. Diet hx/eval:    Prolonged inadequate nutrition intake based on reported decreased po, barely eating meals x several weeks.   Wt eval:    11% or 10# severe wt loss in the past 3-4 weeks based on reported usual wt of 90# ( consistent with emr wt hx), compared to current wt of 80#. Nutrition findings:    Low BMI at 14.2 kg/m2. Met severe malnutrition. Palliative  Care consulted, eval noted, hence will await for GOC decisions vs PEG.  Agree with  current diet per SLP and need to initiate Oral Nutrition Supplement ( ONS ) . Discussed with RN to obtain phos level. Nutrition plan  discussed with family. See Nutrition Intervention for details.     FOOD/NUTRITION INTAKE ANALYSIS:    Current Appetite: poor  Current Intake:  diet recently advanced.   Current Intake Meeting Needs: No, but oral nutrition supplements (ONS) to maximize  Percent Meals Eaten (last 6 days)       None           Food Allergies: No Known Food Allergies (NKFA)  Cultural/Ethnic/Zoroastrianism Preferences: None    GASTROINTESTINAL: +BM 6/3 Loose brown med stool x 1, +C diff, and Swallow evaluation noted     MEDICATIONS: reviewed    sodium chloride 100 mL/hr at 06/03/24 0321      ampicillin-sulbactam  3 g Intravenous q6h    vancomycin  125 mg Oral QID    melatonin  3 mg Oral Nightly    heparin  5,000 Units Subcutaneous Q8H ANGELA       LABS: reviewed K replacement given.   Recent Labs     06/02/24  1532 06/03/24  0542   GLU 94 77   BUN 21 18   CREATSERUM 0.64 0.53*   CA 7.9* 7.6*   MG  --  1.9    135*   K 3.6 3.4*    102   CO2 30.0 26.0   PHOS  --  3.5   OSMOCALC 287 281       NUTRITION RELATED PHYSICAL FINDINGS:  - Nutrition Focused Physical Exam (NFPE): severe depletion body fat orbital region and triceps region and severe depletion muscle mass temple region, clavicle region, scapular region, shoulder region, dorsal hutchins region, thigh region, and calf region   - Fluid Accumulation: none  see RN documentation for details  - Skin Integrity: Pressure Injury: Stage stage 1 on right heel  see RN documentation for details    ANTHROPOMETRICS:  HT:  5'3\"  WT: 36.6 kg (80 lb 9.6 oz)   BMI: Body mass index is 14.28 kg/m².  BMI CLASSIFICATION: less than 19 kg/m2 - underweight  IBW: 115 lbs        70% IBW  Usual Body Wt: 90 lbs per DTR      89% UBW  WEIGHT HISTORY:    Patient Weight(s) for the past 336 hrs:   Weight   06/02/24 1719 36.6 kg (80 lb 9.6 oz)   06/02/24 1712 36.6 kg (80 lb 9.6 oz)     Wt Readings from Last 10 Encounters:   06/02/24 36.6 kg (80 lb 9.6 oz)   12/28/23 42.5 kg (93 lb 12.8 oz)   01/05/22 45.7 kg (100 lb  12.8 oz)   11/18/21 49 kg (108 lb)   09/10/21 46.4 kg (102 lb 6.4 oz)   05/19/21 46.4 kg (102 lb 3.2 oz)   06/04/19 49.4 kg (109 lb)   05/23/19 50.1 kg (110 lb 6.4 oz)   05/15/19 50.3 kg (111 lb)   03/18/19 47.2 kg (104 lb)       NUTRITION DIAGNOSIS/PROBLEM:    Severe Malnutrition related to Chronic - Physiological causes resulting in anorexia or diminished intake in the setting of Alzheimer's dementia as evidenced by DTR reported decreased po intake (barely eating) x several weeks, with 11% severe wt loss in the past 2-4 weeks, with severe Muscle mass loss and Adipose tissue depletion consistent with Low BMI of 14.2 kg/m2.     NUTRITION INTERVENTION:     NUTRITION PRESCRIPTION:   Estimated Nutrition needs: Dosing wt of 36.6 kg --wt taken on 6/2 .  Calories: 1307-5891 calories/day (35-40 calories per kg Dosing wt)  Protein: 55-66 g protein/day (1.5-1.8 g protein/kg  Dosing wt)    - Diet:       Procedures    Regular/General diet Fluid Consistency: Thin Liquids ; Texture Consistency: Pureed; Is Patient on Accuchecks? No; Is Patient on Suicide Precautions? No    - RD Malnutrition Care Plan: Encouraged increased PO intake, Initiated ONS (oral nutritional supplements), Requested  with ordering meals, tray set up and/or feeding as needed, Ordered multivitamin, and Ordered thiamin  - Medical Food Supplements- Ensure Compact (220 calories/ 9 g protein each) BID Vanilla or Chocolate and Magic Cup (290 calories/ 9 g protein each) BID Vanilla or Chocolate--added by RD,  Rational/Benefits discussed.  - Vitamin and mineral supplements: multivitamin/mineral and thiamin/ RD  - Feeding assistance: meal set up and feed  - Nutrition education: Discussed importance of adequate energy and protein intake   - Coordination of nutrition care: collaboration with other providers  - Discharge and transfer of nutrition care to new setting or provider: monitor plans      MONITOR AND EVALUATE/NUTRITION GOALS:  - Food and Nutrient  Intake:    Monitor: adequacy of PO intake and adequacy of supplement intake  - Food and Nutrient Administration:    Monitor: goc/family wishes regarding nutrition  - Anthropometric Measurement:    Monitor weight  - Nutrition Goals:    halt wt loss, gradual wt gain as able, PO and supplement greater than 75% of needs, labs within acceptable limits, minimize lean body mass loss, prevent skin breakdown, and improved GI status      RD will follow up.    Juliana Sorensen RD, LDN, [facility]  Clinical Dietitian  [phone]

## 2024-06-03 NOTE — PLAN OF CARE
Problem: Patient Centered Care  Goal: Patient preferences are identified and integrated in the patient's plan of care  Description: Interventions:  - What would you like us to know as we care for you? Home with daughter  - Provide timely, complete, and accurate information to patient/family  - Incorporate patient and family knowledge, values, beliefs, and cultural backgrounds into the planning and delivery of care  - Encourage patient/family to participate in care and decision-making at the level they choose  - Honor patient and family perspectives and choices  Outcome: Progressing     Problem: Patient/Family Goals  Goal: Patient/Family Long Term Goal  Description: Patient's Long Term Goal: stable    Interventions:  - Follow healthcare team, treatment plan  -Monitor labs, Vitals & dianostics test  -Pain management.  - Diet recommendation.   -Partipate in therapy.  -Discharge planning    - See additional Care Plan goals for specific interventions  Outcome: Progressing  Goal: Patient/Family Short Term Goal  Description: Patient's Short Term Goal: Increase diet intake    Interventions:   - Monitor labs, vitals and diagnostic tests.  -Pain management, pharmacological interventions  -Diet recommendations  -Adequate oral intake     - See additional Care Plan goals for specific interventions  Outcome: Progressing     Problem: GASTROINTESTINAL - ADULT  Goal: Maintains adequate nutritional intake (undernourished)  Description: INTERVENTIONS:  - Monitor percentage of each meal consumed  - Identify factors contributing to decreased intake, treat as appropriate  - Assist with meals as needed  - Monitor I&O, WT and lab values  - Obtain nutritional consult as needed  - Optimize oral hygiene and moisture  - Encourage food from home; allow for food preferences  - Enhance eating environment  Outcome: Progressing     Problem: SKIN/TISSUE INTEGRITY - ADULT  Goal: Skin integrity remains intact  Description: INTERVENTIONS  - Assess  and document risk factors for pressure ulcer development  - Assess and document skin integrity  - Monitor for areas of redness and/or skin breakdown  - Initiate interventions, skin care algorithm/standards of care as needed  Outcome: Progressing  Goal: Incision(s), wounds(s) or drain site(s) healing without S/S of infection  Description: INTERVENTIONS:  - Assess and document risk factors for pressure ulcer development  - Assess and document skin integrity  - Assess and document dressing/incision, wound bed, drain sites and surrounding tissue  - Implement wound care per orders  - Initiate isolation precautions as appropriate  - Initiate Pressure Ulcer prevention bundle as indicated  Outcome: Progressing     Problem: MUSCULOSKELETAL - ADULT  Goal: Return mobility to safest level of function  Description: INTERVENTIONS:  - Assess patient stability and activity tolerance for standing, transferring and ambulating w/ or w/o assistive devices  - Assist with transfers and ambulation using safe patient handling equipment as needed  - Ensure adequate protection for wounds/incisions during mobilization  - Obtain PT/OT consults as needed  - Advance activity as appropriate  - Communicate ordered activity level and limitations with patient/family  Outcome: Progressing     Problem: Impaired Activities of Daily Living  Goal: Achieve highest/safest level of independence in self care  Description: Interventions:  - Assess ability and encourage patient to participate in ADLs to maximize function  - Promote sitting position while performing ADLs such as feeding, grooming, and bathing  - Educate and encourage patient/family in tolerated functional activity level and precautions during self-care  - Provide support under elbow of weak side to prevent shoulder subluxation  Outcome: Progressing     Problem: Impaired Swallowing  Goal: Minimize aspiration risk  Description: Interventions:  - Patient should be alert and upright for all  feedings (90 degrees preferred)  - Offer food and liquids at a slow rate  - No straws  - Encourage small bites of food and small sips of liquid  - Offer pills one at a time, crush or deliver with applesauce as needed  - Discontinue feeding and notify MD (or speech pathologist) if coughing or persistent throat clearing or wet/gurgly vocal quality is noted  Outcome: Progressing

## 2024-06-03 NOTE — PLAN OF CARE
Problem: GASTROINTESTINAL - ADULT  Goal: Maintains adequate nutritional intake (undernourished)  Description: INTERVENTIONS:  - Monitor percentage of each meal consumed  - Identify factors contributing to decreased intake, treat as appropriate  - Assist with meals as needed  - Monitor I&O, WT and lab values  - Obtain nutritional consult as needed  - Optimize oral hygiene and moisture  - Encourage food from home; allow for food preferences  - Enhance eating environment  Outcome: Progressing     Problem: SKIN/TISSUE INTEGRITY - ADULT  Goal: Skin integrity remains intact  Description: INTERVENTIONS  - Assess and document risk factors for pressure ulcer development  - Assess and document skin integrity  - Monitor for areas of redness and/or skin breakdown  - Initiate interventions, skin care algorithm/standards of care as needed  Outcome: Progressing     Problem: MUSCULOSKELETAL - ADULT  Goal: Return mobility to safest level of function  Description: INTERVENTIONS:  - Assess patient stability and activity tolerance for standing, transferring and ambulating w/ or w/o assistive devices  - Assist with transfers and ambulation using safe patient handling equipment as needed  - Ensure adequate protection for wounds/incisions during mobilization  - Obtain PT/OT consults as needed  - Advance activity as appropriate  - Communicate ordered activity level and limitations with patient/family  Outcome: Progressing

## 2024-06-03 NOTE — CONSULTS
Spiritual Care Visit Note    Patient Name: Joy Jenkins Date of Spiritual Care Visit: 24   : 1931 Primary Dx: Weakness generalized       Referred By: Referral From: Nurse    Spiritual Care Taxonomy:    Intended Effects: Aligning care plan with patient's values    Methods: Collaborate with care team member;Offer support    Interventions: Ask guided questions;Active listening;Assist someone with Advance Directives;Silent prayer    Visit Type/Summary:     - PoA: Identified Existing PoAH - No Updates Needed: Writer report consulting with RN. HPoA in Epic is current. No need at this time. No family at bedside.    Spiritual Care support can be requested via an [facility] consult. For urgent/immediate needs, please contact the On Call  at: [place]: ext [phone]

## 2024-06-03 NOTE — PLAN OF CARE
Problem: ALTERED NUTRIENT INTAKE - ADULT  Goal: Nutrient intake appropriate for improving, restoring or maintaining nutritional needs  Description: INTERVENTIONS:  - Assess nutritional status and recommend course of action  - Monitor oral intake, labs, and treatment plans  - Recommend appropriate diets, oral nutritional supplements, and vitamin/mineral supplements  - Recommend, monitor, and adjust tube feedings  based on assessed needs  - Provide specific nutrition education as appropriate  Outcome: Not Progressing

## 2024-06-03 NOTE — SLP NOTE
ADULT SWALLOWING EVALUATION    ASSESSMENT    ASSESSMENT/OVERALL IMPRESSION:  Pt was seen for a clinical bedside swallowing exam, which is suggestive of mild oral dysphagia and possible pharyngeal dysfunction.  Pt was seated on a standard chair and alert. Followed simple verbal commands inconsistently. Family is by bedside. Pt's participation in an oral motor assessment was limited.  Trial swallows were performed with thin liquids (via straw and cup), nectar thick liquids and pureed solids. Oral stage was charectrized by pt holding boluses in the mouth for about 4-5 seconds prior to posterior propulsion and subsequent pharyngeal initiation. Mild oral residue was noted. Hyolaryngeal excursion was perceived to be mildly reduced. Pt produced a delayed cough for thin liquids presented with straw. No overt s/s of laryngeal penetration or aspiration for thin liquids via cup or any other consistency tested.   Respiration was stable and voice was clear (although weak) throughout the session.     Recommendation:  Diet: Thin liquids (ONLY via cup) and pureed solids.  Plan of care: SLP to f/u for diet tolerance. Nursing team and SLP to closely monitor for overt s/s of aspiration and/or changes in Chest Xray.        RECOMMENDATIONS   Diet Recommendations - Solids: Puree  Diet Recommendations - Liquids: Thin Liquids                        Compensatory Strategies Recommended: No straws;Slow rate;Small bites and sips  Aspiration Precautions: Upright position;Slow rate;Small bites and sips;No straw;Cueing to swallow  Medication Administration Recommendations: Crushed in puree  Treatment Plan/Recommendations: Aspiration precautions    HISTORY   MEDICAL HISTORY  Reason for Referral: R/O aspiration    Problem List  Principal Problem:    Weakness generalized      Past Medical History  Past Medical History:    Moderate late onset Alzheimer's dementia without behavioral disturbance, psychotic disturbance, mood disturbance, or anxiety  (HCC)       Prior Living Situation: Home with support  Diet Prior to Admission: Soft/ Easy to Chew;Thin liquids (softer foods because of dentition status per family)  Precautions: Aspiration    Patient/Family Goals: Start eating    SWALLOWING HISTORY  Current Diet Consistency: NPO  Dysphagia History:     BSE - 12/23/23: Regular solids and thin liquid rec,  Downgraded to puree and nectar thick liquids on 12/26/23.    Imaging Results:     Chest Xray:    Patchy right basilar airspace opacity, concerning for aspiration/pneumonia.      Trace right pleural effusion.      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     SUBJECTIVE       OBJECTIVE   ORAL MOTOR EXAMINATION  Dentition: Lower dentures (missing upper teeth)  Symmetry: Reduced right facial;Reduced left facial  Strength: Reduced right facial;Reduced left facial  Tone: Reduced right facial;Reduced left facial  Range of Motion: Unable to assess  Rate of Motion: Unable to assess    Voice Quality: Weak  Respiratory Status: Unlabored  Consistencies Trialed: Thin liquids;Puree;Nectar thick liquids  Method of Presentation: Staff/Clinician assistance;Spoon;Cup;Straw;Single sips  Patient Positioning: Upright;Standard chair    Oral Phase of Swallow: Impaired  Bolus Retrieval: Intact  Bilabial Seal: Intact  Bolus Formation: Impaired  Bolus Propulsion: Impaired  Mastication: Impaired  Retention: Impaired    Pharyngeal Phase of Swallow: Impaired  Laryngeal Elevation Timing: Appears impaired  Laryngeal Elevation Strength: Appears impaired  Laryngeal Elevation Coordination: Appears impaired  (Please note: Silent aspiration cannot be evaluated clinically. Videofluoroscopic Swallow Study is required to rule-out silent aspiration.)    Esophageal Phase of Swallow: No complaints consistent with possible esophageal involvement  Comments: Start eating              GOALS  Goal #1 The patient will tolerate  puree consistency and thin liquids without overt signs or symptoms of aspiration with 90 % accuracy over 3 session(s).  In Progress   Goal #2 The patient/family/caregiver will demonstrate understanding and implementation of aspiration precautions and swallow strategies independently over 3 session(s).    In Progress   Goal #3 The patient will tolerate trial upgrade of soft easy to chew solid consistency and thin liquids without overt signs or symptoms of aspiration with 90 % accuracy over 3 session(s).  In Progress     FOLLOW UP  Treatment Plan/Recommendations: Aspiration precautions  Number of Visits to Meet Established Goals: 4  Follow Up Needed (Documentation Required): Yes  SLP Follow-up Date: 06/04/24    Thank you for your referral.   If you have any questions, please contact Bunny Bryan, MAAME Bryan, Ph.D., [facility]3]-SLP  Speech-Language Pathologist  Phone number Ext.: [phone]

## 2024-06-03 NOTE — PHYSICAL THERAPY NOTE
PHYSICAL THERAPY EVALUATION - INPATIENT     Room Number: 546/546-A  Evaluation Date: 6/3/2024  Type of Evaluation: Initial   Physician Order: PT Eval and Treat    Presenting Problem: Failure to thrive, generalized weakness, poor appetite, R hip fx 2/2 fall at home ~2 weeks ago  Co-Morbidities : Alzheimer's dementia, fall causing hip fx S/P L hip IMN 12/2023  Reason for Therapy: Mobility Dysfunction and Discharge Planning    PHYSICAL THERAPY ASSESSMENT   Patient is a 89 year old female admitted 6/2/2024 for failure to thrive, poor appetite.  Prior to admission, patient's baseline is supervision level using a rolling walker, fall ~2 weeks ago when attempting to get up unsupervised causing R hip fx, daughter reporting no weight-bearing restrictions deemed necessary, since fall pt has been wheelchair-bound.  Patient is currently functioning below baseline with bed mobility, transfers, and gait.  Patient is requiring dependent assistance (modAx2) as a result of the following impairments: decreased functional strength, pain, impaired standing balance, cognitive deficits (dementia), and limited RLE ROM.  Physical Therapy will continue to follow for duration of hospitalization.    Patient will benefit from continued skilled PT Services at discharge to promote functional independence and safety with additional support and return home with home health PT, pt's family supportive and reporting pt is functioning similarly to how she has been the past 2 weeks but gradually improving, agreeable to getting home-health PT to assist with recovery.    PLAN  PT Treatment Plan: Bed mobility;Body mechanics;Endurance;Energy conservation;Patient education;Family education;Gait training;Range of motion;Strengthening;Transfer training;Balance training  Rehab Potential : Guarded  Frequency (Obs): 3-5x/week    PHYSICAL THERAPY MEDICAL/SOCIAL HISTORY     Problem List  Principal Problem:    Weakness generalized      HOME SITUATION  Home  Situation  Type of Home: House  Home Layout: Multi-level;Able to live on main level  Stairs to Enter : 0  Lives With: Daughter;Family;Caregiver part-time (caregiver 6 days/week, 7 hrs/day)  Drives: No  Patient Owned Equipment: Rolling walker;Wheelchair     Prior Level of Worth: supervision level using a rolling walker, fall ~2 weeks ago when attempting to get up unsupervised causing R hip fx, daughter reporting no weight-bearing restrictions deemed necessary, since fall pt has been wheelchair-bound    fall causing hip fx S/P L hip IMN 12/2023, was requiring maxAx2, D/C'ed home with home-health PT and home DME    SUBJECTIVE  \"Do you have any coffee?\"    PHYSICAL THERAPY EXAMINATION   OBJECTIVE  Precautions: Bed/chair alarm  Fall Risk: High fall risk    WEIGHT BEARING RESTRICTION  Weight Bearing Restriction: None (daughter reporting at-home x-ray revealed fx but no WB restrictions)                PAIN ASSESSMENT  Rating: Unable to rate  Location: R hip with repositioning and weightbearing  Management Techniques: Body mechanics;Activity promotion    COGNITION  Overall Cognitive Status:  WFL - within functional limits    RANGE OF MOTION AND STRENGTH ASSESSMENT  Upper extremity ROM and strength are within functional limits  BUEs  Lower extremity ROM is within functional limits  LLE, RLE not assessed 2/2 pain  Lower extremity strength is within functional limits  LLE, RLE limited 2/2 pain    BALANCE  Static Sitting: Fair  Dynamic Sitting: Fair -  Static Standing: Poor -  Dynamic Standing: Dependent    ACTIVITY TOLERANCE  Pulse: 81  Heart Rate Source: Monitor     BP: 110/54  BP Location: Right arm  BP Method: Automatic  Patient Position: Lying    O2 WALK  Oxygen Therapy  SPO2% on Room Air at Rest: 95    AM-PAC '6-Clicks' INPATIENT SHORT FORM - BASIC MOBILITY  How much difficulty does the patient currently have...  Patient Difficulty: Turning over in bed (including adjusting bedclothes, sheets and blankets)?: A Lot    Patient Difficulty: Sitting down on and standing up from a chair with arms (e.g., wheelchair, bedside commode, etc.): A Lot   Patient Difficulty: Moving from lying on back to sitting on the side of the bed?: A Lot   How much help from another person does the patient currently need...   Help from Another: Moving to and from a bed to a chair (including a wheelchair)?: A Lot   Help from Another: Need to walk in hospital room?: Total   Help from Another: Climbing 3-5 steps with a railing?: Total     AM-PAC Score:  Raw Score: 10   Approx Degree of Impairment: 76.75%   Standardized Score (AM-PAC Scale): 32.29   CMS Modifier (G-Code): CL    FUNCTIONAL ABILITY STATUS  Functional Mobility/Gait Assessment  Gait Assistance: Not tested (deferred 2/2 impaired standing balance/tolerance)  Rolling: moderate assist  Supine to Sit: moderate assist  Sit to Stand: maximum assist  Stand-pivot transfer bed>chair: maximum assistance    ADDITIONAL INFORMATION    Pt guarding RLE with hesitancy to place weight through RLE, unable to verbalize pain but wincing with RLE movement. Pt's daughter present, reporting family has been able to get her out of wheelchair, however unable to tolerate standing for ambulating around home for past 2 weeks, looking into having more/closer support while recovering.     Patient seen for evaluation in coordination with occupational therapy to maximize patient safety and function given history of dementia and recent falls. Patient received semi-fowlers in bed, agreeable to physical therapy evaluation. Vital signs monitored as noted above, no adverse symptoms and patient stable during session. Education with patient and patient's daughter provided verbally on physical therapy plan of care, physiological benefits of out of bed mobility, and fall prevention. Next session anticipate to progress bed mobility, transfers, and gait.    Patient history and/or personal factors that may impact the plan of care include  limited functional status at baseline, dementia, longstanding history of pain, and has history of recent hospitalization. Based on the physical therapy examination of the noted systems and functional activity/participation limitations, the patient presentation is unstable given the patient demonstrates worsening of previously stable condition, demonstrates worsening of co-morbidities, demonstrates cognitive skills affecting safety, and has history of frequent/recent falls.      Exercise/Education Provided:  Bed mobility  Body mechanics  Energy conservation  Functional activity tolerated  Transfer training    The patient's Approx Degree of Impairment: 76.75% has been calculated based on documentation in the [facility] '6 clicks' Inpatient Basic Mobility Short Form.  Research supports that patients with this level of impairment may benefit from long-term care.  Final disposition will be made by interdisciplinary medical team.    Patient End of Session: Up in chair;Needs met;Call light within reach;RN aware of session/findings;All patient questions and concerns addressed;Alarm set;Family present    CURRENT GOALS  Goals to be met by: 6/23/24  Patient Goal Patient's self-stated goal is: return home safely   Goal #1 Patient is able to demonstrate supine - sit EOB @ level: minimum assistance     Goal #1   Current Status    Goal #2 Patient is able to demonstrate transfers EOB to/from C at assistance level: minimum assistance with walker - rolling     Goal #2  Current Status    Goal #3 Patient is able to ambulate 30 feet with assist device: walker - rolling at assistance level: minimum assistance   Goal #3   Current Status    Goal #4 Patient will perform sit-to-stand transfer from bedside chair using rolling walker with minimum assistance   Goal #4   Current Status    Goal #5 Patient to demonstrate independence with home activity/exercise instructions provided to patient in preparation for discharge.   Goal #5   Current Status     Goal #6    Goal #6  Current Status      Patient Evaluation Complexity Level:  History Moderate - 1 or 2 personal factors and/or co-morbidities   Examination of body systems Moderate - addressing a total of 3 or more elements   Clinical Presentation  High - Unstable   Clinical Decision Making  Moderate Complexity       Therapeutic Activity:  15 minutes      Ligia Mitchell, PT, DPT  [facility]  Rehab Services - Physical Therapy  [id number]

## 2024-06-03 NOTE — OCCUPATIONAL THERAPY NOTE
OCCUPATIONAL THERAPY EVALUATION - INPATIENT     Room Number: 546/546-A  Evaluation Date: 6/3/2024  Type of Evaluation: Initial  Presenting Problem: generalized weakness, fall (daughter reports R hip fx and WBAT, declined f/u imaging this admission)  Hx Alzheimer's dementia    Physician Order: IP Consult to Occupational Therapy  Reason for Therapy: ADL/IADL Dysfunction and Discharge Planning    OCCUPATIONAL THERAPY ASSESSMENT   Patient is a 89 year old female admitted 6/2/2024 for generalized weakness, fall (daughter reports R hip fx and WBAT, declined f/u imaging this admission).  Patient's daughter reported patient receives assist with ADLs and is SUP for fx mobility using RW at baseline. 1-person assist for navigating stairs. However, patient has been unable to ambulate since fall ~2.5 weeks ago d/t pain.     Patient is currently functioning below baseline with ADLs and fx mobility/transfers.  Patient is requiring up to max assist for ADLs as a result of the following impairments: decreased functional strength, decreased functional reach, decreased endurance, pain, impaired balance, decreased muscular endurance, cognitive deficits, and decreased safety awareness. Occupational Therapy will continue to follow for duration of hospitalization.    Patient will benefit from continued skilled OT Services at discharge to promote functional independence and safety with additional support and return home with home health OT    PLAN  OT Treatment Plan: Balance activities;Energy conservation/work simplification techniques;ADL training;Functional transfer training;Endurance training;Patient/Family education;Patient/Family training;Equipment eval/education;Compensatory technique education  OT Device Recommendations: TBD    OCCUPATIONAL THERAPY MEDICAL/SOCIAL HISTORY   Problem List  Principal Problem:    Weakness generalized    HOME SITUATION  Type of Home: House  Home Layout: Multi-level; Able to live on main level  Lives With:  Caregiver part-time; Alone (6 days/week, 7 hrs/day; daughter and other family assist when CG not present)  Toilet and Equipment: 3-in-1 commode  Shower/Tub and Equipment: Shower chair  Other Equipment: -- (w/c, hospital bed)  Drives: No  Stairs in Home: 0 SHAGGY  Use of Assistive Device(s): RW    SUBJECTIVE  Patient agreeable to OT evaluation.    OCCUPATIONAL THERAPY EXAMINATION    OBJECTIVE  Precautions: Bed/chair alarm  Fall Risk: High fall risk    WEIGHT BEARING RESTRICTION  R Lower Extremity: Weight Bearing as Tolerated per patient's daughter    PAIN ASSESSMENT  Rating: -- (not rated)  Location: unable to localize  Management Techniques: Activity promotion; Body mechanics; Repositioning; Nurse notified    ACTIVITY TOLERANCE  Pulse: 81  Heart Rate Source: Monitor     BP: 110/54  BP Location: Right arm  BP Method: Automatic  Patient Position: Lying    O2 SATURATIONS  Oxygen Therapy  SPO2% on Room Air at Rest: 95    COGNITION  Following Commands:  follows one step commands with repetition    SENSATION  Light touch:  intact    RANGE OF MOTION   Upper extremity ROM is within functional limits     STRENGTH ASSESSMENT  Upper extremity strength is within functional limits     COORDINATION  Gross Motor: WFL   Fine Motor: WFL     ACTIVITIES OF DAILY LIVING ASSESSMENT  AM-PAC ‘6-Clicks’ Inpatient Daily Activity Short Form  How much help from another person does the patient currently need…  -   Putting on and taking off regular lower body clothing?: A Lot  -   Bathing (including washing, rinsing, drying)?: A Lot  -   Toileting, which includes using toilet, bedpan or urinal? : A Lot  -   Putting on and taking off regular upper body clothing?: A Little  -   Taking care of personal grooming such as brushing teeth?: A Little  -   Eating meals?: A Little    AM-PAC Score:  Score: 15  Approx Degree of Impairment: 56.46%  Standardized Score (AM-PAC Scale): 34.69  CMS Modifier (G-Code): CK    BED MOBILITY  Supine to Sit: Mod assist      FUNCTIONAL TRANSFER ASSESSMENT  Attempted Sit to Stand from EOB, however, patient unable to tolerate standing d/t pain  Stand Pivot Transfer from EOB to Chair: Max assist    FUNCTIONAL ADL ASSESSMENT  Eating: min assist (per obs)   Grooming: min assist (per obs)   UB Dressing: mod assist (per obs)   LB Dressing: max assist  Toileting: total assist (per obs)     EDUCATION PROVIDED  Patient: Role of Occupational Therapy; Plan of Care; Discharge Recommendations; Functional Transfer Techniques; Fall Prevention; Posture/Positioning; Proper Body Mechanics  Patient's Response to Education: Requires Further Education  Family/Caregiver: Role of Occupational Therapy; Plan of Care; Discharge Recommendations  Family/Caregiver's Response to Education: Verbalized Understanding    The patient's Approx Degree of Impairment: 56.46% has been calculated based on documentation in the [facility] '6 clicks' Inpatient Daily Activity Short Form.  Research supports that patients with this level of impairment may benefit from rehab.  Final disposition will be made by interdisciplinary medical team.     Patient End of Session: Up in chair;Needs met;Call light within reach;RN aware of session/findings;All patient questions and concerns addressed;Alarm set;Family present    OT Goals  Patients self stated goal is: none stated     Patient will complete functional transfer with CGA  Comment:     Patient will tolerate standing for 2-3 minutes in prep for adls with CGA  Comment:    Patient will complete item retrieval with CGA  Comment:          Goals  on: 24  Frequency: 3-5x/week    Patient Evaluation Complexity Level:   Occupational Profile/Medical History MODERATE - Expanded review of history including review of medical or therapy record   Specific performance deficits impacting engagement in ADL/IADL MODERATE  3 - 5 performance deficits   Client Assessment/Performance Deficits MODERATE - Comorbidities and min to mod modifications of  tasks    Clinical Decision Making MODERATE - Analysis of occupational profile, detailed assessments, several treatment options    Overall Complexity MODERATE     OT Session Time  Therapeutic Activity: 15 minutes    [employer]/L  [facility]  #[id number]

## 2024-06-03 NOTE — ED PROVIDER NOTES
Patient Seen in: [facility] Emergency Department    History     Chief Complaint   Patient presents with    Failure To Thrive       HPI    The patient presents to the ED with her family who states that she has has not had much to eat or drink over the past several weeks.  She fell 2 weeks ago and had a greater trochanter fracture of her right femur.  She was not brought to the hospital at this time.  Patient has been able to transfer per family after the fall but is no longer walking.  History of dementia.  No other complaints.    History reviewed.   Past Medical History:    Moderate late onset Alzheimer's dementia without behavioral disturbance, psychotic disturbance, mood disturbance, or anxiety (HCC)       History reviewed.   Past Surgical History:   Procedure Laterality Date    Fracture surgery      Tonsillectomy      Umbilical hernia repair  1988         Medications :  Medications Prior to Admission   Medication Sig Dispense Refill Last Dose    QUEtiapine 25 MG Oral Tab Take 0.5 tablets (12.5 mg total) by mouth 2 (two) times daily as needed (agitation). (Patient taking differently: Take 0.5 tablets (12.5 mg total) by mouth nightly.) 30 tablet 2 6/1/2024    melatonin 3 MG Oral Tab    6/1/2024        History reviewed. No pertinent family history.    Smoking Status:   Social History     Socioeconomic History    Marital status:    Tobacco Use    Smoking status: Former    Smokeless tobacco: Never   Vaping Use    Vaping status: Never Used   Substance and Sexual Activity    Alcohol use: Yes     Alcohol/week: 1.0 standard drink of alcohol     Types: 1 Glasses of wine per week    Drug use: No   Other Topics Concern    Pt has a pacemaker No    Pt has a defibrillator No    Reaction to local anesthetic No       Constitutional and vital signs reviewed.      Social History and Family History elements reviewed from today, pertinent positives to the presenting problem noted.    Physical Exam     ED Triage Vitals  [06/02/24 1525]   /55   Pulse 84   Resp 20   Temp 98.2 °F (36.8 °C)   Temp src Oral   SpO2 94 %   O2 Device None (Room air)       All measures to prevent infection transmission during my interaction with the patient were taken. Handwashing was performed prior to and after the exam.  Stethoscope and any equipment used during my examination was cleaned with super sani-cloth germicidal wipes following the exam.     Physical Exam  Vitals and nursing note reviewed.   Constitutional:       General: She is not in acute distress.     Appearance: She is well-developed.      Comments: Appears chronically ill and cachectic   HENT:      Head: Normocephalic and atraumatic.   Eyes:      General:         Right eye: No discharge.         Left eye: No discharge.      Conjunctiva/sclera: Conjunctivae normal.   Neck:      Trachea: No tracheal deviation.   Cardiovascular:      Rate and Rhythm: Normal rate.   Pulmonary:      Effort: Pulmonary effort is normal. No respiratory distress.      Breath sounds: No stridor.   Abdominal:      General: There is no distension.      Palpations: Abdomen is soft.      Tenderness: There is no abdominal tenderness.   Musculoskeletal:         General: No deformity.   Skin:     General: Skin is warm and dry.   Neurological:      Mental Status: She is alert.      Comments: Oriented x 1   Psychiatric:         Mood and Affect: Mood normal.         Behavior: Behavior normal.         ED Course        Labs Reviewed   COMP METABOLIC PANEL (14) - Abnormal; Notable for the following components:       Result Value    BUN/CREA Ratio 32.8 (*)     Calcium, Total 7.9 (*)     ALT 7 (*)     Bilirubin, Total 1.3 (*)     Total Protein 5.3 (*)     Albumin 2.9 (*)     All other components within normal limits   PROCALCITONIN - Abnormal; Notable for the following components:    Procalcitonin 0.18 (*)     All other components within normal limits   CBC W/ DIFFERENTIAL - Abnormal; Notable for the following components:     RBC 3.10 (*)     HGB 9.1 (*)     HCT 26.8 (*)     RDW-SD 48.2 (*)     RDW 15.2 (*)     Neutrophil Absolute Prelim 8.98 (*)     Neutrophil Absolute 8.98 (*)     Lymphocyte Absolute 0.74 (*)     All other components within normal limits   CBC WITH DIFFERENTIAL WITH PLATELET    Narrative:     The following orders were created for panel order CBC With Differential With Platelet.                  Procedure                               Abnormality         Status                                     ---------                               -----------         ------                                     CBC W/ DIFFERENTIAL[[id number]]          Abnormal            Final result                                                 Please view results for these tests on the individual orders.   RAINBOW DRAW LAVENDER   RAINBOW DRAW LIGHT GREEN   RAINBOW DRAW BLUE   C. DIFFICILE(TOXIGENIC)PCR       As Interpreted by me    Imaging Results Available and Reviewed while in ED: XR CHEST AP PORTABLE  (CPT=71045)    Result Date: 6/2/2024  CONCLUSION:  Patchy right basilar airspace opacity, concerning for aspiration/pneumonia.  Trace right pleural effusion.  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   Dictated by (CST): Radha Stone MD on 6/02/2024 at 4:19 PM     Finalized by (CST): Radha Stone MD on 6/02/2024 at 4:23 PM         ED Medications Administered:   Medications   sodium chloride 0.9% infusion (0 mL Intravenous Stopped 6/2/24 1715)   melatonin tab 3 mg (has no administration in time range)   QUEtiapine (SEROquel) tab 12.5 mg (has no administration in time range)   sodium chloride 0.9% infusion ( Intravenous New Bag 6/2/24 1745)   heparin (Porcine) 5000 UNIT/ML injection 5,000 Units (has no administration in time range)   acetaminophen (Tylenol Extra Strength) tab 500 mg (has no administration in time range)   acetaminophen (Tylenol) tab 650 mg  (has no administration in time range)     Or   HYDROcodone-acetaminophen (Norco) 5-325 MG per tab 1 tablet (has no administration in time range)     Or   HYDROcodone-acetaminophen (Norco) 5-325 MG per tab 2 tablet (has no administration in time range)   ondansetron (Zofran) 4 MG/2ML injection 4 mg (has no administration in time range)         MDM     Vitals:    06/02/24 1712 06/02/24 1716 06/02/24 1719 06/02/24 2043   BP:  105/60  111/58   BP Location:  Left arm  Right arm   Pulse:  83  79   Resp:  18  18   Temp:  97.9 °F (36.6 °C)  98.3 °F (36.8 °C)   TempSrc:  Oral  Oral   SpO2:  94%  92%   Weight: 36.6 kg  36.6 kg      *I personally reviewed and interpreted all ED vitals.    Pulse Ox: 92%, Room air, Normal     Monitor Interpretation:   normal sinus rhythm as interpreted by me.  The cardiac monitor was ordered to monitor heart rate.    Differential Diagnosis/ Diagnostic Considerations: Failure to thrive, greater trochanter hip fracture, dehydration, KRISTY, other    Complicating Factors: The patient already has does not have any pertinent problems on file. to contribute to the complexity of this ED evaluation.    Medical Decision Making  The patient presents to the ED for failure to thrive symptoms after recent fall and reported greater trochanter fracture of the right hip per family.  Family requesting a G-tube placement as the patient not eating.  I had a long discussion with the patient's daughter recommending against this as the patient is 89 years old and declining.  Family insisted on full code, admission for GI evaluation.  I discussed with Dr. Jones for admission and Dr. Acosta for GI consultation.    Problems Addressed:  Weakness generalized: chronic illness or injury with exacerbation, progression, or side effects of treatment that poses a threat to life or bodily functions    Amount and/or Complexity of Data Reviewed  Independent Historian:      Details: Patient's daughter provides history  details  Labs: ordered. Decision-making details documented in ED Course.  Discussion of management or test interpretation with external provider(s):  I discussed with Dr. Jones for admission and Dr. Acosta for GI consultation.          Condition upon leaving the department: Stable    Disposition and Plan     Clinical Impression:  1. Weakness generalized        Disposition:  Admit    Follow-up:  No follow-up provider specified.    Medications Prescribed:  Current Discharge Medication List          Hospital Problems       Present on Admission  Date Reviewed: 5/17/2024            ICD-10-CM Noted POA    * (Principal) Weakness generalized R53.1 6/2/2024 Unknown

## 2024-06-03 NOTE — PROGRESS NOTES
[facility]  part of [facility]     Hospitalist Progress Note     Joy Jenkins Patient Status:  Observation    1931 MRN [id number]   Location [facility] 5SW/SE Attending Sherri Overton MD   Hosp Day # 0 PCP Igor Field MD     Subjective:     Patient resting comfortably in bed. No acute overnight events reported by the nursing staff.   All questions and concerns addressed.      Objective:    Review of Systems:   ROS completed; pertinent positive and negatives stated in subjective.      Vital signs:  Temp:  [97.7 °F (36.5 °C)-98.3 °F (36.8 °C)] 97.7 °F (36.5 °C)  Pulse:  [79-86] 80  Resp:  [18-20] 18  BP: ()/(55-71) 110/62  SpO2:  [92 %-95 %] 94 %      Physical Exam:    Gen: NAD AO, frail female  Chest: good air entry CTABL  CVS: normal s1 and s2 RR  Abd: NABS soft NT ND  Neuro: CN 2-12 grossly intact  Ext: no edema in bilateral LE      Diagnostic Data:    Labs:  Recent Labs   Lab 24  1532 24  0542   WBC 10.6 9.3   HGB 9.1* 8.4*   MCV 86.5 86.7   .0 316.0   INR  --  1.12       Recent Labs   Lab 24  1532 24  0542   GLU 94 77   BUN 21 18   CREATSERUM 0.64 0.53*   CA 7.9* 7.6*   ALB 2.9*  --     135*   K 3.6 3.4*    102   CO2 30.0 26.0   ALKPHO 118  --    AST 13  --    ALT 7*  --    BILT 1.3*  --    TP 5.3*  --        Estimated Creatinine Clearance: 38.3 mL/min (A) (based on SCr of 0.53 mg/dL (L)).    Recent Labs   Lab 24  0542   PTP 15.1*   INR 1.12              Imaging: Imaging data reviewed in Epic.    Medications:    potassium chloride  40 mEq Intravenous Once    melatonin  3 mg Oral Nightly    heparin  5,000 Units Subcutaneous Q8H [facility]       Assessment & Plan:     Failure to thrive  Anorexia  Hx of dementia  S/p fall with recent hip fracture  Family wants PEG tube insertion for nutrition  Palliative care on consult  Fall precautions  PT/OT  PRN pain control  GI on consult - appreciate recs  ?aspiration  CXR with patchy right  basilar airspace opacity  NPO until cleared by SLP  Starting on IV Unasyn x 5 days  Bcx pending  C Diff positive  Started on PO Vancomycin      Plan of care discussed with patient or family at bedside.      Supplementary Documentation:     Quality:  DVT Prophylaxis: heparin s/c  CODE status: Full      Estimated date of discharge: TBD  Discharge is dependent on: clinical stability  At this point Ms. Jenkins is expected to be discharge to: home                  Sherri Overton MD  Hospitalist    MDM: High, I personally reviewed the available laboratories, imaging including RN. I discussed the case with AM labs. I ordered laboratories, studies including XR.    Medical decision making high, risk is high.       The 21st Century Cures Act makes medical notes like these available to patients in the interest of transparency. Please be advised this is a medical document. Medical documents are intended to carry relevant information, facts as evident, and the clinical opinion of the practitioner. The medical note is intended as peer to peer communication and may appear blunt or direct. It is written in medical language and may contain abbreviations or verbiage that are unfamiliar.

## 2024-06-03 NOTE — PLAN OF CARE
Strict npo until speech evaluation.  Problem: SKIN/TISSUE INTEGRITY - ADULT  Goal: Skin integrity remains intact  Description: INTERVENTIONS  - Assess and document risk factors for pressure ulcer development  - Assess and document skin integrity  - Monitor for areas of redness and/or skin breakdown  - Initiate interventions, skin care algorithm/standards of care as needed  Outcome: Progressing  Goal: Incision(s), wounds(s) or drain site(s) healing without S/S of infection  Description: INTERVENTIONS:  - Assess and document risk factors for pressure ulcer development  - Assess and document skin integrity  - Assess and document dressing/incision, wound bed, drain sites and surrounding tissue  - Implement wound care per orders  - Initiate isolation precautions as appropriate  - Initiate Pressure Ulcer prevention bundle as indicated  Outcome: Progressing     Problem: GASTROINTESTINAL - ADULT  Goal: Maintains adequate nutritional intake (undernourished)  Description: INTERVENTIONS:  - Monitor percentage of each meal consumed  - Identify factors contributing to decreased intake, treat as appropriate  - Assist with meals as needed  - Monitor I&O, WT and lab values  - Obtain nutritional consult as needed  - Optimize oral hygiene and moisture  - Encourage food from home; allow for food preferences  - Enhance eating environment  Outcome: Not Progressing     Problem: MUSCULOSKELETAL - ADULT  Goal: Return mobility to safest level of function  Description: INTERVENTIONS:  - Assess patient stability and activity tolerance for standing, transferring and ambulating w/ or w/o assistive devices  - Assist with transfers and ambulation using safe patient handling equipment as needed  - Ensure adequate protection for wounds/incisions during mobilization  - Obtain PT/OT consults as needed  - Advance activity as appropriate  - Communicate ordered activity level and limitations with patient/family  Outcome: Not Progressing     Problem:  Impaired Activities of Daily Living  Goal: Achieve highest/safest level of independence in self care  Description: Interventions:  - Assess ability and encourage patient to participate in ADLs to maximize function  - Promote sitting position while performing ADLs such as feeding, grooming, and bathing  - Educate and encourage patient/family in tolerated functional activity level and precautions during self-care  - Encourage patient to incorporate impaired side during daily activities to promote function    Outcome: Not Progressing     Problem: Impaired Swallowing  Goal: Minimize aspiration risk  Description: Interventions:  - Patient should be alert and upright for all feedings (90 degrees preferred)  - Offer food and liquids at a slow rate  - No straws  - Encourage small bites of food and small sips of liquid  - Offer pills one at a time, crush or deliver with applesauce as needed  - Discontinue feeding and notify MD (or speech pathologist) if coughing or persistent throat clearing or wet/gurgly vocal quality is noted  Outcome: Not Progressing

## 2024-06-04 ENCOUNTER — MED REC SCAN ONLY (OUTPATIENT)
Dept: PHYSICAL MEDICINE AND REHAB | Facility: CLINIC | Age: 89
End: 2024-06-04

## 2024-06-04 LAB
ALBUMIN SERPL-MCNC: 2.5 G/DL (ref 3.2–4.8)
ALBUMIN/GLOB SERPL: 1 {RATIO} (ref 1–2)
ALP LIVER SERPL-CCNC: 113 U/L
ALT SERPL-CCNC: 8 U/L
ANION GAP SERPL CALC-SCNC: 7 MMOL/L (ref 0–18)
AST SERPL-CCNC: 16 U/L (ref ?–34)
BASOPHILS # BLD AUTO: 0.03 X10(3) UL (ref 0–0.2)
BASOPHILS NFR BLD AUTO: 0.3 %
BILIRUB SERPL-MCNC: 1.1 MG/DL (ref 0.2–0.9)
BUN BLD-MCNC: 14 MG/DL (ref 9–23)
BUN/CREAT SERPL: 25 (ref 10–20)
CALCIUM BLD-MCNC: 7.8 MG/DL (ref 8.7–10.4)
CHLORIDE SERPL-SCNC: 108 MMOL/L (ref 98–112)
CO2 SERPL-SCNC: 22 MMOL/L (ref 21–32)
CREAT BLD-MCNC: 0.56 MG/DL
DEPRECATED RDW RBC AUTO: 53.2 FL (ref 35.1–46.3)
EGFRCR SERPLBLD CKD-EPI 2021: 85 ML/MIN/1.73M2 (ref 60–?)
EOSINOPHIL # BLD AUTO: 0.07 X10(3) UL (ref 0–0.7)
EOSINOPHIL NFR BLD AUTO: 0.8 %
ERYTHROCYTE [DISTWIDTH] IN BLOOD BY AUTOMATED COUNT: 15.4 % (ref 11–15)
GLOBULIN PLAS-MCNC: 2.6 G/DL (ref 2–3.5)
GLUCOSE BLD-MCNC: 86 MG/DL (ref 70–99)
HCT VFR BLD AUTO: 29.4 %
HGB BLD-MCNC: 8.8 G/DL
IMM GRANULOCYTES # BLD AUTO: 0.09 X10(3) UL (ref 0–1)
IMM GRANULOCYTES NFR BLD: 1 %
LYMPHOCYTES # BLD AUTO: 0.77 X10(3) UL (ref 1–4)
LYMPHOCYTES NFR BLD AUTO: 8.5 %
MCH RBC QN AUTO: 28.1 PG (ref 26–34)
MCHC RBC AUTO-ENTMCNC: 29.9 G/DL (ref 31–37)
MCV RBC AUTO: 93.9 FL
MONOCYTES # BLD AUTO: 0.81 X10(3) UL (ref 0.1–1)
MONOCYTES NFR BLD AUTO: 8.9 %
NEUTROPHILS # BLD AUTO: 7.29 X10 (3) UL (ref 1.5–7.7)
NEUTROPHILS # BLD AUTO: 7.29 X10(3) UL (ref 1.5–7.7)
NEUTROPHILS NFR BLD AUTO: 80.5 %
OSMOLALITY SERPL CALC.SUM OF ELEC: 284 MOSM/KG (ref 275–295)
PLATELET # BLD AUTO: 279 10(3)UL (ref 150–450)
POTASSIUM SERPL-SCNC: 3.8 MMOL/L (ref 3.5–5.1)
POTASSIUM SERPL-SCNC: 3.8 MMOL/L (ref 3.5–5.1)
PROT SERPL-MCNC: 5.1 G/DL (ref 5.7–8.2)
RBC # BLD AUTO: 3.13 X10(6)UL
SODIUM SERPL-SCNC: 137 MMOL/L (ref 136–145)
WBC # BLD AUTO: 9.1 X10(3) UL (ref 4–11)

## 2024-06-04 PROCEDURE — 99232 SBSQ HOSP IP/OBS MODERATE 35: CPT | Performed by: INTERNAL MEDICINE

## 2024-06-04 PROCEDURE — 99233 SBSQ HOSP IP/OBS HIGH 50: CPT | Performed by: INTERNAL MEDICINE

## 2024-06-04 NOTE — PROGRESS NOTES
This patient is well known to me.  I have treated her for her back and leg conditions and have been monitoring her home health therapy and functional/medical status.  I am happy to answer any questions regarding her prior care and treatment.  I paid a social visit to the patient and her family today.

## 2024-06-04 NOTE — CONGREGATE LIVING REVIEW
Pending sale to [facility] Authorization    The [facility] Review Committee has reviewed this case and the patient IS APPROVED for discharge to a facility for Short Term Skilled once the following procedure is followed:     - The physician discharge instructions (contained within the [facility] note for SNF) must inlcude the below appropriate and approved COVID instructions to the facility    For questions regarding CLRC approval process, please contact the CM assigned to the case.  For questions regarding RN discharge workflow, please contact the unit Clinical Leader.

## 2024-06-04 NOTE — PROGRESS NOTES
[facility]     Gastroenterology Progress Note    Joy ROGERS Alice Patient Status:  Inpatient    1931 MRN [id number]   Location [facility] 5SW/SE Attending Sherri Overton MD   Hosp Day # 0 PCP Igor Field MD       Assessment and Plan:   Nutrition/PEG tube-discussed PEG tube insertion with the patient's daughter Laura this morning.  The procedure, risks and benefits were reviewed.  She is agreeable to proceeding.  Patient is doing better today and plan for procedure tomorrow .    Recommend:  -EGD with PEG tube placement tomorrow  -Continue treatment for aspiration pneumonia  -Continue C. difficile therapy with vancomycin    Subjective:   No new complaints    Objective:   Patient Vitals for the past 24 hrs:   BP Temp Temp src Pulse Resp SpO2   24 1619 123/57 -- -- 78 18 92 %   24 0800 129/59 97.7 °F (36.5 °C) Oral -- 18 93 %   24 0514 119/64 98.1 °F (36.7 °C) Oral 82 17 95 %   24 2103 113/62 98 °F (36.7 °C) Oral 87 17 95 %   24 1900 -- -- -- 87 -- --     Body mass index is 14.28 kg/m².    Gen- Patient appears comfortable   HEENT:Negative for scleral icterus.  Mucous membranes are moist.  CV- regular rate and rhythm   Lung- Clear to auscultation bilaterally  Abdomen-Non-distended.  Bowel sounds are present.  There is no tenderness to palpation.  There are no masses appreciated.  Liver and spleen are not palpable.  Skin- No jaundice.  Warm and dry.  Ext: No cyanosis, clubbing or edema is evident.      Results:     Recent Labs   Lab 24  1532 24  0542 24  0628   RBC 3.10* 2.86* 3.13*   HGB 9.1* 8.4* 8.8*   HCT 26.8* 24.8* 29.4*   MCV 86.5 86.7 93.9   MCH 29.4 29.4 28.1   MCHC 34.0 33.9 29.9*   RDW 15.2* 15.2* 15.4*   NEPRELIM 8.98* 8.03* 7.29   WBC 10.6 9.3 9.1   .0 316.0 279.0         Recent Labs   Lab 24  1532 24  0542 24  0628   GLU 94 77 86   BUN 21 18 14   CREATSERUM 0.64 0.53* 0.56   CA 7.9* 7.6* 7.8*   ALB  2.9*  --  2.5*    135* 137   K 3.6 3.4* 3.8  3.8    102 108   CO2 30.0 26.0 22.0   ALKPHO 118  --  113   AST 13  --  16   ALT 7*  --  8*   BILT 1.3*  --  1.1*   TP 5.3*  --  5.1*       Lab Results   Component Value Date    INR 1.12 06/03/2024       XR CHEST AP PORTABLE  (CPT=71045)    Result Date: 6/2/2024  CONCLUSION:  Patchy right basilar airspace opacity, concerning for aspiration/pneumonia.  Trace right pleural effusion.  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   Dictated by (CST): Radha Stone MD on 6/02/2024 at 4:19 PM     Finalized by (CST): Radha Stone MD on 6/02/2024 at 4:23 PM                 Vadim Nunes MD  6/4/2024

## 2024-06-04 NOTE — CM/SW NOTE
CM submitted CLRC to review - received approval.    Patient changed to inpatient status 6/4/24.    Plan for peg tube placement next 24-28 hours per EMR. Palliative following     CM contacted patient's dtr Laura via phone - Laura is agreeable to TRICIA referrals.      CM submitted TRICIA referrals via [employer], PASRR completed and queued for review *will need to upload to referral.    Dtr Laura requesting TRICIA list be emailed to her at [email or web address] when available. CM informed Laura that TRICIA facilities require some time to determine if able to accommodate - CM plan to finalize list by tomorrow once responses received from facilities.    Plan: TRICIA, pending list/choice, medical clearance    Angy Greenwood RN, BSN  Nurse   [phone]

## 2024-06-04 NOTE — PLAN OF CARE
Problem: Patient Centered Care  Goal: Patient preferences are identified and integrated in the patient's plan of care  Description: Interventions:  - What would you like us to know as we care for you?   - Provide timely, complete, and accurate information to patient/family  - Incorporate patient and family knowledge, values, beliefs, and cultural backgrounds into the planning and delivery of care  - Encourage patient/family to participate in care and decision-making at the level they choose  - Honor patient and family perspectives and choices  Outcome: Progressing     Problem: Patient/Family Goals  Goal: Patient/Family Long Term Goal  Description: Patient's Long Term Goal:     Interventions:  - See additional Care Plan goals for specific interventions  Outcome: Progressing  Goal: Patient/Family Short Term Goal  Description: Patient's Short Term Goal:     Interventions:   - See additional Care Plan goals for specific interventions  Outcome: Progressing     Problem: GASTROINTESTINAL - ADULT  Goal: Maintains adequate nutritional intake (undernourished)  Description: INTERVENTIONS:  - Monitor percentage of each meal consumed  - Identify factors contributing to decreased intake, treat as appropriate  - Assist with meals as needed  - Monitor I&O, WT and lab values  - Obtain nutritional consult as needed  - Optimize oral hygiene and moisture  - Encourage food from home; allow for food preferences  - Enhance eating environment  Outcome: Progressing     Problem: SKIN/TISSUE INTEGRITY - ADULT  Goal: Skin integrity remains intact  Description: INTERVENTIONS  - Assess and document risk factors for pressure ulcer development  - Assess and document skin integrity  - Monitor for areas of redness and/or skin breakdown  - Initiate interventions, skin care algorithm/standards of care as needed  Outcome: Progressing  Goal: Incision(s), wounds(s) or drain site(s) healing without S/S of infection  Description: INTERVENTIONS:  - Assess  and document risk factors for pressure ulcer development  - Assess and document skin integrity  - Assess and document dressing/incision, wound bed, drain sites and surrounding tissue  - Implement wound care per orders  - Initiate isolation precautions as appropriate  - Initiate Pressure Ulcer prevention bundle as indicated  Outcome: Progressing     Problem: MUSCULOSKELETAL - ADULT  Goal: Return mobility to safest level of function  Description: INTERVENTIONS:  - Assess patient stability and activity tolerance for standing, transferring and ambulating w/ or w/o assistive devices  - Assist with transfers and ambulation using safe patient handling equipment as needed  - Ensure adequate protection for wounds/incisions during mobilization  - Obtain PT/OT consults as needed  - Advance activity as appropriate  - Communicate ordered activity level and limitations with patient/family  Outcome: Progressing     Problem: Impaired Activities of Daily Living  Goal: Achieve highest/safest level of independence in self care  Description: Interventions:  - Assess ability and encourage patient to participate in ADLs to maximize function  - Promote sitting position while performing ADLs such as feeding, grooming, and bathing  - Educate and encourage patient/family in tolerated functional activity level and precautions during self-care  - Provide support under elbow of weak side to prevent shoulder subluxation  - Encourage patient to incorporate impaired side during daily activities to promote function  Outcome: Progressing     Problem: Impaired Swallowing  Goal: Minimize aspiration risk  Description: Interventions:  - Patient should be alert and upright for all feedings (90 degrees preferred)  - Offer food and liquids at a slow rate  - No straws  - Encourage small bites of food and small sips of liquid  - Offer pills one at a time, crush or deliver with applesauce as needed  - Discontinue feeding and notify MD (or speech pathologist)  if coughing or persistent throat clearing or wet/gurgly vocal quality is noted  Outcome: Progressing

## 2024-06-04 NOTE — PROGRESS NOTES
[facility]  part of [facility]     Hospitalist Progress Note     Joy Jenkins Patient Status:  Observation    1931 MRN [id number]   Location [facility] 5SW/SE Attending Sherri Overton MD   Hosp Day # 0 PCP Igor Field MD     Subjective:     Patient resting comfortably in bed. No acute overnight events reported by the nursing staff.   All questions and concerns addressed.  Possible EGD with Peg tube insertion later today/tomorrow.       Objective:    Review of Systems:   ROS completed; pertinent positive and negatives stated in subjective.      Vital signs:  Temp:  [97.7 °F (36.5 °C)-98.1 °F (36.7 °C)] 97.7 °F (36.5 °C)  Pulse:  [81-87] 82  Resp:  [17-18] 18  BP: (110-129)/(52-64) 129/59  SpO2:  [93 %-96 %] 93 %      Physical Exam:    Gen: NAD AO, frail female  Chest: good air entry CTABL  CVS: normal s1 and s2 RR  Abd: NABS soft NT ND  Neuro: CN 2-12 grossly intact  Ext: no edema in bilateral LE      Diagnostic Data:    Labs:  Recent Labs   Lab 24  1532 24  0542 24  0628   WBC 10.6 9.3 9.1   HGB 9.1* 8.4* 8.8*   MCV 86.5 86.7 93.9   .0 316.0 279.0   INR  --  1.12  --        Recent Labs   Lab 24  1532 24  0542 24  0628   GLU 94 77 86   BUN 21 18 14   CREATSERUM 0.64 0.53* 0.56   CA 7.9* 7.6* 7.8*   ALB 2.9*  --  2.5*    135* 137   K 3.6 3.4* 3.8  3.8    102 108   CO2 30.0 26.0 22.0   ALKPHO 118  --  113   AST 13  --  16   ALT 7*  --  8*   BILT 1.3*  --  1.1*   TP 5.3*  --  5.1*       Estimated Creatinine Clearance: 36.3 mL/min (based on SCr of 0.56 mg/dL).    Recent Labs   Lab 24  0542   PTP 15.1*   INR 1.12              Imaging: Imaging data reviewed in Epic.    Medications:    ampicillin-sulbactam  3 g Intravenous q6h    vancomycin  125 mg Oral QID    multivitamin with minerals  1 tablet Oral Daily    thiamine  100 mg Intravenous Daily    melatonin  3 mg Oral Nightly    heparin  5,000 Units Subcutaneous Q8H [facility]        Assessment & Plan:     Failure to thrive  Anorexia  Hx of dementia  S/p fall with recent hip fracture  Family wants PEG tube insertion for nutrition  Palliative care on consult  Fall precautions  PT/OT  PRN pain control  GI on consult  PEG tube insertion in the next 24 to 48 hrs  Continue abx  Palliative care on consult  Recommend home palliative care with eventual transitioning to hospice  ?aspiration  CXR with patchy right basilar airspace opacity  NPO until cleared by SLP  Starting on IV Unasyn x 5 days  Bcx pending  C Diff positive  Started on PO Vancomycin      Plan of care discussed with patient or family at bedside.      Supplementary Documentation:     Quality:  DVT Prophylaxis: heparin s/c  CODE status: Full      Estimated date of discharge: TBD  Discharge is dependent on: clinical stability  At this point Ms. Jenkins is expected to be discharge to: home                  Sherri Overton MD  Hospitalist    MDM: High, I personally reviewed the available laboratories, imaging including RN. I discussed the case with AM labs. I ordered laboratories, studies including XR.    Medical decision making high, risk is high.       The 21st Century Cures Act makes medical notes like these available to patients in the interest of transparency. Please be advised this is a medical document. Medical documents are intended to carry relevant information, facts as evident, and the clinical opinion of the practitioner. The medical note is intended as peer to peer communication and may appear blunt or direct. It is written in medical language and may contain abbreviations or verbiage that are unfamiliar.

## 2024-06-04 NOTE — SLP NOTE
SPEECH DAILY NOTE - INPATIENT    ASSESSMENT & PLAN   ASSESSMENT    Proper PPE worn. Hands sanitized upon entrance/exit Pt room.       Pt alert, afebrile and on room air. Pt seen for swallow analysis per BSE recommendations (after consulting with RN). Dtr present. Preferred method of learning verbal. Pt upright in chair; observed with current diet of pureed/thin liquids (lunch tray) for monitoring diet tolerance. Swallowing precautions/strategies discussed as Pt is fed oral trials. Slow lingual A-P transit of pureed consistency with increased VIV. No significant oral retention. Pharyngeal response appeared to trigger within 2-3 sec per hyolaryngeal elevation to completion (functional rise/strength per palpation). No overt CSA on pureed nor thin liquids. Pt with overall reduced oral intake. G-tube placement scheduled for 6/05/24. Collaborated with RN regarding Pt's swallowing plan of care. No report of difficulty taking meds. No new CXR completed. Sp02 ~93% during this session. Call light within Pt's reach upon SLP discharge from room.      CXR 6/02/24:  CONCLUSION:   Patchy right basilar airspace opacity, concerning for aspiration/pneumonia.   Trace right pleural effusion.   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            PLAN: No continued skilled swallowing intervention required secondary plan of G-tube placement and functional swallowing skills on least restrictive SAFEST pleasure feed diet and family education completed.        Diet Recommendations - Solids: Puree  Diet Recommendations - Liquids: Thin Liquids    Compensatory Strategies Recommended: No straws;Slow rate;Small bites and sips  Aspiration Precautions: Upright position;Slow rate;Small bites and sips;No straw;Cueing to swallow  Medication Administration Recommendations: Crushed in puree    Patient Experiencing Pain: No  Treatment Plan  Treatment Plan/Recommendations:  Aspiration precautions  Interdisciplinary Communication: Discussed with RN  Plan posted at bedside      GOALS  Goal #1 The patient will tolerate puree consistency and thin liquids without overt signs or symptoms of aspiration with 90 % accuracy over 1 session(s).    No CSA on current diet. Pleasure feed diet (G-tube planned for 6/05/24).       GOAL MET   I   Goal #2 The patient/family/caregiver will demonstrate understanding and implementation of aspiration precautions and swallow strategies independently over 1 session(s).   GOAL MET     Goal #3 The patient will tolerate trial upgrade of soft easy to chew solid consistency and thin liquids without overt signs or symptoms of aspiration with 90 % accuracy over 1 session(s).    N/A   Goal discontinued.           FOLLOW UP  Follow Up Needed (Documentation Required): No  SLP Follow-up Date: 06/04/24  Number of Visits to Meet Established Goals: 1    Session: 1    If you have any questions, please contact   Angy Loza M.S. CCC/SLP  Speech-Language Pathologist  [facility]  #[phone]

## 2024-06-05 ENCOUNTER — ANESTHESIA EVENT (OUTPATIENT)
Dept: ENDOSCOPY | Facility: HOSPITAL | Age: 89
End: 2024-06-05
Payer: MEDICARE

## 2024-06-05 ENCOUNTER — ANESTHESIA (OUTPATIENT)
Dept: ENDOSCOPY | Facility: HOSPITAL | Age: 89
End: 2024-06-05
Payer: MEDICARE

## 2024-06-05 ENCOUNTER — TELEPHONE (OUTPATIENT)
Dept: FAMILY MEDICINE CLINIC | Facility: CLINIC | Age: 89
End: 2024-06-05

## 2024-06-05 LAB
ALBUMIN SERPL-MCNC: 2.5 G/DL (ref 3.2–4.8)
ALBUMIN/GLOB SERPL: 1.1 {RATIO} (ref 1–2)
ALP LIVER SERPL-CCNC: 103 U/L
ALT SERPL-CCNC: <7 U/L
ANION GAP SERPL CALC-SCNC: 6 MMOL/L (ref 0–18)
AST SERPL-CCNC: 19 U/L (ref ?–34)
BASOPHILS # BLD AUTO: 0.01 X10(3) UL (ref 0–0.2)
BASOPHILS NFR BLD AUTO: 0.2 %
BILIRUB SERPL-MCNC: 0.8 MG/DL (ref 0.2–0.9)
BUN BLD-MCNC: 16 MG/DL (ref 9–23)
BUN/CREAT SERPL: 30.2 (ref 10–20)
CALCIUM BLD-MCNC: 7.7 MG/DL (ref 8.7–10.4)
CHLORIDE SERPL-SCNC: 108 MMOL/L (ref 98–112)
CO2 SERPL-SCNC: 26 MMOL/L (ref 21–32)
CREAT BLD-MCNC: 0.53 MG/DL
DEPRECATED RDW RBC AUTO: 49.5 FL (ref 35.1–46.3)
EGFRCR SERPLBLD CKD-EPI 2021: 86 ML/MIN/1.73M2 (ref 60–?)
EOSINOPHIL # BLD AUTO: 0.08 X10(3) UL (ref 0–0.7)
EOSINOPHIL NFR BLD AUTO: 1.6 %
ERYTHROCYTE [DISTWIDTH] IN BLOOD BY AUTOMATED COUNT: 15.1 % (ref 11–15)
GLOBULIN PLAS-MCNC: 2.3 G/DL (ref 2–3.5)
GLUCOSE BLD-MCNC: 82 MG/DL (ref 70–99)
HCT VFR BLD AUTO: 24.9 %
HGB BLD-MCNC: 8.2 G/DL
IMM GRANULOCYTES # BLD AUTO: 0.07 X10(3) UL (ref 0–1)
IMM GRANULOCYTES NFR BLD: 1.4 %
LYMPHOCYTES # BLD AUTO: 0.7 X10(3) UL (ref 1–4)
LYMPHOCYTES NFR BLD AUTO: 14.1 %
MCH RBC QN AUTO: 29.3 PG (ref 26–34)
MCHC RBC AUTO-ENTMCNC: 32.9 G/DL (ref 31–37)
MCV RBC AUTO: 88.9 FL
MONOCYTES # BLD AUTO: 0.55 X10(3) UL (ref 0.1–1)
MONOCYTES NFR BLD AUTO: 11.1 %
NEUTROPHILS # BLD AUTO: 3.56 X10 (3) UL (ref 1.5–7.7)
NEUTROPHILS # BLD AUTO: 3.56 X10(3) UL (ref 1.5–7.7)
NEUTROPHILS NFR BLD AUTO: 71.6 %
OSMOLALITY SERPL CALC.SUM OF ELEC: 290 MOSM/KG (ref 275–295)
PLATELET # BLD AUTO: 275 10(3)UL (ref 150–450)
POTASSIUM SERPL-SCNC: 3.5 MMOL/L (ref 3.5–5.1)
PROT SERPL-MCNC: 4.8 G/DL (ref 5.7–8.2)
RBC # BLD AUTO: 2.8 X10(6)UL
SODIUM SERPL-SCNC: 140 MMOL/L (ref 136–145)
WBC # BLD AUTO: 5 X10(3) UL (ref 4–11)

## 2024-06-05 PROCEDURE — 0DH63UZ INSERTION OF FEEDING DEVICE INTO STOMACH, PERCUTANEOUS APPROACH: ICD-10-PCS | Performed by: INTERNAL MEDICINE

## 2024-06-05 PROCEDURE — 99233 SBSQ HOSP IP/OBS HIGH 50: CPT | Performed by: HOSPITALIST

## 2024-06-05 DEVICE — PERCUTANEOUS ENDOSCOPIC GASTROSTOMY KIT
Type: IMPLANTABLE DEVICE | Status: FUNCTIONAL
Brand: ENDOVIVE SAFETY PEG KIT

## 2024-06-05 RX ORDER — MORPHINE SULFATE 2 MG/ML
1 INJECTION, SOLUTION INTRAMUSCULAR; INTRAVENOUS EVERY 2 HOUR PRN
Status: DISCONTINUED | OUTPATIENT
Start: 2024-06-05 | End: 2024-06-07

## 2024-06-05 RX ORDER — MORPHINE SULFATE 4 MG/ML
4 INJECTION, SOLUTION INTRAMUSCULAR; INTRAVENOUS EVERY 2 HOUR PRN
Status: DISCONTINUED | OUTPATIENT
Start: 2024-06-05 | End: 2024-06-07

## 2024-06-05 RX ORDER — MORPHINE SULFATE 2 MG/ML
2 INJECTION, SOLUTION INTRAMUSCULAR; INTRAVENOUS EVERY 2 HOUR PRN
Status: DISCONTINUED | OUTPATIENT
Start: 2024-06-05 | End: 2024-06-07

## 2024-06-05 RX ORDER — SODIUM CHLORIDE, SODIUM LACTATE, POTASSIUM CHLORIDE, CALCIUM CHLORIDE 600; 310; 30; 20 MG/100ML; MG/100ML; MG/100ML; MG/100ML
INJECTION, SOLUTION INTRAVENOUS CONTINUOUS PRN
Status: DISCONTINUED | OUTPATIENT
Start: 2024-06-05 | End: 2024-06-05 | Stop reason: SURG

## 2024-06-05 RX ADMIN — SODIUM CHLORIDE, SODIUM LACTATE, POTASSIUM CHLORIDE, CALCIUM CHLORIDE: 600; 310; 30; 20 INJECTION, SOLUTION INTRAVENOUS at 18:31:00

## 2024-06-05 RX ADMIN — SODIUM CHLORIDE, SODIUM LACTATE, POTASSIUM CHLORIDE, CALCIUM CHLORIDE: 600; 310; 30; 20 INJECTION, SOLUTION INTRAVENOUS at 18:46:00

## 2024-06-05 NOTE — PLAN OF CARE
Assumed care for patient at 3pm. Patient up to the bathroom with 2 assist and the rolling chair. Po vanco given as scheduled. Will be NPO after midnight for EGD and peg tube insertion. Family aware of plan and consent signed.

## 2024-06-05 NOTE — PLAN OF CARE
Problem: Patient Centered Care  Goal: Patient preferences are identified and integrated in the patient's plan of care  Description: Interventions:  - What would you like us to know as we care for you?   - Provide timely, complete, and accurate information to patient/family  - Incorporate patient and family knowledge, values, beliefs, and cultural backgrounds into the planning and delivery of care  - Encourage patient/family to participate in care and decision-making at the level they choose  - Honor patient and family perspectives and choices  Outcome: Progressing     Problem: Patient/Family Goals  Goal: Patient/Family Long Term Goal  Description: Patient's Long Term Goal:     Interventions:  - See additional Care Plan goals for specific interventions  Outcome: Progressing  Goal: Patient/Family Short Term Goal  Description: Patient's Short Term Goal:    Interventions:   - See additional Care Plan goals for specific interventions  Outcome: Progressing     Problem: GASTROINTESTINAL - ADULT  Goal: Maintains adequate nutritional intake (undernourished)  Description: INTERVENTIONS:  - Monitor percentage of each meal consumed  - Identify factors contributing to decreased intake, treat as appropriate  - Assist with meals as needed  - Monitor I&O, WT and lab values  - Obtain nutritional consult as needed  - Optimize oral hygiene and moisture  - Encourage food from home; allow for food preferences  - Enhance eating environment  Outcome: Progressing     Problem: SKIN/TISSUE INTEGRITY - ADULT  Goal: Skin integrity remains intact  Description: INTERVENTIONS  - Assess and document risk factors for pressure ulcer development  - Assess and document skin integrity  - Monitor for areas of redness and/or skin breakdown  - Initiate interventions, skin care algorithm/standards of care as needed  Outcome: Progressing  Goal: Incision(s), wounds(s) or drain site(s) healing without S/S of infection  Description: INTERVENTIONS:  - Assess  and document risk factors for pressure ulcer development  - Assess and document skin integrity  - Assess and document dressing/incision, wound bed, drain sites and surrounding tissue  - Implement wound care per orders  - Initiate isolation precautions as appropriate  - Initiate Pressure Ulcer prevention bundle as indicated  Outcome: Progressing     Problem: MUSCULOSKELETAL - ADULT  Goal: Return mobility to safest level of function  Description: INTERVENTIONS:  - Assess patient stability and activity tolerance for standing, transferring and ambulating w/ or w/o assistive devices  - Assist with transfers and ambulation using safe patient handling equipment as needed  - Ensure adequate protection for wounds/incisions during mobilization  - Obtain PT/OT consults as needed  - Advance activity as appropriate  - Communicate ordered activity level and limitations with patient/family  Outcome: Progressing     Problem: Impaired Activities of Daily Living  Goal: Achieve highest/safest level of independence in self care  Description: Interventions:  - Assess ability and encourage patient to participate in ADLs to maximize function  - Promote sitting position while performing ADLs such as feeding, grooming, and bathing  - Educate and encourage patient/family in tolerated functional activity level and precautions during self-care  Outcome: Progressing     Problem: Impaired Swallowing  Goal: Minimize aspiration risk  Description: Interventions:  - Patient should be alert and upright for all feedings (90 degrees preferred)  - Offer food and liquids at a slow rate  - No straws  - Encourage small bites of food and small sips of liquid  - Offer pills one at a time, crush or deliver with applesauce as needed  - Discontinue feeding and notify MD (or speech pathologist) if coughing or persistent throat clearing or wet/gurgly vocal quality is noted  Outcome: Progressing

## 2024-06-05 NOTE — PROGRESS NOTES
Pt's dtr (Daniela Burks notified that endo procedure will be later, on scheduling screen now for 1750.  Verbalized understanding.

## 2024-06-05 NOTE — ANESTHESIA PREPROCEDURE EVALUATION
Anesthesia PreOp Note    HPI:     Joy Jenkins is a 89 year old female who presents for preoperative consultation requested by: Vadim Nunes MD    Date of Surgery: 6/5/2024    Procedure(s):  PERCUTANEOUS ENDOSCOPIC GASTROSTOMY PLACEMENT/JEJUNOSTOMY TUBE PLACEMENT  Indication: failure to thrive    Relevant Problems   No relevant active problems       NPO:                         History Review:  Patient Active Problem List    Diagnosis Date Noted    Weakness generalized 06/02/2024    Right leg pain 05/17/2024    Dehydration 05/17/2024    Neurologic gait dysfunction 05/17/2024    Pneumonia of right lower lobe due to infectious organism 05/17/2024    At moderate risk for fall 05/17/2024    Palliative care by specialist 12/26/2023    Closed fracture of left hip, initial encounter (HCC) 12/20/2023    Compression fracture of T11 vertebra with routine healing 09/26/2023    Moderate late onset Alzheimer's dementia without behavioral disturbance, psychotic disturbance, mood disturbance, or anxiety ([facility]) 09/26/2023    Cellulitis 09/18/2023    Protein-calorie malnutrition, unspecified severity ([facility]) 03/21/2022    Routine medical exam 05/19/2021       Past Medical History:    Moderate late onset Alzheimer's dementia without behavioral disturbance, psychotic disturbance, mood disturbance, or anxiety ([facility])       Past Surgical History:   Procedure Laterality Date    Fracture surgery      Tonsillectomy      Umbilical hernia repair  1988       Medications Prior to Admission   Medication Sig Dispense Refill Last Dose    QUEtiapine 25 MG Oral Tab Take 0.5 tablets (12.5 mg total) by mouth 2 (two) times daily as needed (agitation). (Patient taking differently: Take 0.5 tablets (12.5 mg total) by mouth nightly.) 30 tablet 2 6/1/2024    melatonin 3 MG Oral Tab    6/1/2024     Current Facility-Administered Medications Ordered in Epic   Medication Dose Route Frequency Provider Last Rate Last Admin    ampicillin-sulbactam (Unasyn) 3 g in  sodium chloride 0.9% 100mL IVPB-ADD  3 g Intravenous q6h Sherri Overton  mL/hr at 06/05/24 0900 3 g at 06/05/24 0900    vancomycin (Firvanq) 50 mg/mL oral solution 125 mg  125 mg Oral QID Vadim Nunes MD   125 mg at 06/04/24 2019    multivitamin with minerals (Thera M Plus) tab 1 tablet  1 tablet Oral Daily Sherri Overton MD   1 tablet at 06/04/24 1013    thiamine 100 mg/mL injection 100 mg  100 mg Intravenous Daily Sherri Overton MD   100 mg at 06/05/24 0844    melatonin tab 3 mg  3 mg Oral Nightly Carlos Jones MD   3 mg at 06/04/24 2248    QUEtiapine (SEROquel) tab 12.5 mg  12.5 mg Oral BID PRN Carlos Jones MD        sodium chloride 0.9% infusion   Intravenous Continuous Carlos Jones  mL/hr at 06/05/24 0155 New Bag at 06/05/24 0155    heparin (Porcine) 5000 UNIT/ML injection 5,000 Units  5,000 Units Subcutaneous Q8H [facility] Carlos Jones MD        acetaminophen (Tylenol Extra Strength) tab 500 mg  500 mg Oral Q4H PRN Carlos Jones MD        acetaminophen (Tylenol) tab 650 mg  650 mg Oral Q4H PRN Carlos Jones MD        Or    HYDROcodone-acetaminophen (Norco) 5-325 MG per tab 1 tablet  1 tablet Oral Q4H PRCarlos Fontana MD        Or    HYDROcodone-acetaminophen (Norco) 5-325 MG per tab 2 tablet  2 tablet Oral Q4H PRN Carlos Joens MD        ondansetron (Zofran) 4 MG/2ML injection 4 mg  4 mg Intravenous Q6H PRCarlos Fontana MD         No current Epic-ordered outpatient medications on file.       Allergies   Allergen Reactions    Bactrim Ds OTHER (SEE COMMENTS)    Ciprofloxacin OTHER (SEE COMMENTS)    Penicillins OTHER (SEE COMMENTS)     Tolerate amoxicillin    Sulfamethoxazole W/Trimethoprim OTHER (SEE COMMENTS)       History reviewed. No pertinent family history.  Social History     Socioeconomic History    Marital status:    Tobacco Use    Smoking status: Former    Smokeless tobacco: Never   Vaping Use    Vaping status: Never Used    Substance and Sexual Activity    Alcohol use: Yes     Alcohol/week: 1.0 standard drink of alcohol     Types: 1 Glasses of wine per week    Drug use: No   Other Topics Concern    Pt has a pacemaker No    Pt has a defibrillator No    Reaction to local anesthetic No       Available pre-op labs reviewed.  Lab Results   Component Value Date    WBC 5.0 06/05/2024    RBC 2.80 (L) 06/05/2024    HGB 8.2 (L) 06/05/2024    HCT 24.9 (L) 06/05/2024    MCV 88.9 06/05/2024    MCH 29.3 06/05/2024    MCHC 32.9 06/05/2024    RDW 15.1 (H) 06/05/2024    .0 06/05/2024     Lab Results   Component Value Date     06/05/2024    K 3.5 06/05/2024     06/05/2024    CO2 26.0 06/05/2024    BUN 16 06/05/2024    CREATSERUM 0.53 (L) 06/05/2024    GLU 82 06/05/2024    CA 7.7 (L) 06/05/2024     Lab Results   Component Value Date    INR 1.12 06/03/2024       Vital Signs:  Body mass index is 14.28 kg/m².   weight is 36.6 kg (80 lb 9.6 oz). Her axillary temperature is 98.8 °F (37.1 °C). Her blood pressure is 100/70 and her pulse is 79. Her respiration is 18 and oxygen saturation is 97%.   Vitals:    06/04/24 1902 06/04/24 2015 06/05/24 0525 06/05/24 0840   BP:  115/61 99/78 100/70   Pulse: 76 82 80 79   Resp:  17 17 18   Temp:  98.2 °F (36.8 °C) 98.3 °F (36.8 °C) 98.8 °F (37.1 °C)   TempSrc:  Axillary Axillary Axillary   SpO2:  96% 96% 97%   Weight:            Anesthesia Evaluation     Patient summary reviewed and Nursing notes reviewed    Airway   Mallampati: II  Neck ROM: full  Dental      Pulmonary - normal exam   (+) shortness of breath    ROS comment: Aspiration vs pneumonia  1. CXR with patchy right basilar airspace opacity  2. NPO until cleared by SLP  3. Starting on IV Unasyn x 5 days  4. Bcx pending        Cardiovascular - normal exam  (+) CAD    ROS comment: NSR 12/23    Neuro/Psych      Comments: Alzheimer's dementia    S/p fall and then progressively stopped eating. Family wants gtube for nutrition    GI/Hepatic/Renal     (+) GERD    Endo/Other      Comments: Failure to thrive    Abdominal  - normal exam                 Anesthesia Plan:   ASA:  3  Plan:   MAC      I have informed Joy Jenkins and/or legal guardian or family member of the nature of the anesthetic plan, benefits, risks including possible dental damage if relevant, major complications, and any alternative forms of anesthetic management.   All of the patient's questions were answered to the best of my ability. The patient desires the anesthetic management as planned.  Karolyn Lewis CRNA  6/5/2024 10:36 AM  Present on Admission:  **None**

## 2024-06-05 NOTE — CDS QUERY
Malnutrition  CLINICAL DOCUMENTATION CLARIFICATION FORM    Dear Dr. Martinez,   Clinical information (provided below) includes documentation of Severe Malnutrition by the Clinical Dietician.  PLEASE INDICATE IF YOU ARE IN AGREEMENT WITH THE FOLLOWING   ASSESSMENT BY THE CONSULTANT:  Severe Malnutrition as documented by the Clinical Dietician on : 6/3/24  [x  ] Yes, I agree with this assessment      [  ] No, I do not agree with this assessment  If not in agreement with this assessment, please provide your independent assessment of the nutritional status:  ____________________________        Documentation from the Medical Record:    Dietician Nutritional Assessment: 6/3/2024: Pt meets  severe malnutrition criteria    CRITERIA FOR MALNUTRITION DIAGNOSIS: Criteria for severe malnutrition diagnosis: chronic illness related to wt loss greater than 5% in 1 month, energy intake less than 75% for greater than 1 month, body fat severe depletion, and muscle mass severe depletion.     Nutrition Diagnosis/Problem: Severe Malnutrition related to Chronic - Physiological causes resulting in anorexia or diminished intake in the setting of Alzheimer's dementia as evidenced by DTR reported decreased po intake (barely eating) x several weeks, with 11% severe wt loss in the past 2-4 weeks, with severe Muscle mass loss and Adipose tissue depletion consistent with Low BMI of 14.2 kg/m2.      Nutrition Focused Physical Exam (NFPE): severe depletion body fat orbital region and triceps region and severe depletion muscle mass temple region, clavicle region, scapular region, shoulder region, dorsal hutchins region, thigh region, and calf region  Skin Integrity: Pressure Injury: Stage stage 1 on right heel      For questions regarding this query, please contact Clinical Documentation :   [email or web address] [phone]  Thank You  THIS FORM IS A PERMANENT PART OF THE MEDICAL RECORD

## 2024-06-05 NOTE — PLAN OF CARE
Problem: Patient Centered Care  Goal: Patient preferences are identified and integrated in the patient's plan of care  Description: Interventions:  - What would you like us to know as we care for you?   - Provide timely, complete, and accurate information to patient/family  - Incorporate patient and family knowledge, values, beliefs, and cultural backgrounds into the planning and delivery of care  - Encourage patient/family to participate in care and decision-making at the level they choose  - Honor patient and family perspectives and choices  Outcome: Progressing     Problem: Patient/Family Goals  Goal: Patient/Family Long Term Goal  Description: Patient's Long Term Goal:     Interventions:  -   - See additional Care Plan goals for specific interventions  Outcome: Progressing  Goal: Patient/Family Short Term Goal  Description: Patient's Short Term Goal:     Interventions:   -   - See additional Care Plan goals for specific interventions  Outcome: Progressing     Problem: GASTROINTESTINAL - ADULT  Goal: Maintains adequate nutritional intake (undernourished)  Description: INTERVENTIONS:  - Monitor percentage of each meal consumed  - Identify factors contributing to decreased intake, treat as appropriate  - Assist with meals as needed  - Monitor I&O, WT and lab values  - Obtain nutritional consult as needed  - Optimize oral hygiene and moisture  - Encourage food from home; allow for food preferences  - Enhance eating environment  Outcome: Progressing     Problem: SKIN/TISSUE INTEGRITY - ADULT  Goal: Skin integrity remains intact  Description: INTERVENTIONS  - Assess and document risk factors for pressure ulcer development  - Assess and document skin integrity  - Monitor for areas of redness and/or skin breakdown  - Initiate interventions, skin care algorithm/standards of care as needed  Outcome: Progressing  Goal: Incision(s), wounds(s) or drain site(s) healing without S/S of infection  Description:  INTERVENTIONS:  - Assess and document risk factors for pressure ulcer development  - Assess and document skin integrity  - Assess and document dressing/incision, wound bed, drain sites and surrounding tissue  - Implement wound care per orders  - Initiate isolation precautions as appropriate  - Initiate Pressure Ulcer prevention bundle as indicated  Outcome: Progressing     Problem: MUSCULOSKELETAL - ADULT  Goal: Return mobility to safest level of function  Description: INTERVENTIONS:  - Assess patient stability and activity tolerance for standing, transferring and ambulating w/ or w/o assistive devices  - Assist with transfers and ambulation using safe patient handling equipment as needed  - Ensure adequate protection for wounds/incisions during mobilization  - Obtain PT/OT consults as needed  - Advance activity as appropriate  - Communicate ordered activity level and limitations with patient/family  Outcome: Progressing     Problem: Impaired Activities of Daily Living  Goal: Achieve highest/safest level of independence in self care  Description: Interventions:  - Assess ability and encourage patient to participate in ADLs to maximize function  - Promote sitting position while performing ADLs such as feeding, grooming, and bathing  - Educate and encourage patient/family in tolerated functional activity level and precautions during self-care    Outcome: Progressing     Problem: Impaired Swallowing  Goal: Minimize aspiration risk  Description: Interventions:  - Patient should be alert and upright for all feedings (90 degrees preferred)  - Offer food and liquids at a slow rate  - No straws  - Encourage small bites of food and small sips of liquid  - Offer pills one at a time, crush or deliver with applesauce as needed  - Discontinue feeding and notify MD (or speech pathologist) if coughing or persistent throat clearing or wet/gurgly vocal quality is noted  Outcome: Progressing 6/5/24 npo, awaiting endo for g tube  placement.

## 2024-06-05 NOTE — OPERATIVE REPORT
[facility] Endoscopy Report  Date of procedure-June 5, 2024    Preoperative Diagnosis:  -Nutritional support/PEG tube placement  -Dysphagia      Postoperative Diagnosis:  -Successful PEG tube placement  -Small hiatal hernia      Procedure:    Esophagogastroduodenoscopy with PEG tube insertion      Surgeon:  Vadim Nunes M.D.    Anesthesia:  MAC     Technique:  After informed consent, the patient was placed in the left lateral recumbent position.  An Olympus adult HD gastroscope was inserted into the hypopharynx and advanced under direct vision into the esophagus, stomach and duodenum.  The endoscope was withdrawn to the stomach where retroflexion of the annulus, body, cardia and fundus was performed.      An appropriate spot was located for PEG tube placement via transillumination and single finger point indentation.  The area was then prepped and draped in sterile fashion.  1% lidocaine was used to anesthetize the skin and subcutaneous structures.  Small incision was made in the skin with a scalpel, a single pass was made with a trocar to enter the stomach.  Guidewire was grasped with a snare and pulled out through the mouth.  Fixed to the guidewire was a 20 Uzbek PEG tube which was pulled back into position.  Relook upper endoscopy confirmed appropriate placement of the internal bumper.  The external bumper was placed at 3 and half centimeters and the surgical site was draped and binder placed over the tube itself.    The procedure was well tolerated without immediate complication.      Findings:  The esophagus was normal.  The GE junction and diaphragmatic impression were at 38 cm and 40 cm for 2 cm hiatal hernia.  The stomach distended appropriately with insufflated air.  The mucosa of the stomach including cardia, fundus, gastric body and antrum were normal.  The duodenal bulb and post bulbar regions were normal.    Estimated blood  loss-insignificant  Specimens-none    Impression:  -Successful PEG tube placement  -Small hiatal hernia    Recommendations:  - Post procedure instructions given  - Do not use the tube until cleared by GI in the a.m.          Vadim Nunes MD  6/5/2024  6:49 PM

## 2024-06-05 NOTE — ANESTHESIA POSTPROCEDURE EVALUATION
Patient: Joy Jenkins    Procedure Summary       Date: 06/05/24 Room / Location: [facility] ENDOSCOPY 01 / [facility] ENDOSCOPY    Anesthesia Start: 1831 Anesthesia Stop: 1847    Procedure: PERCUTANEOUS ENDOSCOPIC GASTROSTOMY PLACEMENT/JEJUNOSTOMY TUBE PLACEMENT Diagnosis: (successful placement of PEG tube)    Surgeons: Vadim Nunes MD Anesthesiologist: Mya Lopez MD    Anesthesia Type: MAC ASA Status: 3            Anesthesia Type: MAC    Vitals Value Taken Time   /72 06/05/24 1847   Temp 98 06/05/24 1847   Pulse 80 06/05/24 1847   Resp 16 06/05/24 1847   SpO2 99 06/05/24 1847       [facility] AN Post Evaluation:   Patient Evaluated in PACU  Patient Participation: complete - patient participated  Level of Consciousness: awake  Pain Management: adequate  Airway Patency:patent  Dental exam unchanged from preop  Yes    Cardiovascular Status: acceptable  Respiratory Status: acceptable  Postoperative Hydration acceptable      MYA LOPEZ MD  6/5/2024 6:47 PM

## 2024-06-05 NOTE — PROGRESS NOTES
[facility]  part of [facility]     Hospitalist Progress Note     Joy Jenkins Patient Status:  Observation    1931 MRN [id number]   Location [facility] 5SW/SE Attending Steffen Martinez MD   Hosp Day # 1 PCP Igor Field MD     Subjective:     Patient resting comfortably in bed. AAOX 1  Grand son at bedside   No other complaints  No acute resp distress      Objective:    Review of Systems:   ROS completed; pertinent positive and negatives stated in subjective.      Vital signs:  Temp:  [98.2 °F (36.8 °C)-98.8 °F (37.1 °C)] 98.8 °F (37.1 °C)  Pulse:  [76-82] 79  Resp:  [17-18] 18  BP: ()/(57-78) 100/70  SpO2:  [92 %-97 %] 97 %      Physical Exam:    Gen: NAD AO, frail female  Chest: good air entry CTABL  CVS: normal s1 and s2 RR  Abd: NABS soft NT ND  Neuro: CN 2-12 grossly intact  Ext: no edema in bilateral LE      Diagnostic Data:    Labs:  Recent Labs   Lab 24  1532 24  0542 24  0628 24  0609   WBC 10.6 9.3 9.1 5.0   HGB 9.1* 8.4* 8.8* 8.2*   MCV 86.5 86.7 93.9 88.9   .0 316.0 279.0 275.0   INR  --  1.12  --   --        Recent Labs   Lab 24  1532 24  0542 24  0628 24  0609   GLU 94 77 86 82   BUN 21 18 14 16   CREATSERUM 0.64 0.53* 0.56 0.53*   CA 7.9* 7.6* 7.8* 7.7*   ALB 2.9*  --  2.5* 2.5*    135* 137 140   K 3.6 3.4* 3.8  3.8 3.5    102 108 108   CO2 30.0 26.0 22.0 26.0   ALKPHO 118  --  113 103   AST 13  --  16 19   ALT 7*  --  8* <7*   BILT 1.3*  --  1.1* 0.8   TP 5.3*  --  5.1* 4.8*       Estimated Creatinine Clearance: 38.3 mL/min (A) (based on SCr of 0.53 mg/dL (L)).    Recent Labs   Lab 24  0542   PTP 15.1*   INR 1.12              Imaging: Imaging data reviewed in Epic.    Medications:    ampicillin-sulbactam  3 g Intravenous q6h    vancomycin  125 mg Oral QID    multivitamin with minerals  1 tablet Oral Daily    thiamine  100 mg Intravenous Daily    melatonin  3 mg Oral Nightly    heparin   5,000 Units Subcutaneous Q8H [facility]       Assessment & Plan:     Failure to thrive  Anorexia  Hx of dementia  S/p fall with recent hip fracture  Family wants PEG tube insertion for nutrition, will undergo today  Palliative care on consult  Fall precautions  PT/OT  PRN pain control  GI on consult  PEG tube insertion today  Continue abx  Palliative care on consult  Recommend home palliative care with eventual transitioning to hospice  ?aspiration  CXR with patchy right basilar airspace opacity  NPO until cleared by SLP  Cont IV Unasyn x 5 days  Bcx pending -> NGTD  C Diff positive  Started on PO Vancomycin      Plan of care discussed with patient or family at bedside.      Supplementary Documentation:     Quality:  DVT Prophylaxis: heparin s/c  CODE status: Full      Estimated date of discharge: TBD  Discharge is dependent on: clinical stability  At this point Ms. Jenkins is expected to be discharge to: home    MDM: High

## 2024-06-05 NOTE — CM/SW NOTE
MARCIO followed up on discharge planning.    PASRR completed and uploaded to Aidin- no level II required.    MARCIO emailed [facility] list to pt's daughter Laura at [email or web address].    Pt to have PEG placed later today/tomorrow per MD note.  Pt will need to be at goal rate prior to discharge.    Isolation room needed at [facility] for C Diff- SW to confirm with choice facility day of discharge before discharge that ISO room available.    PLAN: DC to [facility] pending choice and [facility] clear    / to remain available for support and/or discharge planning.     Deysi Mendoza MSW, LSW [id number]

## 2024-06-06 ENCOUNTER — MED REC SCAN ONLY (OUTPATIENT)
Dept: PHYSICAL MEDICINE AND REHAB | Facility: CLINIC | Age: 89
End: 2024-06-06

## 2024-06-06 PROCEDURE — 99233 SBSQ HOSP IP/OBS HIGH 50: CPT | Performed by: HOSPITALIST

## 2024-06-06 RX ORDER — SODIUM BICARBONATE 650 MG/1
325 TABLET ORAL AS NEEDED
Status: DISCONTINUED | OUTPATIENT
Start: 2024-06-06 | End: 2024-06-07

## 2024-06-06 NOTE — DIETARY NOTE
ADULT NUTRITION REASSESSMENT      RECOMMENDATIONS TO MD: See Nutrition Intervention for Enteral Nutrition ( EN).  specifics      Pt is at high nutrition risk.  Pt meets severe malnutrition criteria.      CRITERIA FOR MALNUTRITION DIAGNOSIS: Criteria for severe malnutrition diagnosis: chronic illness related to wt loss greater than 5% in 1 month, energy intake less than 75% for greater than 1 month, body fat severe depletion, and muscle mass severe depletion.     ADMITTING DIAGNOSIS:  Weakness generalized [R53.1]     PERTINENT PAST MEDICAL HISTORY:    Past Medical History:    Moderate late onset Alzheimer's dementia without behavioral disturbance, psychotic disturbance, mood disturbance, or anxiety (HCC)    has a past surgical history that includes Umbilical hernia repair (1988); tonsillectomy; and Fracture surgery.     PATIENT STATUS: Initial 06/03/24: Patient (pt) identified at Nutrition risk due to poor po and unintentional wt loss after the screening process.  Received MD consult for Poor po intake. Presented with decreased appetite, inability to eat for several weeks, came in to evaluate for PEG placement per family wishes.  Medical findings:  Failure to Thrive, anorexia, + Cdiff, ? Aspiration pneumonia.   Upon visit, Pt sitting up in chair, pt is poor historian but  family (DTR Daniela & grandson Keven) at bedside. Diet recently advanced on modified diet following swallow eval. Diet hx/eval:    Prolonged inadequate nutrition intake based on reported decreased po, barely eating meals x several weeks. Wt eval:    11% or 10# severe wt loss in the past 3-4 weeks based on reported usual wt of 90# ( consistent with emr wt hx), compared to current wt of 80#. Nutrition findings:    Low BMI at 14.2 kg/m2. Met severe malnutrition. Palliative  Care consulted, eval noted, hence will await for GOC decisions vs PEG.  Agree with  current diet per SLP and need to initiate Oral Nutrition Supplement ( ONS ) . Discussed with RN to  obtain phos level. Nutrition plan discussed with family. See Nutrition Intervention for details.      6/6/2024 Update:  s/p PEG placed 6/5/24. Received MD consult to initiate/manage EN.   Currently NPO, IVF NS @ 100 ml/hr. +Cdiff. Pt on Puree, thin liq per SLP eval ( prior to NPO). DTR wishes to resume puree diet while on EN at home.    Will initiate EN at low rate and gradually increase d/t high risk for refeeding syndrome and use non-fiber formula temporary d/t +C-diff. Discussed with RN recommendation to discontinue IVF as full Free Water flushes ( FWF) will be ordered. Also recommend transition to Bolus feeding for long term Home regimen, see Nutrition intervention for recommendations     FOOD/NUTRITION INTAKE ANALYSIS:    Current Appetite: poor  Current Intake:  NPO  Current Intake Meeting Needs: No, but oral nutrition supplements (ONS) to maximize  Percent Meals Eaten (last 6 days)       Date/Time Percent Meals Eaten (%)    06/03/24 1300 100 %    06/04/24 1000 75 %    06/04/24 1300 60 %    06/05/24 0900 0 %     Percent Meals Eaten (%): NPO at 06/05/24 0900    06/05/24 1229 0 %     Percent Meals Eaten (%): NPO at 06/05/24 1229    06/05/24 1300 0 %     Percent Meals Eaten (%): npo for endo at 06/05/24 1300    06/05/24 1700 0 %     Percent Meals Eaten (%): NPO in endo at 06/05/24 1700           Food Allergies: No Known Food Allergies (NKFA)  Cultural/Ethnic/Adventism Preferences: None    GASTROINTESTINAL: +BM 6/3 Loose brown med stool x 1, +C diff, and Swallow evaluation noted     MEDICATIONS: reviewed    dextrose 10%      sodium chloride 100 mL/hr at 06/06/24 0201      ampicillin-sulbactam  3 g Intravenous q6h    vancomycin  125 mg Oral QID    multivitamin with minerals  1 tablet Oral Daily    thiamine  100 mg Intravenous Daily    melatonin  3 mg Oral Nightly    heparin  5,000 Units Subcutaneous Q8H ANGELA       LABS: reviewed  Recent Labs     06/03/24  0542 06/04/24  0628 06/05/24  0609   GLU 77 86 82   BUN 18 14  16   CREATSERUM 0.53* 0.56 0.53*   CA 7.6* 7.8* 7.7*   MG 1.9  --   --    * 137 140   K 3.4* 3.8  3.8 3.5    108 108   CO2 26.0 22.0 26.0   PHOS 3.5  --   --    OSMOCALC 281 284 290       NUTRITION RELATED PHYSICAL FINDINGS:  - Nutrition Focused Physical Exam (NFPE): severe depletion body fat orbital region and triceps region and severe depletion muscle mass temple region, clavicle region, scapular region, shoulder region, dorsal hutchins region, thigh region, and calf region   - Fluid Accumulation: none  see RN documentation for details  - Skin Integrity: Pressure Injury: Stage stage 1 on right heel  see RN documentation for details    ANTHROPOMETRICS:  HT:  5'3\"  WT: 36.6 kg (80 lb 9.6 oz)   BMI: Body mass index is 16.28 kg/m².  BMI CLASSIFICATION: less than 19 kg/m2 - underweight  IBW: 115 lbs        70% IBW  Usual Body Wt: 90 lbs per DTR      89% UBW  WEIGHT HISTORY:    Patient Weight(s) for the past 336 hrs:   Weight   06/02/24 1719 36.6 kg (80 lb 9.6 oz)   06/02/24 1712 36.6 kg (80 lb 9.6 oz)     Wt Readings from Last 10 Encounters:   06/02/24 36.6 kg (80 lb 9.6 oz)   12/28/23 42.5 kg (93 lb 12.8 oz)   01/05/22 45.7 kg (100 lb 12.8 oz)   11/18/21 49 kg (108 lb)   09/10/21 46.4 kg (102 lb 6.4 oz)   05/19/21 46.4 kg (102 lb 3.2 oz)   06/04/19 49.4 kg (109 lb)   05/23/19 50.1 kg (110 lb 6.4 oz)   05/15/19 50.3 kg (111 lb)   03/18/19 47.2 kg (104 lb)       NUTRITION DIAGNOSIS/PROBLEM:    Severe Malnutrition related to Chronic - Physiological causes resulting in anorexia or diminished intake in the setting of Alzheimer's dementia as evidenced by DTR reported decreased po intake (barely eating) x several weeks, with 11% severe wt loss in the past 2-4 weeks, with severe Muscle mass loss and Adipose tissue depletion consistent with Low BMI of 14.2 kg/m2.  Nutrition Diagnosis Progress: Improvement (unresolved). Nutrition Support Therapy (NST) in place.        NUTRITION INTERVENTION:     NUTRITION  PRESCRIPTION:   Estimated Nutrition needs: Dosing wt of 36.6 kg --wt taken on 6/2 .  Calories: 4807-4733 calories/day (35-40 calories per kg Dosing wt)  Protein: 55-66 g protein/day (1.5-1.8 g protein/kg  Dosing wt)   Fluids: ~ 1300  ml /day (1 ml/kcal ml/kg )    - Diet:       Procedures    NPO    - Enteral Nutrition ( EN) : RD ordered the following,  EN Osmolite 1.2  @ 20 ml/hr, increase by 10 ml q 6 hrs goal rate of 50 ml/hr via PEG on ave 22 hr infusion time.         Water flushes of 130 ml q 4 hr (780 ml).  (Prescription  provides 1320 kcal, 61 g pro & 902 ml free water.   Total free water of 1682 ml/day.   Met 100% of kcal & 100 % of Protein needs and or >100 % RDIs)  Recommend to adjust IV fluids accordingly.   Above orders discussed with RN.     Home BOLUS feeding EN ( via PEG)  recommendations:   Osmolite 1.2 ( or equivalent, may substitute)  total of 4 1/2 cans/day ( 1 1/2 cans at breakfast, 1 can at lunch, dinner and HS).   90 ml water flushes before and after each feeding ( 720 ml)   Provides 1285 kcal, 59 g protein, 878 ml free water, total free water of 1618 ml/day      - RD Malnutrition Care Plan: Initiated ONS (oral nutritional supplements) and Plan Pleasure feeds as safe.  Main source of Nutrition via Bolus tube feeding for Long term nutrition therapy.   - Medical Food Supplements- Ensure Compact (220 calories/ 9 g protein each) BID Vanilla or Chocolate and Magic Cup (290 calories/ 9 g protein each) BID Vanilla or Chocolate--added by RD    - Vitamin and mineral supplements: multivitamin/mineral and thiamin/ RD  ( stopped on 6/6 as pt now on EN).  - Feeding assistance: meal set up and feed  - Nutrition education: Discussed importance of adequate energy and protein intake   - Coordination of nutrition care: collaboration with other providers  - Discharge and transfer of nutrition care to new setting or provider: monitor plans      MONITOR AND EVALUATE/NUTRITION GOALS:  - Food and Nutrient Intake:     Monitor:  pleasure feeds.   - Food and Nutrient Administration:    Monitor: tolerance to enteral nutrition, adequacy of enteral nutrition, and for enteral nutrition adjustment  - Anthropometric Measurement:    Monitor weight  - Nutrition Goals:    halt wt loss, gradual wt gain as able, PO and nutrition support to meet greater than 80% of needs, labs within acceptable limits, minimize lean body mass loss, prevent skin breakdown, and improved GI status      RD will follow up.    Juliana Sorensen RD, LDN, [facility]  Clinical Dietitian  [phone]

## 2024-06-06 NOTE — PLAN OF CARE
Problem: Patient Centered Care  Goal: Patient preferences are identified and integrated in the patient's plan of care  Description: Interventions:  - What would you like us to know as we care for you?   - Provide timely, complete, and accurate information to patient/family  - Incorporate patient and family knowledge, values, beliefs, and cultural backgrounds into the planning and delivery of care  - Encourage patient/family to participate in care and decision-making at the level they choose  - Honor patient and family perspectives and choices  Outcome: Progressing        Problem: GASTROINTESTINAL - ADULT  Goal: Maintains adequate nutritional intake (undernourished)  Description: INTERVENTIONS:  - Monitor percentage of each meal consumed  - Identify factors contributing to decreased intake, treat as appropriate  - Assist with meals as needed  - Monitor I&O, WT and lab values  - Obtain nutritional consult as needed  - Optimize oral hygiene and moisture  - Encourage food from home; allow for food preferences  - Enhance eating environment  Outcome: Progressing     Problem: SKIN/TISSUE INTEGRITY - ADULT  Goal: Skin integrity remains intact  Description: INTERVENTIONS  - Assess and document risk factors for pressure ulcer development  - Assess and document skin integrity  - Monitor for areas of redness and/or skin breakdown  - Initiate interventions, skin care algorithm/standards of care as needed  Outcome: Progressing  Goal: Incision(s), wounds(s) or drain site(s) healing without S/S of infection  Description: INTERVENTIONS:  - Assess and document risk factors for pressure ulcer development  - Assess and document skin integrity  - Assess and document dressing/incision, wound bed, drain sites and surrounding tissue  - Implement wound care per orders  - Initiate isolation precautions as appropriate  - Initiate Pressure Ulcer prevention bundle as indicated  Outcome: Progressing     Problem: MUSCULOSKELETAL - ADULT  Goal:  Return mobility to safest level of function  Description: INTERVENTIONS:  - Assess patient stability and activity tolerance for standing, transferring and ambulating w/ or w/o assistive devices  - Assist with transfers and ambulation using safe patient handling equipment as needed  - Ensure adequate protection for wounds/incisions during mobilization  - Obtain PT/OT consults as needed  - Advance activity as appropriate  - Communicate ordered activity level and limitations with patient/family  Outcome: Progressing     Problem: Impaired Activities of Daily Living  Goal: Achieve highest/safest level of independence in self care  Description: Interventions:  - Assess ability and encourage patient to participate in ADLs to maximize function  - Promote sitting position while performing ADLs such as feeding, grooming, and bathing  - Educate and encourage patient/family in tolerated functional activity level and precautions during self-care    Outcome: Progressing     Problem: Impaired Swallowing  Goal: Minimize aspiration risk  Description: Interventions:  - Patient should be alert and upright for all feedings (90 degrees preferred)  - Offer food and liquids at a slow rate  - No straws  - Encourage small bites of food and small sips of liquid  - Offer pills one at a time, crush or deliver with applesauce as needed  - Discontinue feeding and notify MD (or speech pathologist) if coughing or persistent throat clearing or wet/gurgly vocal quality is noted  Outcome: Progressing

## 2024-06-06 NOTE — OCCUPATIONAL THERAPY NOTE
OCCUPATIONAL THERAPY TREATMENT NOTE - INPATIENT        Room Number: 546/546-A     Presenting Problem: generalized weakness, fall (daughter reports R hip fx and WBAT, declined f/u imaging this admission)    Problem List  Principal Problem:    Weakness generalized      OCCUPATIONAL THERAPY ASSESSMENT   Patient demonstrates fair progress this session, goals remain in progress. Patient s/p PEG tube placement 6/5.    Patient continues to function below baseline with ADLs and fx mobility/transfers.   Contributing factors to remaining limitations include decreased functional strength, decreased functional reach, decreased endurance, pain, impaired balance, decreased muscular endurance, cognitive deficits, and decreased safety awareness.  Next session anticipate patient to progress bed mobility, transfers, stating sitting balance, and dynamic sitting balance.  Occupational Therapy will continue to follow patient for duration of hospitalization.    Patient continues to benefit from continued skilled OT services: at discharge to promote functional independence and safety with additional support and return home with home health OT.     PLAN  OT Treatment Plan: Balance activities;Energy conservation/work simplification techniques;ADL training;Functional transfer training;Endurance training;Patient/Family education;Patient/Family training;Equipment eval/education;Compensatory technique education  OT Device Recommendations: TBD    SUBJECTIVE  Patient agreeable to OT treatment session.    OBJECTIVE  Precautions: Aspiration;Bed/chair alarm (PEG)    WEIGHT BEARING RESTRICTION  R Lower Extremity: Weight Bearing as Tolerated (per patient's daughter)    PAIN ASSESSMENT  Rating: -- (not rated)  Location: PEG site  Management Techniques: Nurse notified; Repositioning; Activity promotion; Body mechanics    ACTIVITIES OF DAILY LIVING ASSESSMENT  AM-PAC ‘6-Clicks’ Inpatient Daily Activity Short Form  How much help from another person does  the patient currently need…  -   Putting on and taking off regular lower body clothing?: A Lot  -   Bathing (including washing, rinsing, drying)?: A Lot  -   Toileting, which includes using toilet, bedpan or urinal? : A Lot  -   Putting on and taking off regular upper body clothing?: A Little  -   Taking care of personal grooming such as brushing teeth?: A Little  -   Eating meals?: A Little    AM-PAC Score:  Score: 15  Approx Degree of Impairment: 56.46%  Standardized Score (AM-PAC Scale): 34.69  CMS Modifier (G-Code): CK    BED MOBILITY  Supine <> Sit: Max assist x1  Comments: Tolerated sitting at EOB ~10 minutes with initially mod progressing to CGA for static sitting balance with L UE support on bedrail. Tolerated 2 x 5 repetitions of B knee extensions with brief recovery period d/t fatigue. Benefited from simple commands and cues for full ROM. Required max assist x2 for boosting closer to HOB. Patient's daughter confirmed she feels comfortable providing level of assist patient is currently requiring in home setting as family and part-time caregiver have been caring for patient at this level of function since her hip fx.    FUNCTIONAL TRANSFER ASSESSMENT  Patient declined to progress mobility at this time.    FUNCTIONAL ADL ASSESSMENT  LB Dressing: max assist  Toileting: total assist  Comments:  Ordered Busy Apron from [employer] d/t patient fidgeting with B hands at PEG tube site.    EDUCATION PROVIDED  Patient: Role of Occupational Therapy; Discharge Recommendations; Plan of Care; Posture/Positioning; Proper Body Mechanics  Patient's Response to Education: Demonstrates Poor Carry Over to Information  Family/Caregiver: Plan of Care; Role of Occupational Therapy; Discharge Recommendations  Family/Caregiver's Response to Education: Verbalized Understanding    The patient's Approx Degree of Impairment: 56.46% has been calculated based on documentation in the [facility] '6 clicks' Inpatient Daily Activity Short  Form.  Research supports that patients with this level of impairment may benefit from rehab.  Final disposition will be made by interdisciplinary medical team.    Patient End of Session: Needs met;Call light within reach;RN aware of session/findings;All patient questions and concerns addressed;In bed;Alarm set;Family present    OT Goals:  Patient's self stated goal is: none stated     Patient will complete functional transfer with CGA  Comment: ongoing    Patient will tolerate standing for 2-3 minutes in prep for adls with CGA  Comment: ongoing    Patient will complete item retrieval with CGA  Comment: ongoing          Goals  on: 24  Frequency: 3-5x/week    OT Session Time  Therapeutic Activity: 23 minutes    SOBIA Sales/L  [facility]  #[id number]

## 2024-06-06 NOTE — PROGRESS NOTES
[facility]  part of [facility]     Hospitalist Progress Note     Joy Jenkins Patient Status:  Observation    1931 MRN [id number]   Location [facility] 5SW/SE Attending Steffen Martinez MD   Hosp Day # 2 PCP Igor Field MD     Subjective:     Patient resting comfortably in bed. AAOX 1  Daughter at bedside  No acute distress      Objective:    Review of Systems:   ROS completed; pertinent positive and negatives stated in subjective.      Vital signs:  Temp:  [97.2 °F (36.2 °C)-98.4 °F (36.9 °C)] 98.4 °F (36.9 °C)  Pulse:  [72-81] 77  Resp:  [14-25] 18  BP: ()/(57-72) 138/70  SpO2:  [91 %-96 %] 93 %      Physical Exam:    Gen: NAD AO, frail female  Chest: good air entry CTABL  CVS: normal s1 and s2 RR  Abd: NABS soft NT ND, peg tube in place  Neuro: CN 2-12 grossly intact  Ext: no edema in bilateral LE      Diagnostic Data:    Labs:  Recent Labs   Lab 24  1532 24  0542 24  0628 24  0609   WBC 10.6 9.3 9.1 5.0   HGB 9.1* 8.4* 8.8* 8.2*   MCV 86.5 86.7 93.9 88.9   .0 316.0 279.0 275.0   INR  --  1.12  --   --        Recent Labs   Lab 24  1532 24  0542 24  0628 24  0609   GLU 94 77 86 82   BUN 21 18 14 16   CREATSERUM 0.64 0.53* 0.56 0.53*   CA 7.9* 7.6* 7.8* 7.7*   ALB 2.9*  --  2.5* 2.5*    135* 137 140   K 3.6 3.4* 3.8  3.8 3.5    102 108 108   CO2 30.0 26.0 22.0 26.0   ALKPHO 118  --  113 103   AST 13  --  16 19   ALT 7*  --  8* <7*   BILT 1.3*  --  1.1* 0.8   TP 5.3*  --  5.1* 4.8*       Estimated Creatinine Clearance: 38.3 mL/min (A) (based on SCr of 0.53 mg/dL (L)).    Recent Labs   Lab 24  0542   PTP 15.1*   INR 1.12              Imaging: Imaging data reviewed in Epic.    Medications:    ampicillin-sulbactam  3 g Intravenous q6h    vancomycin  125 mg Oral QID    melatonin  3 mg Oral Nightly    heparin  5,000 Units Subcutaneous Q8H ANGELA       Assessment & Plan:     Failure to thrive  Anorexia  Hx of  dementia  S/p fall with recent hip fracture  Family wants PEG tube insertion for nutrition > placed 6/5/24 and functioning   Palliative care on consult  Fall precautions  PT/OT  PRN pain control  GI on consult  PEG tube inserted  Continue abx  Palliative care on consult  Recommend home palliative care with eventual transitioning to hospice  ?aspiration  CXR with patchy right basilar airspace opacity  Okay for pleasure feeds -> recommended DNAR  Cont IV Unasyn x 5 days  Bcx pending -> NGTD  C Diff positive  Started on PO Vancomycin      Patient needs tube feeding for greater than 90 days.     Plan of care discussed with patient or family at bedside.      Supplementary Documentation:     Quality:  DVT Prophylaxis: heparin s/c  CODE status: Full for now, family given POLST form      Estimated date of discharge: TBD  Discharge is dependent on: clinical stability  At this point Ms. Jenkins is expected to be discharge to: home    MDM: High

## 2024-06-06 NOTE — PROGRESS NOTES
[facility]     Gastroenterology Progress Note    Joy A Alice Patient Status:  Inpatient    1931 MRN [id number]   Location [facility] 5SW/SE Attending Steffen Martinez MD   Hosp Day # 2 PCP Igor Field MD       Assessment and Plan:   Post PEG check-PEG tube inserted without difficulty yesterday, .  Examination of the abdomen shows it to be soft, site itself appears to be clean and dry.  The tube itself was manipulated and appears to be in the correct location, external bumper noted at 3.5 cm.  I was able to infuse 30 cc of water without difficulty.    Okay to use the tube-dietitian for recommendations regarding refeeding.    To call with issues or questions, will sign off.      Subjective:   Discomfort at tube site (expected post PEG)    Objective:   Patient Vitals for the past 24 hrs:   BP Temp Temp src Pulse Resp SpO2 Height   24 0927 138/70 98.4 °F (36.9 °C) Axillary 77 18 93 % --   24 0654 123/67 97.4 °F (36.3 °C) Temporal 72 18 94 % --   24 1929 127/70 97.2 °F (36.2 °C) Oral 78 -- 94 % --   24 1910 -- -- -- 77 -- -- --   24 1905 111/71 -- -- 77 25 94 % --   24 1900 113/69 -- -- 76 14 93 % --   24 1856 105/57 -- -- 76 16 95 % --   24 1855 99/58 -- -- 75 16 96 % --   24 1700 124/70 -- -- 79 21 91 % --   24 1640 -- -- -- -- -- -- 4' 11\" (1.499 m)   24 1637 132/65 -- -- 81 19 93 % --   24 1635 132/65 -- -- 81 19 93 % --   24 1624 110/72 98.4 °F (36.9 °C) Axillary -- 18 96 % --     Body mass index is 16.28 kg/m².    Gen- Patient appears comfortable and in no acute distress  HEENT:Negative for scleral icterus.  Mucous membranes are moist.  CV- regular rate and rhythm   Lung- Clear to auscultation bilaterally  Abdomen-Non-distended.  Bowel sounds are present.  There is no tenderness to palpation.  There are no masses appreciated.  Liver and spleen are not palpable.  Skin- No jaundice.  Warm and  dry.  Ext: No cyanosis, clubbing or edema is evident.   Neuro- Alert and interactive, and gross movements of extremities normal  Affect-Normal      Results:     Recent Labs   Lab 06/03/24  0542 06/04/24  0628 06/05/24  0609   RBC 2.86* 3.13* 2.80*   HGB 8.4* 8.8* 8.2*   HCT 24.8* 29.4* 24.9*   MCV 86.7 93.9 88.9   MCH 29.4 28.1 29.3   MCHC 33.9 29.9* 32.9   RDW 15.2* 15.4* 15.1*   NEPRELIM 8.03* 7.29 3.56   WBC 9.3 9.1 5.0   .0 279.0 275.0         Recent Labs   Lab 06/02/24  1532 06/03/24  0542 06/04/24  0628 06/05/24  0609   GLU 94 77 86 82   BUN 21 18 14 16   CREATSERUM 0.64 0.53* 0.56 0.53*   CA 7.9* 7.6* 7.8* 7.7*   ALB 2.9*  --  2.5* 2.5*    135* 137 140   K 3.6 3.4* 3.8  3.8 3.5    102 108 108   CO2 30.0 26.0 22.0 26.0   ALKPHO 118  --  113 103   AST 13  --  16 19   ALT 7*  --  8* <7*   BILT 1.3*  --  1.1* 0.8   TP 5.3*  --  5.1* 4.8*       Lab Results   Component Value Date    INR 1.12 06/03/2024       No results found.          Vadim Nunes MD  6/6/2024

## 2024-06-06 NOTE — CM/SW NOTE
MARCIO followed up on discharge planning.    Per [place] with dietary, pt's daughter Daniela discussed home health care and home tube feeds.  Pt on continuous feeds now- will transition to bolus feeds for home.  New TF orders needed for home bolus.    Pt is active with [facility] for HHC RN.    MARCIO sent infusion referrals in [place] for home tube feeds.    Liaison Wendy from [facility] confirming if pt meets Medicare criteria for home tube feeds (dx: severe malnutrition).    If pt bill for denial, self-pay cost is roughly $250 monthly for bolus feeds.    MARCIO needs MD to document for Medicare coverage:     Patient needs tube feeding for greater than 90 days.    PLAN: DC home w/[facility] and [facility] for home tube feeds pending approval vs. [facility] pending choice    / to remain available for support and/or discharge planning.     Deysi Mendoza MSW, LSW [id number]

## 2024-06-07 VITALS
HEART RATE: 89 BPM | RESPIRATION RATE: 18 BRPM | TEMPERATURE: 98 F | BODY MASS INDEX: 16.25 KG/M2 | DIASTOLIC BLOOD PRESSURE: 67 MMHG | OXYGEN SATURATION: 92 % | WEIGHT: 80.63 LBS | HEIGHT: 59 IN | SYSTOLIC BLOOD PRESSURE: 128 MMHG

## 2024-06-07 LAB
ALBUMIN SERPL-MCNC: 2.5 G/DL (ref 3.2–4.8)
ANION GAP SERPL CALC-SCNC: 3 MMOL/L (ref 0–18)
BASOPHILS # BLD AUTO: 0.02 X10(3) UL (ref 0–0.2)
BASOPHILS NFR BLD AUTO: 0.2 %
BUN BLD-MCNC: 16 MG/DL (ref 9–23)
BUN/CREAT SERPL: 25 (ref 10–20)
CALCIUM BLD-MCNC: 7.9 MG/DL (ref 8.7–10.4)
CHLORIDE SERPL-SCNC: 109 MMOL/L (ref 98–112)
CO2 SERPL-SCNC: 25 MMOL/L (ref 21–32)
CREAT BLD-MCNC: 0.64 MG/DL
DEPRECATED RDW RBC AUTO: 50 FL (ref 35.1–46.3)
EGFRCR SERPLBLD CKD-EPI 2021: 82 ML/MIN/1.73M2 (ref 60–?)
EOSINOPHIL # BLD AUTO: 0.06 X10(3) UL (ref 0–0.7)
EOSINOPHIL NFR BLD AUTO: 0.6 %
ERYTHROCYTE [DISTWIDTH] IN BLOOD BY AUTOMATED COUNT: 15.2 % (ref 11–15)
GLUCOSE BLD-MCNC: 150 MG/DL (ref 70–99)
GLUCOSE BLDC GLUCOMTR-MCNC: 159 MG/DL (ref 70–99)
GLUCOSE BLDC GLUCOMTR-MCNC: 159 MG/DL (ref 70–99)
GLUCOSE BLDC GLUCOMTR-MCNC: 172 MG/DL (ref 70–99)
HCT VFR BLD AUTO: 26.1 %
HGB BLD-MCNC: 8.3 G/DL
IMM GRANULOCYTES # BLD AUTO: 0.08 X10(3) UL (ref 0–1)
IMM GRANULOCYTES NFR BLD: 0.8 %
LYMPHOCYTES # BLD AUTO: 0.53 X10(3) UL (ref 1–4)
LYMPHOCYTES NFR BLD AUTO: 5.1 %
MAGNESIUM SERPL-MCNC: 1.6 MG/DL (ref 1.6–2.6)
MCH RBC QN AUTO: 28.3 PG (ref 26–34)
MCHC RBC AUTO-ENTMCNC: 31.8 G/DL (ref 31–37)
MCV RBC AUTO: 89.1 FL
MONOCYTES # BLD AUTO: 0.75 X10(3) UL (ref 0.1–1)
MONOCYTES NFR BLD AUTO: 7.2 %
NEUTROPHILS # BLD AUTO: 9 X10 (3) UL (ref 1.5–7.7)
NEUTROPHILS # BLD AUTO: 9 X10(3) UL (ref 1.5–7.7)
NEUTROPHILS NFR BLD AUTO: 86.1 %
OSMOLALITY SERPL CALC.SUM OF ELEC: 288 MOSM/KG (ref 275–295)
PHOSPHATE SERPL-MCNC: 2.8 MG/DL (ref 2.4–5.1)
PLATELET # BLD AUTO: 260 10(3)UL (ref 150–450)
POTASSIUM SERPL-SCNC: 3.5 MMOL/L (ref 3.5–5.1)
RBC # BLD AUTO: 2.93 X10(6)UL
SODIUM SERPL-SCNC: 137 MMOL/L (ref 136–145)
WBC # BLD AUTO: 10.4 X10(3) UL (ref 4–11)

## 2024-06-07 PROCEDURE — 99239 HOSP IP/OBS DSCHRG MGMT >30: CPT | Performed by: HOSPITALIST

## 2024-06-07 RX ORDER — MAGNESIUM OXIDE 400 MG/1
400 TABLET ORAL ONCE
Status: COMPLETED | OUTPATIENT
Start: 2024-06-07 | End: 2024-06-07

## 2024-06-07 RX ORDER — VANCOMYCIN HYDROCHLORIDE 50 MG/ML
125 KIT ORAL 4 TIMES DAILY
Qty: 105 ML | Refills: 0 | Status: SHIPPED | OUTPATIENT
Start: 2024-06-07 | End: 2024-06-13

## 2024-06-07 RX ORDER — AMOXICILLIN AND CLAVULANATE POTASSIUM 600; 42.9 MG/5ML; MG/5ML
45 POWDER, FOR SUSPENSION ORAL 2 TIMES DAILY
Qty: 84 ML | Refills: 0 | Status: SHIPPED | OUTPATIENT
Start: 2024-06-07 | End: 2024-06-10

## 2024-06-07 RX ORDER — SODIUM BICARBONATE 325 MG/1
325 TABLET ORAL AS NEEDED
Qty: 30 TABLET | Refills: 0 | Status: SHIPPED | OUTPATIENT
Start: 2024-06-07

## 2024-06-07 NOTE — DISCHARGE SUMMARY
[facility]  part of [facility]    Discharge Summary    Joy Jenkins Patient Status:  Inpatient    1931 MRN [id number]   Location [facility] 5SW/SE Attending Steffen Martinez MD   Hosp Day # 3 PCP Igor Field MD     Date of Admission: 2024 Disposition: Home Health Care Services     Date of Discharge: 24    Admitting Diagnosis: Weakness generalized [R53.1]    Hospital Discharge Diagnoses: generalized weakness, dysphagia, dementia, FTT. Aspiration pna, c.diff    Lace+ Score: 73  59-90 High Risk  29-58 Medium Risk  0-28   Low Risk.    TCM Follow-Up Recommendation:  LACE > 58: High Risk of readmission after discharge from the hospital.    Problem List:   Patient Active Problem List   Diagnosis    Routine medical exam    Protein-calorie malnutrition, unspecified severity ([facility])    Cellulitis    Compression fracture of T11 vertebra with routine healing    Moderate late onset Alzheimer's dementia without behavioral disturbance, psychotic disturbance, mood disturbance, or anxiety ([facility])    Closed fracture of left hip, initial encounter ([facility])    Palliative care by specialist    Right leg pain    Dehydration    Neurologic gait dysfunction    Pneumonia of right lower lobe due to infectious organism    At moderate risk for fall    Weakness generalized       Reason for Admission: decreased appetite    Physical Exam:   Vitals:    24 0918   BP: 128/67   Pulse: 89   Resp: 18   Temp: 98.4 °F (36.9 °C)     Gen: NAD AO, frail female  Chest: good air entry CTABL  CVS: normal s1 and s2 RR  Abd: NABS soft NT ND, peg tube in place  Neuro: CN 2-12 grossly intact  Ext: no edema in bilateral LE    History of Present Illness:   Per Dr. Jones  This is a 89 year oldfemale who is brought in by family due to decreased appetite and inability to eat at home.  History from patient extremely limited.  Patient with history of dementia.  Patient AAO times person only.  When asked why patient was at  the hospital she stated she was unsure.  Did not realize she was at the hospital.  Patient denied current pain.  Patient otherwise denied chest pain, shortness of breath, abdominal pain, nausea vomit, fevers or chills.  Further history from patient's daughter and granddaughter at bedside.  Daughter states patient had a fall several weeks prior.  She was diagnosed with a hip fracture by her PCP at that time.  Due to patient's dementia and previous experience with anesthesia, daughter made decision not to pursue further evaluation and did not want surgery.  Daughter reports that since that time, patient's appetite has progressively declined.  Daughter states patient is able to transfer and is up and out of bed at home with assistance.  Daughter states they have brought patient to hospital as they wish to have a feeding tube placed to help with nutrition as their concern is that she is not healing due to poor nutritional status from decreased oral intake.        Hospital Course:   Failure to thrive  Anorexia  Hx of dementia  S/p fall with recent hip fracture  Family wants PEG tube insertion for nutrition > placed 6/5/24 and functioning   Palliative care was consulted   Rec'd PT/OT  Cont PRN pain control  GI was on consult  PEG tube inserted  Continue abx  Palliative care was on consult  Recommend home palliative care with eventual transitioning to hospice  ?aspiration  CXR with patchy right basilar airspace opacity  Okay for pleasure feeds -> recommended DNAR  Cont IV Unasyn x 5 days  Bcx pending -> NGTD  C Diff positive  Started on PO Vancomycin, cont 10 day course    Consultations: GI, Palliative care    Procedures: see above    Complications: none    Discharge Condition: Stable    Discharge Medications:      Discharge Medications        START taking these medications        Instructions Prescription details   amoxicillin-pot clavulanate 600-42.9 mg/5mL Susr  Commonly known as: Augmentin      Take 14 mL (1,680 mg  total) by mouth 2 (two) times daily for 3 days.   Stop taking on: Alisson 10, 2024  Quantity: 84 mL  Refills: 0     pancrelipase (Lip-Prot-Amyl) 79034-57814 units Cpep  Commonly known as: Zenpep      1 capsule (10,000 Units total) by Per G Tube route as needed (to unclog non-patent tubes).   Quantity: 270 capsule  Refills: 0     sodium bicarbonate 325 MG Tabs      Take 1 tablet (325 mg total) by mouth as needed (to unclog non-patent tubes).   Quantity: 30 tablet  Refills: 0     vancomycin 50 mg/mL Solr  Commonly known as: Firvanq      Take 2.5 mL (125 mg total) by mouth 4 (four) times daily for 6 days.   Stop taking on: June 13, 2024  Quantity: 105 mL  Refills: 0            CONTINUE taking these medications        Instructions Prescription details   melatonin 3 MG Tabs  Notes to patient: Resume home schedule       Refills: 0     QUEtiapine 25 MG Tabs  Commonly known as: SEROquel  Notes to patient: Resume home schedule      Take 0.5 tablets (12.5 mg total) by mouth 2 (two) times daily as needed (agitation).   Quantity: 30 tablet  Refills: 2               Where to Get Your Medications        These medications were sent to [place] [facility], [facility] - [place], [place] - [address] [phone], [phone]  [address], [place] [address]      Phone: [phone]   amoxicillin-pot clavulanate 600-42.9 mg/5mL Susr  pancrelipase (Lip-Prot-Amyl) 25314-02528 units Cpep  sodium bicarbonate 325 MG Tabs  vancomycin 50 mg/mL Solr         Greater than 35 minutes spent, >50% spent counseling re: treatment plan and workup     Steffen Martinez MD  6/7/2024

## 2024-06-07 NOTE — CM/SW NOTE
06/07/24 1300   Discharge disposition   Expected discharge disposition Home-Health   Post Acute Care Provider   ([facility])   DME/Infusion Providers [facility]   Discharge transportation [facility]     MARCIO confirmed with Dr. Martinez and RN Shani who stated pt is medically ready for discharge today.     HH Orders were received and attached them via [facility] to [facility].  MARCIO confirmed with Jairon from [facility] that a nurse will be at the patient's home tonight at 7:00 PM.  [facility] made aware of discharge through [facility] Messages.    MARCIO confirmed with Erin from [facility] that tube feeding supplies will be delivered this evening.    MARCIO confirmed that [facility] and [facility] contact information added to AVS.    MARCIO confirmed discharge plan with pt and daughter Daniela at the bedside.    MARCIO scheduled [facility] for 3:30 PM transport.    PLAN: DC home with [facility] and [facility] for home tube feeds.  PCS completed.    Deysi Mendoza MSW, LSW [phone]

## 2024-06-07 NOTE — PLAN OF CARE
Problem: Patient Centered Care  Goal: Patient preferences are identified and integrated in the patient's plan of care  Description: Interventions:  - What would you like us to know as we care for you?   - Provide timely, complete, and accurate information to patient/family  - Incorporate patient and family knowledge, values, beliefs, and cultural backgrounds into the planning and delivery of care  - Encourage patient/family to participate in care and decision-making at the level they choose  - Honor patient and family perspectives and choices  Outcome: Adequate for Discharge     Problem: Patient/Family Goals  Goal: Patient/Family Long Term Goal  Description: Patient's Long Term Goal:     Interventions:  -   - See additional Care Plan goals for specific interventions  Outcome: Adequate for Discharge  Goal: Patient/Family Short Term Goal  Description: Patient's Short Term Goal:     Interventions:   -   - See additional Care Plan goals for specific interventions  Outcome: Adequate for Discharge     Problem: GASTROINTESTINAL - ADULT  Goal: Maintains adequate nutritional intake (undernourished)  Description: INTERVENTIONS:  - Monitor percentage of each meal consumed  - Identify factors contributing to decreased intake, treat as appropriate  - Assist with meals as needed  - Monitor I&O, WT and lab values  - Obtain nutritional consult as needed  - Optimize oral hygiene and moisture  - Encourage food from home; allow for food preferences  - Enhance eating environment  Outcome: Adequate for Discharge     Problem: SKIN/TISSUE INTEGRITY - ADULT  Goal: Skin integrity remains intact  Description: INTERVENTIONS  - Assess and document risk factors for pressure ulcer development  - Assess and document skin integrity  - Monitor for areas of redness and/or skin breakdown  - Initiate interventions, skin care algorithm/standards of care as needed  Outcome: Adequate for Discharge  Goal: Incision(s), wounds(s) or drain site(s) healing  without S/S of infection  Description: INTERVENTIONS:  - Assess and document risk factors for pressure ulcer development  - Assess and document skin integrity  - Assess and document dressing/incision, wound bed, drain sites and surrounding tissue  - Implement wound care per orders  - Initiate isolation precautions as appropriate  - Initiate Pressure Ulcer prevention bundle as indicated  Outcome: Adequate for Discharge     Problem: MUSCULOSKELETAL - ADULT  Goal: Return mobility to safest level of function  Description: INTERVENTIONS:  - Assess patient stability and activity tolerance for standing, transferring and ambulating w/ or w/o assistive devices  - Assist with transfers and ambulation using safe patient handling equipment as needed  - Ensure adequate protection for wounds/incisions during mobilization  - Obtain PT/OT consults as needed  - Advance activity as appropriate  - Communicate ordered activity level and limitations with patient/family  Outcome: Adequate for Discharge     Problem: Impaired Activities of Daily Living  Goal: Achieve highest/safest level of independence in self care  Description: Interventions:  - Assess ability and encourage patient to participate in ADLs to maximize function  - Promote sitting position while performing ADLs such as feeding, grooming, and bathing  - Educate and encourage patient/family in tolerated functional activity level and precautions during self-care    Outcome: Adequate for Discharge     Problem: Impaired Swallowing  Goal: Minimize aspiration risk  Description: Interventions:  - Patient should be alert and upright for all feedings (90 degrees preferred)  - Offer food and liquids at a slow rate  - No straws  - Encourage small bites of food and small sips of liquid  - Offer pills one at a time, crush or deliver with applesauce as needed  - Discontinue feeding and notify MD (or speech pathologist) if coughing or persistent throat clearing or wet/gurgly vocal quality  is noted  Outcome: Adequate for Discharge       Patient discharged home via ambulance. IV removed. Went over discharge paperwork with her and daughter, all questions answered.

## 2024-06-07 NOTE — PLAN OF CARE
Problem: Patient Centered Care  Goal: Patient preferences are identified and integrated in the patient's plan of care  Description: Interventions:  - What would you like us to know as we care for you?   - Provide timely, complete, and accurate information to patient/family  - Incorporate patient and family knowledge, values, beliefs, and cultural backgrounds into the planning and delivery of care  - Encourage patient/family to participate in care and decision-making at the level they choose  - Honor patient and family perspectives and choices  Outcome: Progressing     Problem: GASTROINTESTINAL - ADULT  Goal: Maintains adequate nutritional intake (undernourished)  Description: INTERVENTIONS:  - Monitor percentage of each meal consumed  - Identify factors contributing to decreased intake, treat as appropriate  - Assist with meals as needed  - Monitor I&O, WT and lab values  - Obtain nutritional consult as needed  - Optimize oral hygiene and moisture  - Encourage food from home; allow for food preferences  - Enhance eating environment  Outcome: Progressing     Problem: SKIN/TISSUE INTEGRITY - ADULT  Goal: Skin integrity remains intact  Description: INTERVENTIONS  - Assess and document risk factors for pressure ulcer development  - Assess and document skin integrity  - Monitor for areas of redness and/or skin breakdown  - Initiate interventions, skin care algorithm/standards of care as needed  Outcome: Progressing  Goal: Incision(s), wounds(s) or drain site(s) healing without S/S of infection  Description: INTERVENTIONS:  - Assess and document risk factors for pressure ulcer development  - Assess and document skin integrity  - Assess and document dressing/incision, wound bed, drain sites and surrounding tissue  - Implement wound care per orders  - Initiate isolation precautions as appropriate  - Initiate Pressure Ulcer prevention bundle as indicated  Outcome: Progressing     Problem: MUSCULOSKELETAL - ADULT  Goal:  Return mobility to safest level of function  Description: INTERVENTIONS:  - Assess patient stability and activity tolerance for standing, transferring and ambulating w/ or w/o assistive devices  - Assist with transfers and ambulation using safe patient handling equipment as needed  - Ensure adequate protection for wounds/incisions during mobilization  - Obtain PT/OT consults as needed  - Advance activity as appropriate  - Communicate ordered activity level and limitations with patient/family  Outcome: Progressing     Problem: Impaired Activities of Daily Living  Goal: Achieve highest/safest level of independence in self care  Description: Interventions:  - Assess ability and encourage patient to participate in ADLs to maximize function  - Promote sitting position while performing ADLs such as feeding, grooming, and bathing  - Educate and encourage patient/family in tolerated functional activity level and precautions during self-care  Outcome: Progressing     Problem: Impaired Swallowing  Goal: Minimize aspiration risk  Description: Interventions:  - Patient should be alert and upright for all feedings (90 degrees preferred)  - Offer food and liquids at a slow rate  - No straws  - Encourage small bites of food and small sips of liquid  - Offer pills one at a time, crush or deliver with applesauce as needed  - Discontinue feeding and notify MD (or speech pathologist) if coughing or persistent throat clearing or wet/gurgly vocal quality is noted  Outcome: Progressing   No acute changes, patient in no acute distress, tube feeding ongoing, tolerating well, denies acute pain, denies nausea. No shortness of breath, respirations unlabored. Family at bedside, safety precautions in place

## 2024-06-07 NOTE — DISCHARGE INSTRUCTIONS
Home Tube Feeds:    [facility]     [address]  [place], [place] [address] [phone]     Home Health Care:    [address] [place], [place]  [place], [place] [address] [phone]

## 2024-06-07 NOTE — CM/SW NOTE
MARCIO followed up on discharge planning     MARCIO met with pt and her daughter/HCPOA Daniela at the bedside.    Daniela does want to to bring the pt home.  Daniela confirmed she is comfortable with home tube feeds.    MARCIO explained the home health RN ([facility]) will be there for a teach and train to facilitate the transition to home.      Liaison Jairon from [facility] is aware of the plan.    MARCIO needs MD to document for Medicare coverage:      Patient needs tube feeding for greater than 90 days.    MARCIO spoke to Erin w/[facility].  Pt has been approved through insurance.  MARCIO needs final orders with \"okay to substitute\".    RD Glenis will enter final bolus orders for home.    Per Erin, final orders need to be in by 2 PM for home delivery tonight.    Update 1:00 PM    Order processed.  Erin to reach out to Daniela for home delivery.    [facility] is confirming whether an RN can go out today to facilitate home tube feeds.    Pt will require ambulance transport home.    PLAN: [place] home w/[facility] and [facility] for home tube feeds     / to remain available for support and/or discharge planning.     Deysi Mendoza MSW, LSW [phone]

## 2024-06-10 ENCOUNTER — PATIENT OUTREACH (OUTPATIENT)
Dept: CASE MANAGEMENT | Age: 89
End: 2024-06-10

## 2024-06-10 NOTE — PROGRESS NOTES
Left message on mailbox for pt's dtr Daniela (CRISTAL verified) to call [facility] back for TCM. NCM contact information [phone] included in message.       Discharge Dx:    generalized weakness, dysphagia, dementia, FTT. Aspiration pna, c.diff   6/05/2024 Procedure:    Esophagogastroduodenoscopy with PEG tube insertion

## 2024-06-11 DIAGNOSIS — Z51.5 PALLIATIVE CARE BY SPECIALIST: ICD-10-CM

## 2024-06-11 RX ORDER — QUETIAPINE FUMARATE 25 MG/1
TABLET, FILM COATED ORAL
Qty: 30 TABLET | Refills: 2 | Status: SHIPPED | OUTPATIENT
Start: 2024-06-11

## 2024-06-11 NOTE — TELEPHONE ENCOUNTER
Report noted; placed on Dr. Cedillo's desk for his review. Can be sent to scanning once review is completed. Should be noted there is an acute fracture noted in the inferior RIGHT greater trochanteric region.     CD could not be uploaded. However, images can be viewed, if the \"web viewer\" file is selected directly from CD options on computer. CD also placed on Dr. Cedillo's desk for his review.

## 2024-06-11 NOTE — PROGRESS NOTES
NCM placed call to patient's daughter Daniela (CRISTAL) for TCM, LM requesting a call back to [phone] with a condition update.

## 2024-06-11 NOTE — TELEPHONE ENCOUNTER
Refill Request    Medication request: QUEtiapine 25 MG Oral Tab   Sig:   Take 0.5 tablets (12.5 mg total) by mouth 2 (two) times daily as needed (agitation).     LOV:9/18/2023 Ricky Cedillo MD   Due back to clinic per last office note:  Return in about 1 week (around 9/25/2023   NOV: Visit date not found      [place] 3/17/24 #30    Urine drug screen (if applicable): none on file  Pain contract: none on file

## 2024-06-28 ENCOUNTER — TELEPHONE (OUTPATIENT)
Dept: INTERNAL MEDICINE UNIT | Facility: HOSPITAL | Age: 89
End: 2024-06-28

## 2024-06-28 ENCOUNTER — TELEPHONE (OUTPATIENT)
Dept: FAMILY MEDICINE CLINIC | Facility: CLINIC | Age: 89
End: 2024-06-28

## 2024-06-28 NOTE — TELEPHONE ENCOUNTER
Called Deysi Mendoza from [employer]. Document faxed to hospitalist office not for initial orders, these are follow up orders. Patient's PCP Dr. Field will need to follow up with order for new PEG/G-tube items.    Will call PCP office to discuss next steps.

## 2024-06-28 NOTE — TELEPHONE ENCOUNTER
Called [facility] to discuss next steps. [facility] explained that these are initial orders from June 7th,2024 and they still need a signature from Dr. Steffen Martinez. Will page Dr. Martinez and let him know about the documents.

## 2024-06-28 NOTE — TELEPHONE ENCOUNTER
Called patient's PCP office and discussed with Dinah about what to do with the orders. Patient has not been seen in the office for 3 years. Dr. Field unable to sign orders as patient has not been assessed.     Will reach out to patient to discuss their current care and doctor that can sign their orders.

## 2024-06-28 NOTE — TELEPHONE ENCOUNTER
Received call from hospitalist RN that patient was discharged recently from hospital.     They are requesting follow up home health orders to be signed by MD Dr. Field. MD has not seen patient in office. Last seen by Dr. Jain in 2021.     Advised hospitalist RN to reach out to patient/family to determine who her PCP is.    Clinical supervisor also present during call.

## 2024-06-28 NOTE — TELEPHONE ENCOUNTER
Reached out to patient's daughter Daniela Burks   [phone] who is listed is POA of patient. No answer to call but able to leave voice mail.

## 2024-06-28 NOTE — TELEPHONE ENCOUNTER
Received call from [facility] in regards to patient's initial orders for PEG/G-tube items. Orders document has been faxed to hospitalist office.

## 2024-07-09 ENCOUNTER — MED REC SCAN ONLY (OUTPATIENT)
Dept: PHYSICAL MEDICINE AND REHAB | Facility: CLINIC | Age: 89
End: 2024-07-09

## 2024-07-19 ENCOUNTER — TELEPHONE (OUTPATIENT)
Dept: INTERNAL MEDICINE UNIT | Facility: HOSPITAL | Age: 89
End: 2024-07-19

## 2024-08-01 ENCOUNTER — PATIENT OUTREACH (OUTPATIENT)
Dept: CASE MANAGEMENT | Age: 89
End: 2024-08-01

## 2024-08-01 NOTE — PROCEDURES
The office order for PCP removal request is Approved and finalized on August 1, 2024.    Removed Igor Field MD as the patient's Primary Care Physician

## 2024-08-06 ENCOUNTER — MED REC SCAN ONLY (OUTPATIENT)
Dept: PHYSICAL MEDICINE AND REHAB | Facility: CLINIC | Age: 89
End: 2024-08-06

## 2024-08-15 ENCOUNTER — TELEPHONE (OUTPATIENT)
Dept: PHYSICAL MEDICINE AND REHAB | Facility: CLINIC | Age: 89
End: 2024-08-15

## 2024-08-15 DIAGNOSIS — F02.B0 MODERATE LATE ONSET ALZHEIMER'S DEMENTIA WITHOUT BEHAVIORAL DISTURBANCE, PSYCHOTIC DISTURBANCE, MOOD DISTURBANCE, OR ANXIETY (HCC): ICD-10-CM

## 2024-08-15 DIAGNOSIS — G30.1 MODERATE LATE ONSET ALZHEIMER'S DEMENTIA WITHOUT BEHAVIORAL DISTURBANCE, PSYCHOTIC DISTURBANCE, MOOD DISTURBANCE, OR ANXIETY (HCC): ICD-10-CM

## 2024-08-15 DIAGNOSIS — M80.08XG AGE-RELATED OSTEOPOROSIS WITH CURRENT PATHOLOGICAL FRACTURE, VERTEBRA(E), SUBSEQUENT ENCOUNTER FOR FRACTURE WITH DELAYED HEALING: ICD-10-CM

## 2024-08-15 DIAGNOSIS — R53.1 WEAKNESS GENERALIZED: Primary | ICD-10-CM

## 2024-08-15 NOTE — TELEPHONE ENCOUNTER
[facility] called RN Triage Line stating that the lab orders that were sent to them cannot be fulfilled by them. [facility] let this RN know that they only handle patient's tube feedings and not her lab.     RN sent Lab orders to via RightFax:   Recipient Relationship Method Details   [facility]  Fax [phone]     (See communications tab for details)

## 2024-08-15 NOTE — TELEPHONE ENCOUNTER
This patient is due for more lab work to be done by her [employer] [facility] company.  Orders placed.    Order needs to be faxed to [facility] at [phone] for Racheal Yuan Peptide -plain, 2 cartons per day with a total of 60 per month to be given via G-tube.

## 2024-08-15 NOTE — TELEPHONE ENCOUNTER
All lab orders along with written order for feeding formula were faxed to [facility] at fax#[phone].

## 2024-08-20 DIAGNOSIS — S22.080D COMPRESSION FRACTURE OF T11 VERTEBRA WITH ROUTINE HEALING: Primary | ICD-10-CM

## 2024-08-23 ENCOUNTER — TELEPHONE (OUTPATIENT)
Dept: INTERNAL MEDICINE UNIT | Facility: HOSPITAL | Age: 89
End: 2024-08-23

## 2024-08-23 NOTE — TELEPHONE ENCOUNTER
Received call from [facility] regarding signed order request for tube feed formula. Informed caller order has been faxed four times with confirmation received. Verified correct fax # [phone]. Caller asked to email to [email or web address]. Order emailed as requested.

## 2024-10-01 ENCOUNTER — MED REC SCAN ONLY (OUTPATIENT)
Dept: PHYSICAL MEDICINE AND REHAB | Facility: CLINIC | Age: 89
End: 2024-10-01

## 2024-10-01 ENCOUNTER — TELEPHONE (OUTPATIENT)
Dept: PHYSICAL MEDICINE AND REHAB | Facility: CLINIC | Age: 89
End: 2024-10-01

## 2024-10-11 NOTE — TELEPHONE ENCOUNTER
Mirna from [facility] called to follow up on information requested via fax.  This RN did not see anything from [facility], only from another company.  Unclear if this is same company.  See TE from 8/15/2024-    We do have a request from [facility].  We do not have clinic notes from physician noted at this time. Hold for documentation.

## 2024-10-16 NOTE — TELEPHONE ENCOUNTER
See TE 08/15/2024 for further details. As of writing this note, no records have been documented.

## 2024-10-18 PROBLEM — I35.0 NONRHEUMATIC AORTIC VALVE STENOSIS: Status: RESOLVED | Noted: 2024-01-01 | Resolved: 2024-01-01

## 2024-10-18 PROBLEM — S72.002D CLOSED FRACTURE OF LEFT HIP WITH ROUTINE HEALING: Status: ACTIVE | Noted: 2023-01-01

## 2024-10-18 PROBLEM — I35.8 SYSTOLIC MURMUR OF AORTA: Status: ACTIVE | Noted: 2024-01-01

## 2024-10-18 PROBLEM — I35.0 NONRHEUMATIC AORTIC VALVE STENOSIS: Status: ACTIVE | Noted: 2024-01-01

## 2024-10-18 PROBLEM — S72.114D CLOSED NONDISPLACED FRACTURE OF GREATER TROCHANTER OF RIGHT FEMUR WITH ROUTINE HEALING: Status: ACTIVE | Noted: 2024-01-01

## 2024-10-18 PROBLEM — J69.0 ASPIRATION PNEUMONIA OF RIGHT LOWER LOBE DUE TO REGURGITATED FOOD (HCC): Status: ACTIVE | Noted: 2024-01-01

## 2024-10-18 PROBLEM — L89.152 PRESSURE INJURY OF SACRAL REGION, STAGE 2 (HCC): Status: ACTIVE | Noted: 2024-01-01

## 2024-10-19 NOTE — TELEPHONE ENCOUNTER
Due to the drug interaction between Azithromycin and Quetiapine, she will take Doxycycline 100 mg BID for 5 days.

## 2024-10-21 ENCOUNTER — TELEPHONE (OUTPATIENT)
Dept: PHYSICAL MEDICINE AND REHAB | Facility: CLINIC | Age: 89
End: 2024-10-21

## 2025-01-28 ENCOUNTER — TELEPHONE (OUTPATIENT)
Dept: INTERNAL MEDICINE | Age: OVER 89
End: 2025-01-28

## 2025-05-14 ENCOUNTER — TELEPHONE (OUTPATIENT)
Dept: INTERNAL MEDICINE | Age: OVER 89
End: 2025-05-14

## (undated) DEVICE — GAMMEX® NON-LATEX PI ORTHO SIZE 8, STERILE POLYISOPRENE POWDER-FREE SURGICAL GLOVE: Brand: GAMMEX

## (undated) DEVICE — PACK CDS HIP PINNING

## (undated) DEVICE — DRILL, AO SMALL: Brand: GAMMA

## (undated) DEVICE — K-WIRE

## (undated) DEVICE — KIT ENDO ORCAPOD 160/180/190

## (undated) DEVICE — KIT CLEAN ENDOKIT 1.1OZ GOWNX2

## (undated) DEVICE — APPLICATOR SKIN PREP 26ML HI LT ORNG 2% CHG

## (undated) DEVICE — SUTURE MCRYL SZ 3-0 L27IN ABSRB UD L24MM PS-1

## (undated) DEVICE — 6619 2 PTNT ISO SYS INCISE AREA&LT;(&GT;&&LT;)&GT;P: Brand: STERI-DRAPE™ IOBAN™ 2

## (undated) DEVICE — CONMED SCOPE SAVER BITE BLOCK, 20X27 MM: Brand: SCOPE SAVER

## (undated) DEVICE — SUTURE VCRL SZ 1 L27IN ABSRB VLT L36MM CT-1

## (undated) DEVICE — GAMMEX® PI HYBRID SIZE 7.5, STERILE POWDER-FREE SURGICAL GLOVE, POLYISOPRENE AND NEOPRENE BLEND: Brand: GAMMEX

## (undated) DEVICE — MEDI-VAC NON-CONDUCTIVE SUCTION TUBING 6MM X 1.8M (6FT.) L: Brand: CARDINAL HEALTH

## (undated) DEVICE — DRAIN SPONGES,6 PLY: Brand: EXCILON

## (undated) DEVICE — BINDER ABD UNISX 9IN 45IN SM AND M UNIV

## (undated) DEVICE — BANDAGE COHESIVE W4INXL5YD TAN E POR SLF ADH

## (undated) DEVICE — SUTURE VCRL SZ 2-0 L27IN ABSRB UD L26MM CT-2

## (undated) DEVICE — SUCTION CANISTER, 3000CC,SAFELINER: Brand: DEROYAL

## (undated) DEVICE — PAD PERINL PST FOAM DISP FOR AMSCO SURG TBL

## (undated) DEVICE — SOLUTION IRRIG 1000ML 0.9% NACL USP BTL

## (undated) DEVICE — DRESSING HYDRCOLL W3.25XL6IN TRNSLUC

## (undated) DEVICE — YANKAUER,BULB TIP,W/O VENT,RIGID,STERILE: Brand: MEDLINE

## (undated) DEVICE — FOAM LIMB HOLDER: Brand: POSEY

## (undated) DEVICE — Device: Brand: DUAL NARE NASAL CANNULAE FEMALE LUER CON 7FT O2 TUBE

## (undated) NOTE — LETTER
Joy Jenkins  Patient Summary  MRN: [id number] Description: Female : 1931     Comp Metabolic Panel (14) (Order [id number])  LAB  Date: 2024 Department: [facility] Ordering/Authorizing: Ricky Cedillo MD     Comp Metabolic Panel (14) [[id number]]    Awaiting signature from: Ricky Cedillo MD Status: Active   Mode: Ordering in Verbal with readback mode Communicated by: Karen Hernandez RN   Ordering user: Karen Hernandez RN 24 1159 Ordering provider: Ricky Cedillo MD   Authorized by: Ricky Cedillo MD   Frequency:  24 -   Released by: Karen Hernandez RN 24 1159   Diagnoses  Pneumonia of right lower lobe due to infectious organism [J18.9]   Order comments: Home Health to set up     Collection Information    Blood        Resulting Agency: [facility]         Order History  Outpatient  Date/Time Action Taken User Additional Information   24 1038 Pend Karen Hernandez RN    24 1153 Pend Karen Hernandez RN    24 1159 Sign Karen Hernandez RN Ordering Mode: Verbal with readback     Order Details    Frequency Duration Priority Order Class   None None Routine Normal     Diagnoses     Codes Comments   Pneumonia of right lower lobe due to infectious organism J18.9             Comments    Home Health to set up                      Verbal Order Info    Action Created on Order Mode Entered by Responsible Provider Signed by Signed on   Ordering 24 1159 Verbal with readback Karen Hernandez RN Couri, Brian A, MD Couri, Brian A, MD 2024 1159           Associated Diagnoses    Pneumonia of right lower lobe due to infectious organism [J18.9]  - Primary

## (undated) NOTE — LETTER
[address]  Authorization for Surgical Operation and Procedure                                                                                           I hereby authorize Roro Whalen MD, my physician and his/her assistants (if applicable), which may include medical students, residents, and/or fellows, to perform the following surgical operation/ procedure and administer such anesthesia as may be determined necessary by my physician: Operation/Procedure name (s) LEFT HIP INTRAMEDULLARY NAIL on Erbwilman Clore   2. I recognize that during the surgical operation/procedure, unforeseen conditions may necessitate additional or different procedures than those listed above. I, therefore, further authorize and request that the above-named surgeon, assistants, or designees perform such procedures as are, in their judgment, necessary and desirable. 3.   My surgeon/physician has discussed prior to my surgery the potential benefits, risks and side effects of this procedure; the likelihood of achieving goals; and potential problems that might occur during recuperation. They also discussed reasonable alternatives to the procedure, including risks, benefits, and side effects related to the alternatives and risks related to not receiving this procedure. I have had all my questions answered and I acknowledge that no guarantee has been made as to the result that may be obtained. 4.   Should the need arise during my operation/procedure, which includes change of level of care prior to discharge, I also consent to the administration of blood and/or blood products. Further, I understand that despite careful testing and screening of blood or blood products by collecting agencies, I may still be subject to ill effects as a result of receiving a blood transfusion and/or blood products.   The following are some, but not all, of the potential risks that can occur: fever and allergic reactions, hemolytic reactions, transmission of diseases such as Hepatitis, AIDS and Cytomegalovirus (CMV) and fluid overload. In the event that I wish to have an autologous transfusion of my own blood, or a directed donor transfusion, I will discuss this with my physician. Check only if Refusing Blood or Blood Products  I understand refusal of blood or blood products as deemed necessary by my physician may have serious consequences to my condition to include possible death. I hereby assume responsibility for my refusal and release the hospital, its personnel, and my physicians from any responsibility for the consequences of my refusal.    o  Refuse   5. I authorize the use of any specimen, organs, tissues, body parts or foreign objects that may be removed from my body during the operation/procedure for diagnosis, research or teaching purposes and their subsequent disposal by hospital authorities. I also authorize the release of specimen test results and/or written reports to my treating physician on the hospital medical staff or other referring or consulting physicians involved in my care, at the discretion of the Pathologist or my treating physician. 6.   I consent to the photographing or videotaping of the operations or procedures to be performed, including appropriate portions of my body for medical, scientific, or educational purposes, provided my identity is not revealed by the pictures or by descriptive texts accompanying them. If the procedure has been photographed/videotaped, the surgeon will obtain the original picture, image, videotape or CD. The hospital will not be responsible for storage, release or maintenance of the picture, image, tape or CD.    7.   I consent to the presence of a  or observers in the operating room as deemed necessary by my physician or their designees.     8.   I recognize that in the event my procedure results in extended X-Ray/fluoroscopy time, I may develop a skin reaction. 9. If I have a Do Not Attempt Resuscitation (DNAR) order in place, that status will be suspended while in the operating room, procedural suite, and during the recovery period unless otherwise explicitly stated by me (or a person authorized to consent on my behalf). The surgeon or my attending physician will determine when the applicable recovery period ends for purposes of reinstating the DNAR order. 10. Patients having a sterilization procedure: I understand that if the procedure is successful the results will be permanent and it will therefore be impossible for me to inseminate, conceive, or bear children. I also understand that the procedure is intended to result in sterility, although the result has not been guaranteed. 11. I acknowledge that my physician has explained sedation/analgesia administration to me including the risk and benefits I consent to the administration of sedation/analgesia as may be necessary or desirable in the judgment of my physician. I CERTIFY THAT I HAVE READ AND FULLY UNDERSTAND THE ABOVE CONSENT TO OPERATION and/or OTHER PROCEDURE.     _________________________________________ _________________________________     ___________________________________  Signature of Patient     Signature of Responsible Person                   Printed Name of Responsible Person                              _________________________________________ ______________________________        ___________________________________  Signature of Witness         Date  Time         Relationship to Patient    STATEMENT OF PHYSICIAN My signature below affirms that prior to the time of the procedure; I have explained to the patient and/or his/her legal representative, the risks and benefits involved in the proposed treatment and any reasonable alternative to the proposed treatment.  I have also explained the risks and benefits involved in refusal of the proposed treatment and alternatives to the proposed treatment and have answered the patient's questions.  If I have a significant financial interest in a co-management agreement or a significant financial interest in any product or implant, or other significant relationship used in this procedure/surgery, I have disclosed this and had a discussion with my patient.     _______________________________________________________________ _____________________________  Natalya Lyons Physician)                                                                                         (Date)                                   (Time)  Patient Name: Teresa Zhong    : 1931   Printed: 2023      Medical Record #: [id number]                                              Page 1 of 1

## (undated) NOTE — LETTER
Joy ROGERS Jenkins  Patient Summary  MRN: [id number] Description: Female : 1931     CBC With Differential With Platelet (Order [id number])  LAB  Date: 2024 Department: [facility] Ordering/Authorizing: Ricky Cedillo MD     Linked Results    Procedure Abnormality Status   CBC With Differential With Platelet       CBC With Differential With Platelet [[id number]]    Awaiting signature from: Ricky Cedillo MD Status: Active   Mode: Ordering in Verbal with readback mode Communicated by: Karen Hernandez RN   Ordering user: Karen Hernandez RN 24 1159 Ordering provider: Ricky Cedillo MD   Authorized by: Ricky Cedillo MD   Frequency:  24 -   Released by: Karen Hernandez RN 24 1159   Diagnoses  Pneumonia of right lower lobe due to infectious organism [J18.9]   Order comments: Home Health to set up                     Order History  Outpatient  Date/Time Action Taken User Additional Information   24 1038 [place]end Karen Hernandez RN    24 1153 [place]end Karen Hernandez RN    24 1159 Sign Karen Hernandez RN Ordering Mode: Verbal with readback           Order Details    Frequency Duration Priority Order Class   None None Routine Normal       Diagnoses     Codes Comments   Pneumonia of right lower lobe due to infectious organism J18.9               Comments    Home Health to set up                      Verbal Order Info    Action Created on Order Mode Entered by Responsible Provider Signed by Signed on   Ordering 24 1159 Verbal with readback Karen Hernandez RN Couri, Brian A, MD Couri, Brian A, MD 2024 1159           Associated Diagnoses    Pneumonia of right lower lobe due to infectious organism [J18.9]  - Primary

## (undated) NOTE — [place] [address]  Authorization for Invasive Procedures  Date: 6/4/2024           Time: 2021    I hereby authorize Dr. Vadim Nunes, my physician and his/her assistants (if applicable), which may include medical students, residents, and/or fellows, to perform the following surgical operation/ procedure and administer such anesthesia as may be determined necessary by my physician: esophagogastroduodenoscopy and percutaneous endoscopic gastrostomy tube insertion on Joy A Jenkins  2.   I recognize that during the surgical operation/procedure, unforeseen conditions may necessitate additional or different procedures than those listed above.  I, therefore, further authorize and request that the above-named surgeon, assistants, or designees perform such procedures as are, in their judgment, necessary and desirable.    3.   My surgeon/physician has discussed prior to my surgery the potential benefits, risks and side effects of this procedure; the likelihood of achieving goals; and potential problems that might occur during recuperation.  They also discussed reasonable alternatives to the procedure, including risks, benefits, and side effects related to the alternatives and risks related to not receiving this procedure.  I have had all my questions answered and I acknowledge that no guarantee has been made as to the result that may be obtained.    4.   Should the need arise during my operation/procedure, which includes change of level of care prior to discharge, I also consent to the administration of blood and/or blood products.  Further, I understand that despite careful testing and screening of blood or blood products by collecting agencies, I may still be subject to ill effects as a result of receiving a blood transfusion and/or blood products.  The following are some, but not all, of the potential risks that can occur: fever and allergic reactions, hemolytic reactions, transmission of  diseases such as Hepatitis, AIDS and Cytomegalovirus (CMV) and fluid overload.  In the event that I wish to have an autologous transfusion of my own blood, or a directed donor transfusion, I will discuss this with my physician.   Check only if Refusing Blood or Blood Products  I understand refusal of blood or blood products as deemed necessary by my physician may have serious consequences to my condition to include possible death. I hereby assume responsibility for my refusal and release the hospital, its personnel, and my physicians from any responsibility for the consequences of my refusal.         o  Refuse         5.   I authorize the use of any specimen, organs, tissues, body parts or foreign objects that may be removed from my body during the operation/procedure for diagnosis, research or teaching purposes and their subsequent disposal by hospital authorities.  I also authorize the release of specimen test results and/or written reports to my treating physician on the hospital medical staff or other referring or consulting physicians involved in my care, at the discretion of the Pathologist or my treating physician.    6.   I consent to the photographing or videotaping of the operations or procedures to be performed, including appropriate portions of my body for medical, scientific, or educational purposes, provided my identity is not revealed by the pictures or by descriptive texts accompanying them.  If the procedure has been photographed/videotaped, the surgeon will obtain the original picture, image, videotape or CD.  The hospital will not be responsible for storage, release or maintenance of the picture, image, tape or CD.    7.   I consent to the presence of a  or observers in the operating room as deemed necessary by my physician or their designees.    8.   I recognize that in the event my procedure results in extended X-Ray/fluoroscopy time, I may develop a skin reaction.    9. If I  have a Do Not Attempt Resuscitation (DNAR) order in place, that status will be suspended while in the operating room, procedural suite, and during the recovery period unless otherwise explicitly stated by me (or a person authorized to consent on my behalf). The surgeon or my attending physician will determine when the applicable recovery period ends for purposes of reinstating the DNAR order.  10. Patients having a sterilization procedure: I understand that if the procedure is successful the results will be permanent and it will therefore be impossible for me to inseminate, conceive, or bear children.  I also understand that the procedure is intended to result in sterility, although the result has not been guaranteed.   11. I acknowledge that my physician has explained sedation/analgesia administration to me including the risk and benefits I consent to the administration of sedation/analgesia as may be necessary or desirable in the judgment of my physician.    I CERTIFY THAT I HAVE READ AND FULLY UNDERSTAND THE ABOVE CONSENT TO OPERATION and/or OTHER PROCEDURE.        ____________________________________       _________________________________      ______________________________  Signature of Patient         Signature of Responsible Person        Printed Name of Responsible Person        ____________________________________      _________________________________      ______________________________       Signature of Witness          Relationship to Patient                       Date                                       Time  Patient Name: Joy ROGERS Alice  : 1931    Reviewed: 2024   Printed: 2024  Medical Record #: [id number] Page 1 of 2             STATEMENT OF PHYSICIAN My signature below affirms that prior to the time of the procedure; I have explained to the patient and/or his/her legal representative, the risks and benefits involved in the proposed treatment and any reasonable alternative to the  proposed treatment. I have also explained the risks and benefits involved in refusal of the proposed treatment and alternatives to the proposed treatment and have answered the patient's questions. If I have a significant financial interest in a co-management agreement or a significant financial interest in any product or implant, or other significant relationship used in this procedure/surgery, I have disclosed this and had a discussion with my patient.     _______________________________________________________________ _____________________________  (Signature of Physician)                                                                                         (Date)                                   (Time)  Patient Name: Joy ROGERS Alice  : 1931    Reviewed: 2024   Printed: 2024  Medical Record #: [id number] Page 2 of 2

## (undated) NOTE — LETTER
9/26/2023      Zunilda Chapa MD  Physical Medicine and Rehabilitation  [facility], [address] [place] [address]  Dept: [phone]  Dept Fax: [phone]        RE: Consultation for Lois Child        Dear Taqueria Zendejas [facility], DO,    Thank you very much for the opportunity to see your patient. Attached please find a summary from your patient's recent visit. I appreciate the chance to take care of your patient with you. Please feel free to call me with any questions or concerns. Sincerely,        Spencer Figueroa.  Vlad Chapa MD  Electronically Signed on 9/26/2023

## (undated) NOTE — LETTER
[facility]  [address], [place], [place]    Authorization for Surgical Operation and Procedure                               I hereby authorize Vadim Nunes MD, my physician and his/her assistants (if applicable), which may include medical students, residents, and/or fellows, to perform the following surgical operation/ procedure and administer such anesthesia as may be determined necessary by my physician: Operation/Procedure name (s) PERCUTANEOUS ENDOSCOPIC GASTROSTOMY PLACEMENT/JEJUNOSTOMY TUBE PLACEMENT on Joy A Jenkins   2.   I recognize that during the surgical operation/procedure, unforeseen conditions may necessitate additional or different procedures than those listed above.  I, therefore, further authorize and request that the above-named surgeon, assistants, or designees perform such procedures as are, in their judgment, necessary and desirable.    3.   My surgeon/physician has discussed prior to my surgery the potential benefits, risks and side effects of this procedure; the likelihood of achieving goals; and potential problems that might occur during recuperation.  They also discussed reasonable alternatives to the procedure, including risks, benefits, and side effects related to the alternatives and risks related to not receiving this procedure.  I have had all my questions answered and I acknowledge that no guarantee has been made as to the result that may be obtained.    4.   Should the need arise during my operation/procedure, which includes change of level of care prior to discharge, I also consent to the administration of blood and/or blood products.  Further, I understand that despite careful testing and screening of blood or blood products by collecting agencies, I may still be subject to ill effects as a result of receiving a blood transfusion and/or blood products.  The following are some, but not all, of the potential risks that can occur: fever and allergic reactions,  hemolytic reactions, transmission of diseases such as Hepatitis, AIDS and Cytomegalovirus (CMV) and fluid overload.  In the event that I wish to have an autologous transfusion of my own blood, or a directed donor transfusion, I will discuss this with my physician.  Check only if Refusing Blood or Blood Products  I understand refusal of blood or blood products as deemed necessary by my physician may have serious consequences to my condition to include possible death. I hereby assume responsibility for my refusal and release the hospital, its personnel, and my physicians from any responsibility for the consequences of my refusal.    o  Refuse   5.   I authorize the use of any specimen, organs, tissues, body parts or foreign objects that may be removed from my body during the operation/procedure for diagnosis, research or teaching purposes and their subsequent disposal by hospital authorities.  I also authorize the release of specimen test results and/or written reports to my treating physician on the hospital medical staff or other referring or consulting physicians involved in my care, at the discretion of the Pathologist or my treating physician.    6.   I consent to the photographing or videotaping of the operations or procedures to be performed, including appropriate portions of my body for medical, scientific, or educational purposes, provided my identity is not revealed by the pictures or by descriptive texts accompanying them.  If the procedure has been photographed/videotaped, the surgeon will obtain the original picture, image, videotape or CD.  The hospital will not be responsible for storage, release or maintenance of the picture, image, tape or CD.    7.   I consent to the presence of a  or observers in the operating room as deemed necessary by my physician or their designees.    8.   I recognize that in the event my procedure results in extended X-Ray/fluoroscopy time, I may develop a  skin reaction.    9. If I have a Do Not Attempt Resuscitation (DNAR) order in place, that status will be suspended while in the operating room, procedural suite, and during the recovery period unless otherwise explicitly stated by me (or a person authorized to consent on my behalf). The surgeon or my attending physician will determine when the applicable recovery period ends for purposes of reinstating the DNAR order.  10. Patients having a sterilization procedure: I understand that if the procedure is successful the results will be permanent and it will therefore be impossible for me to inseminate, conceive, or bear children.  I also understand that the procedure is intended to result in sterility, although the result has not been guaranteed.   11. I acknowledge that my physician has explained sedation/analgesia administration to me including the risk and benefits I consent to the administration of sedation/analgesia as may be necessary or desirable in the judgment of my physician.    I CERTIFY THAT I HAVE READ AND FULLY UNDERSTAND THE ABOVE CONSENT TO OPERATION and/or OTHER PROCEDURE.     ____________________________________  _________________________________        ______________________________  Signature of Patient    Signature of Responsible Person                Printed Name of Responsible Person                                      ____________________________________  _____________________________                ________________________________  Signature of Witness        Date  Time         Relationship to Patient    STATEMENT OF PHYSICIAN My signature below affirms that prior to the time of the procedure; I have explained to the patient and/or his/her legal representative, the risks and benefits involved in the proposed treatment and any reasonable alternative to the proposed treatment. I have also explained the risks and benefits involved in refusal of the proposed treatment and alternatives to the  proposed treatment and have answered the patient's questions. If I have a significant financial interest in a co-management agreement or a significant financial interest in any product or implant, or other significant relationship used in this procedure/surgery, I have disclosed this and had a discussion with my patient.     _____________________________________________________              _____________________________  (Signature of Physician)                                                                                         (Date)                                   (Time)  Patient Name: Joy KEN Jenkins      : 1931      Printed: 2024     Medical Record #: [id number]                                      Page 1 of 1

## (undated) NOTE — LETTER
8/15/2024  [facility]     Account #:   Order Date: 8/15/2024           EPIC ORD #:   Order Time:  9:37 AM                 Client / Ordering Site Information: Physician Information:   Account Name:     Address 1:     Address 2:     City, State Zip:     Phone:     Fax:      Ordering: Ricky Cedillo   Degree: MD   NPI: [id number]   UPIN:     Physician ID:            Patient Information:   Name: OBINNA TRAN   Legal Sex: Female   SSN: [id number]   Patient ID: [id number]    YOB: 1931 (89 years)   Phone: [phone]   Address: [address] [place] [address]                          Prim. Insurance: [place]. [place]   Ins Code: [place]   Plan: [place] B [place]   Address: ATTN CLAIMS  [address] [place] [id number] Address: [id number] [place] [phone]   Policy #: [id number] Group #:  Policy #: [id number] Group #: [id number]            Order: Vitamin D ()   Order Priority: Routine   Order class: Normal   Specimen source:   Comments:   Status: Future  Count: 1   Interval:   Questions:   Please pick the scenario that best fits the purpose for ordering this test: General Screening/Vit D deficiency (25-Hydroxy)  Release to patient: Immediate                   Responsible Party / Guarantor Information:   Name: OBINNA TRAN   Address: [address] [place] [address] Zip: [place] [address]   Phone: [phone]   Relation to Pt: Self   Employer Name:            ABN:      Worker's Comp: N Date of Injury:               Diagnosis Codes:   R53.1 G30.1 F02.B0 M80.08XG           Electronically Signed By: Ricky Cedillo MD [NPI: [id number]]  Order Date: Aug 15, 2024 at 9:37 AM             [facility]     Account #:   Order Date: 8/15/2024           EPIC ORD #:   Order Time:  9:37 AM                 Client / Ordering Site  Information: Physician Information:   Account Name:     Address 1:     Address 2:     City, State Zip:     Phone:     Fax:      Ordering: Ricky Cedillo   Degree: MD   NPI: [id number]   UPIN:     Physician ID:            Patient Information:   Name: OBINNA TRAN   Legal Sex: Female   SSN: [id number]   Patient ID: [id number]    YOB: 1931 (89 years)   Phone: [phone]   Address: [address] [place] [address]                          Prim. Insurance: [place]. [place]   Ins Code: [place]   Plan: MEDIC[place] PART B ONLY [place]   Address: ATTN CLAIMS  [address]  [facility] [address] Address: [id number] [place] [phone]   Policy #: [id number] Group #:  Policy #: [id number] Group #: [id number]            Order: Vitamin B12 (B12)   Order Priority: Routine   Order class: Normal   Specimen source:   Comments:   Status:   Count:    Interval:   Questions:                          Responsible Party / Guarantor Information:   Name: OBINNA TRAN   Address: [address] [place] [address] Zip: [place] [address]   Phone: [phone]   Relation to Pt: Self   Employer Name:            ABN:      Worker's Comp: N Date of Injury:               Diagnosis Codes:   R53.1 G30.1 F02.B0             Electronically Signed By: Ricky Cedillo MD [NPI: [id number]]  Order Date: Aug 15, 2024 at 9:37 AM    [facility]   [facility]     Account #:   Order Date: 8/15/2024           EPIC ORD #:   Order Time:  9:37 AM                    Client / Ordering Site Information: Physician Information:   Account Name:     Address 1:     Address 2:     City, State Zip:     Phone:     Fax:      Ordering: Ricky Cedillo   Degree: MD   NPI: [id number]   UPIN:     Physician ID:            Patient Information:   Name: OBINNA TRAN   Legal Sex: Female   SSN: [id number]   Patient ID: [id number]    YOB: 1931 (89 years)    Phone: [phone]   Address: [address] [place] [address]                          Prim. Insurance: [place]. [place]   Ins Code: [place]   Plan: [place] B ONLY [place]   Address: ATTN CLAIMS  [address] [place] [address] [place] [address] Address: [address] [place] [phone]   Policy #: [id number] Group #:  Policy #: [id number] Group #: [id number]            Order: Folic Acid Serum (Folate) (FOL)   Order Priority: Routine   Order class: Normal   Specimen source:   Comments:   Status:   Count:    Interval:   Questions:                          Responsible Party / Guarantor Information:   Name: OBINNA TRAN   Address: [address] [place] [address] Zip: [place] [address]   Phone: [phone]   Relation to Pt: Self   Employer Name:            ABN:      Worker's Comp: N Date of Injury:               Diagnosis Codes:   R53.1 G30.1 F02.B0             Electronically Signed By: Ricky Cedillo MD [NPI: [id number]]  Order Date: Aug 15, 2024 at 9:37 AM    [facility]     Account #:   Order Date: 8/15/2024           EPIC ORD #:   Order Time:  9:37 AM                 Client / Ordering Site Information: Physician Information:   Account Name:     Address 1:     Address 2:     City, State Zip:     Phone:     Fax:      Ordering: Ricky Cedillo   Degree: MD   NPI: [id number]   UPIN:     Physician ID:            Patient Information:   Name: OBINNA TRAN   Legal Sex: Female   SSN: [id number]   Patient ID: [id number]    YOB: 1931 (89 years)   Phone: [phone]   Address: [address] [place] [address]                          Prim. Insurance: [place]. [place]   Ins Code: [place]   Plan: MED[place]ARE [place] B ONLY [place]   Address: ATTN CLAIMS  [address] [place] [address] [place] [address] Address: [address] [place] [phone]    Policy #: [id number] Group #:  Policy #: [id number] Group #: [id number]            Order: Comp Metabolic Panel (14) (CMP)   Order Priority: Routine   Order class: Normal   Specimen source:   Comments:   Status:   Count:    Interval:   Questions:                          Responsible Party / Guarantor Information:   Name: OBINNA TRAN   Address: [address] [place] [address] Zip: [place] [address]   Phone: [phone]   Relation to Pt: Self   Employer Name:            ABN:      Worker's Comp: N Date of Injury:               Diagnosis Codes:   R53.1 G30.1 F02.B0             Electronically Signed By: Ricky Cedillo MD [NPI: [id number]]  Order Date: Aug 15, 2024 at 9:37 AM    [facility]   [facility]     Account #:   Order Date: 8/15/2024           EPIC ORD #:   Order Time:  9:37 AM                 Client / Ordering Site Information: Physician Information:   Account Name:     Address 1:     Address 2:     City, State Zip:     Phone:     Fax:      Ordering: Ricky Cedillo   Degree: MD   NPI: [id number]   UPIN:     Physician ID:            Patient Information:   Name: OBINNA TRAN   Legal Sex: Female   SSN: [id number]   Patient ID: [id number]    YOB: 1931 (89 years)   Phone: [phone]   Address: [address] [place] [address]                          Prim. Insurance: [place]. [place]   Ins Code: [place]   Plan: [place] B [place]   Address: ATTN CLAIMS  [address]  [facility] [address] Address: [address]  [facility] [address]   Policy #: [id number] Group #:  Policy #: [id number] Group #: [id number]      Order: CBC With Differential With Platelet (CBC)   Order Priority: Routine   Order class: Normal   Specimen source:   Comments:   Status:   Count:    Interval:   Questions:                    Responsible Party / Guarantor Information:   Name: OBINNA TRAN   Address: [address] [place] [address]  [address]VE[address]   [facility] Zip: [place] [address]   Phone: [phone]   Relation to Pt: Self   Employer Name:            ABN:      Worker's Comp: N Date of Injury:               Diagnosis Codes:   R53.1 G30.1 F02.B0             Electronically Signed By: Ricky Cedillo MD [NPI: [id number]]  Order Date: Aug 15, 2024 at 9:37 AM

## (undated) NOTE — LETTER
[facility]     Account #:   Order Date: 8/15/2024           EPIC ORD #:   Order Time:  9:37 AM           Client / Ordering Site Information: Physician Information:   Account Name:     Address 1:     Address 2:     City, State Zip:     Phone:     Fax:      Ordering: Ricky Cedillo   Degree: MD   NPI: [id number]   UPIN:     Physician ID:           Patient Information:   Name: OBINNA TRAN   Legal Sex: Female   SSN: [id number]   Patient ID: [id number]    YOB: 1931 (89 years)   Phone: [phone]   Address: [address] [place] [address]                   Prim. Insurance: [place]. [place]   Ins Code: [place]   Plan: MED[place]ARE [place] B [place]   Address: ATTN CLAIMS  [address] [place] [phone] Address: [id number] [place] [phone]   Policy #: [id number] Group #:  Policy #: [id number] Group #: [id number]          Order: Comp Metabolic Panel (14) (CMP)   Order Priority: Routine   Order class: Normal   Specimen source:   Comments:   Status:   Count:    Interval:   Questions:             Responsible Party / Guarantor Information:   Name: OBINNA TRAN   Address: [address] [place] [address] Zip: [place] [address]   Phone: [phone]   Relation to Pt: Self   Employer Name:           ABN:      Worker's Comp: N Date of Injury:              Diagnosis Codes:   R53.1 G30.1 F02.B0             Electronically Signed By: Ricky Cedillo MD [NPI: [id number]]  Order Date: Aug 15, 2024 at 9:37 AM

## (undated) NOTE — LETTER
[address], [place]  Authorization for Invasive Procedures  Date: 12/21/2023           Time: 1900    I hereby authorize , my physician and his/her assistants (if applicable), which may include medical students, residents, and/or fellows, to perform the following surgical operation/ procedure and administer such anesthesia as may be determined necessary by my physician: Hip/femur intramedullary Nail  on Ceola Arn  2. I recognize that during the surgical operation/procedure, unforeseen conditions may necessitate additional or different procedures than those listed above. I, therefore, further authorize and request that the above-named surgeon, assistants, or designees perform such procedures as are, in their judgment, necessary and desirable. 3.   My surgeon/physician has discussed prior to my surgery the potential benefits, risks and side effects of this procedure; the likelihood of achieving goals; and potential problems that might occur during recuperation. They also discussed reasonable alternatives to the procedure, including risks, benefits, and side effects related to the alternatives and risks related to not receiving this procedure. I have had all my questions answered and I acknowledge that no guarantee has been made as to the result that may be obtained. 4.   Should the need arise during my operation/procedure, which includes change of level of care prior to discharge, I also consent to the administration of blood and/or blood products. Further, I understand that despite careful testing and screening of blood or blood products by collecting agencies, I may still be subject to ill effects as a result of receiving a blood transfusion and/or blood products.   The following are some, but not all, of the potential risks that can occur: fever and allergic reactions, hemolytic reactions, transmission of diseases such as Hepatitis, AIDS and Cytomegalovirus (CMV) and fluid overload. In the event that I wish to have an autologous transfusion of my own blood, or a directed donor transfusion, I will discuss this with my physician. Check only if Refusing Blood or Blood Products  I understand refusal of blood or blood products as deemed necessary by my physician may have serious consequences to my condition to include possible death. I hereby assume responsibility for my refusal and release the hospital, its personnel, and my physicians from any responsibility for the consequences of my refusal.         o  Refuse         5. I authorize the use of any specimen, organs, tissues, body parts or foreign objects that may be removed from my body during the operation/procedure for diagnosis, research or teaching purposes and their subsequent disposal by hospital authorities. I also authorize the release of specimen test results and/or written reports to my treating physician on the hospital medical staff or other referring or consulting physicians involved in my care, at the discretion of the Pathologist or my treating physician. 6.   I consent to the photographing or videotaping of the operations or procedures to be performed, including appropriate portions of my body for medical, scientific, or educational purposes, provided my identity is not revealed by the pictures or by descriptive texts accompanying them. If the procedure has been photographed/videotaped, the surgeon will obtain the original picture, image, videotape or CD. The hospital will not be responsible for storage, release or maintenance of the picture, image, tape or CD.    7.   I consent to the presence of a  or observers in the operating room as deemed necessary by my physician or their designees. 8.   I recognize that in the event my procedure results in extended X-Ray/fluoroscopy time, I may develop a skin reaction. 9.  If I have a Do Not Attempt Resuscitation (DNAR) order in place, that status will be suspended while in the operating room, procedural suite, and during the recovery period unless otherwise explicitly stated by me (or a person authorized to consent on my behalf). The surgeon or my attending physician will determine when the applicable recovery period ends for purposes of reinstating the DNAR order. 10. Patients having a sterilization procedure: I understand that if the procedure is successful the results will be permanent and it will therefore be impossible for me to inseminate, conceive, or bear children. I also understand that the procedure is intended to result in sterility, although the result has not been guaranteed. 11. I acknowledge that my physician has explained sedation/analgesia administration to me including the risk and benefits I consent to the administration of sedation/analgesia as may be necessary or desirable in the judgment of my physician. I CERTIFY THAT I HAVE READ AND FULLY UNDERSTAND THE ABOVE CONSENT TO OPERATION and/or OTHER PROCEDURE.        ____________________________________       _________________________________      ______________________________  Signature of Patient         Signature of Responsible Person        Printed Name of Responsible Person        ____________________________________      _________________________________      ______________________________       Signature of Witness          Relationship to Patient                       Date                                       Time    Patient Name: Raymundo Ji     : 1931                 Printed: 2023      Medical Record #: [id number]                      Page 1 of 2          [place] My signature below affirms that prior to the time of the procedure; I have explained to the patient and/or his/her legal representative, the risks and benefits involved in the proposed treatment and any reasonable alternative to the proposed treatment.  I have also explained the risks and benefits involved in refusal of the proposed treatment and alternatives to the proposed treatment and have answered the patient's questions.  If I have a significant financial interest in a co-management agreement or a significant financial interest in any product or implant, or other significant relationship used in this procedure/surgery, I have disclosed this and had a discussion with my patient.     _______________________________________________________________ _____________________________  Ade Brooks Physician)                                                                                         (Date)                                   (Time)    Patient Name: Lasha Palafox     : 1931                 Printed: 2023      Medical Record #: [id number]                      Page 2 of 2

## (undated) NOTE — IP AVS SNAPSHOT
Patient Demographics     Address  [facility] [address] [place] [address] Phone  [phone] (Home)  [phone] (Mobile) *Preferred* E-mail Address  [email or web address]      Patient Contacts     Name Relation Home Work Mobile    Laura Olvera Daughter [phone]    Daniela Burks Daughter [phone]      Allergies as of 6/7/2024  Review status set to In Progress on 6/5/2024       Noted Reaction Type Reactions    Bactrim Ds 09/11/2013    OTHER (SEE COMMENTS)    Ciprofloxacin 04/12/2019    OTHER (SEE COMMENTS)    Penicillins 04/12/2019    OTHER (SEE COMMENTS)    Tolerate amoxicillin    Sulfamethoxazole W/trimethoprim 04/12/2019    OTHER (SEE COMMENTS)      Code Status Information     Code Status    Full Code        Patient Instructions       Home Tube Feeds:    [facility]     [address] [place] [address] [address]  [place] [address] [phone]     Home Health Care:    [address] [facility]  [place], [place] [address] [phone]       Patient Isolation Status     Isolation Added    Enteric/Contact PLUS 06/02/24      Microbiology Results (All)     Procedure Component Value Units Date/Time    Blood Culture [[id number]] Collected: 06/03/24 1244    Order Status: Completed Lab Status: Preliminary result Updated: 06/07/24 1501    Specimen: Blood,peripheral      Blood Culture Result No Growth 4 Days    Narrative:      Aerobic Bottle Volume - 2 mL    Comment -  Less than optimal blood volume collected, which may yield a false-negative result. Adequate volume is correlated with increased recovery rates. Lower volumes may adversely affect recovery and/or detection times. Clinical correlation is   recommended.    Blood Culture [[id number]] Collected: 06/03/24 1304    Order Status: Completed Lab Status: Preliminary result Updated: 06/06/24 1801    Specimen: Blood,peripheral      Blood Culture Result No Growth 3 Days    Narrative:      Aerobic Bottle Volume - 0 mL     Comment -  Less than optimal blood volume collected, which may yield a false-negative result. Adequate volume is correlated with increased recovery rates. Lower volumes may adversely affect recovery and/or detection times. Clinical correlation is   recommended.    Clostridium difficile by EIA [[id number]]  (Abnormal) Collected: 06/03/24 0652    Order Status: Completed Lab Status: Final result Updated: 06/03/24 1027    Specimen: Stool      Expression of C. difficile toxin A/B Genes Detected    Clostridium difficile(toxigenic)PCR [[id number]]  (Abnormal) Collected: 06/03/24 0652    Order Status: Completed Lab Status: Final result Updated: 06/03/24 0958    Specimen: Stool      C. Difficile Toxin B Gene Positive    Narrative:      Correlate with EIA results to assess patient's likelihood of C. difficile infection versus colonization.       Immunizations     Name Date      TDAP 08/03/17        Follow-up Information     Igor Field MD Follow up in 1 week(s).    Specialty: Family Medicine  Contact information:  [address] [place] [address] [phone]                        Your Home Meds List      TAKE these medications       Instructions Authorizing Provider Morning Afternoon Evening As Needed   amoxicillin-pot clavulanate 600-42.9 mg/5mL Susr  Commonly known as: Augmentin  Next dose due: 06/07 Tonight      Take 14 mL (1,680 mg total) by mouth 2 (two) times daily for 3 days.  Stop taking on: Alisson 10, 2024   Steffen Martinez         melatonin 3 MG Tabs  Notes to patient: Resume home schedule       Ginny Nuñez         pancrelipase (Lip-Prot-Amyl) 41124-74764 units Cpep  Commonly known as: Zenpep  Next dose due: As needed      1 capsule (10,000 Units total) by Per G Tube route as needed (to unclog non-patent tubes).   Steffen Martinez         QUEtiapine 25 MG Tabs  Commonly known as: SEROquel  Notes to patient: Resume home schedule      Take 0.5 tablets (12.5 mg total) by mouth 2 (two) times daily  as needed (agitation).   Ricky Cedillo         sodium bicarbonate 325 MG Tabs  Next dose due: As needed      Take 1 tablet (325 mg total) by mouth as needed (to unclog non-patent tubes).   Steffen Martinez         vancomycin 50 mg/mL Solr  Commonly known as: Firvanq  Next dose due: Tonight      Take 2.5 mL (125 mg total) by mouth 4 (four) times daily for 6 days.  Stop taking on: June 13, 2024   Steffen Martinez               Where to Get Your Medications      These medications were sent to [facility] [facility] - [place], [place] - [address] [phone], [phone]  [address], [place] [address]    Phone: [phone]   amoxicillin-pot clavulanate 600-42.9 mg/5mL Susr  pancrelipase (Lip-Prot-Amyl) 62757-71046 units Cpep  sodium bicarbonate 325 MG Tabs  vancomycin 50 mg/mL Solr           [phone] - MAR ACTION REPORT  (last 48 hrs)    ** SITE UNKNOWN **     Order ID Medication Name Action Time Action Reason Comments    [id number] acetaminophen (Tylenol Extra Strength) tab 500 mg 06/05/24 2135 Given      [id number] acetaminophen (Tylenol) tab 650 mg (Or Linked Group #1) 06/06/24 0544 Given      [id number] acetaminophen (Tylenol) tab 650 mg 06/06/24 2150 Given      [id number] ampicillin-sulbactam (Unasyn) 3 g in sodium chloride 0.9% 100mL IVPB-ADD 06/05/24 2034 New Bag      [id number] ampicillin-sulbactam (Unasyn) 3 g in sodium chloride 0.9% 100mL IVPB-ADD 06/06/24 0312 New Bag      [id number] ampicillin-sulbactam (Unasyn) 3 g in sodium chloride 0.9% 100mL IVPB-ADD 06/06/24 0942 New Bag      [id number] ampicillin-sulbactam (Unasyn) 3 g in sodium chloride 0.9% 100mL IVPB-ADD 06/06/24 1447 New Bag      [id number] ampicillin-sulbactam (Unasyn) 3 g in sodium chloride 0.9% 100mL IVPB-ADD 06/06/24 2106 New Bag      [id number] ampicillin-sulbactam (Unasyn) 3 g in sodium chloride 0.9% 100mL IVPB-ADD 06/07/24 0303 New Bag      [id number] ampicillin-sulbactam (Unasyn) 3 g in sodium chloride 0.9% 100mL IVPB-ADD 06/07/24 0920  New Bag      [id number] ampicillin-sulbactam (Unasyn) 3 g in sodium chloride 0.9% 100mL IVPB-ADD 06/07/24 1319 New Bag      [id number] magnesium oxide (Mag-Ox) tab 400 mg 06/07/24 0920 Given      [id number] melatonin tab 3 mg 06/05/24 2135 Given      [id number] melatonin tab 3 mg 06/06/24 2106 Given      [id number] vancomycin (Firvanq) 50 mg/mL oral solution 125 mg 06/05/24 2036 Given      [id number] vancomycin (Firvanq) 50 mg/mL oral solution 125 mg 06/06/24 0928 Given      [id number] vancomycin (Firvanq) 50 mg/mL oral solution 125 mg 06/06/24 1446 Given      [id number] vancomycin (Firvanq) 50 mg/mL oral solution 125 mg 06/06/24 1743 Given      [id number] vancomycin (Firvanq) 50 mg/mL oral solution 125 mg 06/06/24 2106 Given      [id number] vancomycin (Firvanq) 50 mg/mL oral solution 125 mg 06/07/24 1008 Given      [id number] vancomycin (Firvanq) 50 mg/mL oral solution 125 mg 06/07/24 1319 Given              Recent Vital Signs    Flowsheet Row Most Recent Value   /67 Filed at 06/07/2024 0918   Pulse 89 Filed at 06/07/2024 0918   Resp 18 Filed at 06/07/2024 0918   Temp 98.4 °F (36.9 °C) Filed at 06/07/2024 0918   SpO2 92 % Filed at 06/07/2024 0918      Patient's Most Recent Weight    Flowsheet Row Most Recent Value   Patient Weight 36.6 kg (80 lb 9.6 oz)         Lab Results Last 24 Hours      Magnesium [[id number]] (Normal)  Resulted: 06/07/24 0724, Result status: Final result   Ordering provider: Vadim Nunes MD  06/06/24 2300 Resulting lab: [facility])    Specimen Information    Type Source Collected On   Blood — 06/07/24 0635          Components    Component Value Reference Range Flag Lab   Magnesium 1.6 1.6 - 2.6 mg/dL — [facility] ([facility])            Renal Function Panel [[id number]] (Abnormal)  Resulted: 06/07/24 0724, Result status: Final result   Ordering provider: Steffen Martinez MD  06/06/24 2300 Resulting lab: [facility] ([facility])    Specimen  Information    Type Source Collected On   Blood — 06/07/24 0635          Components    Component Value Reference Range Flag Lab   Glucose 150 70 - 99 mg/dL [facility] ([facility])   Sodium 137 136 - 145 mmol/L — [facility])   Potassium 3.5 3.5 - 5.1 mmol/L — [facility])   Chloride 109 98 - 112 mmol/L — [facility])   CO2 25.0 21.0 - 32.0 mmol/L — [facility])   Anion Gap 3 0 - 18 mmol/L — [facility])   BUN 16 9 - 23 mg/dL — [facility])   Creatinine 0.64 0.55 - 1.02 mg/dL — [facility])   BUN/CREA Ratio 25.0 10.0 - 20.0 H [facility] ([facility])   Calcium, Total 7.9 8.7 - 10.4 mg/dL L [facility])   Calculated Osmolality 288 275 - 295 mOsm/kg — [facility] ([facility])   eGFR-Cr 82 >=60 mL/min/1.73m2 — [facility])   Albumin 2.5 3.2 - 4.8 g/dL L [facility])   Phosphorus 2.8 2.4 - 5.1 mg/dL — [facility])            CBC With Differential With Platelet [[id number]] (Abnormal)  Resulted: 06/07/24 0654, Result status: Final result   Ordering provider: Steffen Martinez MD  06/06/24 2300 Resulting lab: [facility] ([facility])   Narrative:  The following orders were created for panel order CBC With Differential With Platelet.  Procedure                               Abnormality         Status                     ---------                               -----------         ------                     CBC W/ DIFFERENTIAL[[id number]]          Abnormal            Final result                 Please view results for these tests on the individual orders.    Specimen Information    Type Source Collected On   Blood — 06/07/24 0635            Testing Performed By     Lab - Abbreviation Name Director Address Valid Date Range    162 - [facility] ([facility]) [facility] ([facility]) Mainor Salazar M.D. [address] [facility] [address] 03/19/20 1442 - Present            Pending Labs     Order Current Status    Blood Culture  Preliminary result    Blood Culture Preliminary result         H&P - H&P Note      H&P signed by Carlos Jones MD at 2024  6:54 PM  Version 1 of 1    Author: Carlos Jones MD Service: — Author Type: Physician    Filed: 2024  6:54 PM Date of Service: 2024  4:51 PM Status: Signed    : Carlos Jones MD (Physician)       [facility]  [facility] of [facility]  HISTORY AND PHYSICAL       Joy Jenkins Patient Status:  Emergency    1931  89 year old CSN [id number]   Location  Attending Evaristo Roman MD     PCP Igor Field MD         DATE OF ADMISSION: 2024     CHIEF COMPLAINT: Decreased appetite    HISTORY OF PRESENT ILLNESS  This is a 89 year oldfemale who is brought in by family due to decreased appetite and inability to eat at home.  History from patient extremely limited.  Patient with history of dementia.  Patient AAO times person only.  When asked why patient was at the hospital she stated she was unsure.  Did not realize she was at the hospital.  Patient denied current pain.  Patient otherwise denied chest pain, shortness of breath, abdominal pain, nausea vomit, fevers or chills.  Further history from patient's daughter and granddaughter at bedside.  Daughter states patient had a fall several weeks prior.  She was diagnosed with a hip fracture by her PCP at that time.  Due to patient's dementia and previous experience with anesthesia, daughter made decision not to pursue further evaluation and did not want surgery.  Daughter reports that since that time, patient's appetite has progressively declined.  Daughter states patient is able to transfer and is up and out of bed at home with assistance.  Daughter states they have brought patient to hospital as they wish to have a feeding tube placed to help with nutrition as their concern is that she is not healing due to poor nutritional status from decreased oral intake.    PAST MEDICAL HISTORY  Past  Medical History:    Moderate late onset Alzheimer's dementia without behavioral disturbance, psychotic disturbance, mood disturbance, or anxiety (HCC)        PAST SURGICAL HISTORY  Past Surgical History:   Procedure Laterality Date    Tonsillectomy      Umbilical hernia repair  1988       ALLERGIES   Bactrim ds, Ciprofloxacin, Penicillins, and Sulfamethoxazole w/trimethoprim    MEDICATIONS  Patient's Medications   New Prescriptions    No medications on file   Previous Medications    MELATONIN 3 MG ORAL TAB        QUETIAPINE 25 MG ORAL TAB    Take 0.5 tablets (12.5 mg total) by mouth 2 (two) times daily as needed (agitation).   Modified Medications    No medications on file   Discontinued Medications    No medications on file       SOCIAL HISTORY  Social History     Socioeconomic History    Marital status:    Tobacco Use    Smoking status: Former    Smokeless tobacco: Never   Vaping Use    Vaping status: Never Used   Substance and Sexual Activity    Alcohol use: Yes     Alcohol/week: 1.0 standard drink of alcohol     Types: 1 Glasses of wine per week    Drug use: No   Other Topics Concern    Pt has a pacemaker No    Pt has a defibrillator No    Reaction to local anesthetic No       FAMILY HISTORY  History reviewed. No pertinent family history.    PHYSICAL EXAM  Vital signs:  /71   Pulse 84   Temp 98.2 °F (36.8 °C) (Oral)   Resp 20   SpO2 95%      GENERAL: Thin, frail female.  Awake and alert, in no acute distress.  HEART:  Regular rhythm.  Regular rate   LUNGS:  Air entry was minimally decreased.  No increased work of breathing or wheezes   ABDOMEN: Soft and non-tender.   NEUROLOGICAL: AAO x 1, person  PSYCHIATRIC: Normal mood      IMAGING  XR CHEST AP PORTABLE  (CPT=71045)    Result Date: 6/2/2024  CONCLUSION:  Patchy right basilar airspace opacity, concerning for aspiration/pneumonia.  Trace right pleural effusion.  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   Dictated by (CST): Radha Stone MD on 6/02/2024 at 4:19 PM     Finalized by (CST): Radha Stone MD on 6/02/2024 at 4:23 PM           Data:  Recent Labs   Lab 06/02/24  1532   RBC 3.10*   HGB 9.1*   HCT 26.8*   MCV 86.5   MCH 29.4   MCHC 34.0   RDW 15.2*   NEPRELIM 8.98*   WBC 10.6   .0     Recent Labs   Lab 06/02/24  1532   GLU 94   BUN 21   CREATSERUM 0.64   CA 7.9*   ALB 2.9*      K 3.6      CO2 30.0   ALKPHO 118   AST 13   ALT 7*   BILT 1.3*   TP 5.3*       ASSESSMENT/PLAN    Failure to thrive  Anorexia  -Patient with history of dementia  -Status post fall with recent hip fracture  -Brought in by family due to decreased oral intake  -Family seeking PEG tube placement for nutrition  -Agreeable to palliative care consultation  -Consult dietary  -Symptom relief as able    Fall  Reported right hip fracture  -Daughter states patient with fall at home.  -Was managed by PCP.  -No imaging on file at this time, offered repeat imaging of hip and orthopedic consult, but daughter declined.  -PT/OT evaluation  -Pain control as needed    Abnormal chest x-ray  -Patient without subjective shortness of breath  -Patient without signs of hypoxia  -Chest x-ray reviewed, noted patchy right basilar airspace opacity  -Some concern for possible aspiration  -Keep n.p.o. until cleared by SLP.  -Without current signs of infection  -Procalcitonin low  -Monitoring off antibiotics.    Advanced Care Planning:  Discussion held at bedside with patient's daughter, POA, regarding code status, diagnosis, prognosis, and goals of care.  Discussed patient's baseline mental status and new fall with hip fracture.  Patient brought in for failure to thrive and poor oral intake.  Daughter requesting PEG tube placement.  Agreeable to palliative care consult, order placed.  At this time, daughter states she would like patient to remain full code.    ACP time 25 min      Plan of care  discussed with patient and daughter at bedside.  Discussed with ED physician and RN. Decision made that pt needs hospitalization for further management/monitoring.      Carlos Jones MD    This note was prepared using [employer] voice recognition dictation software. As a result errors may occur. When identified these errors have been corrected. While every attempt is made to correct errors during dictation discrepancies may still exist      Electronically signed by Carlos Jones MD on 2024  6:54 PM              Consults - MD Consult Notes      Consults signed by Carlos Beck MD at 6/3/2024  3:51 PM     Author: Carlos Beck MD Service: Palliative Care Author Type: Physician    Filed: 6/3/2024  3:51 PM Date of Service: 6/3/2024 11:46 AM Status: Addendum    : Carlos Beck MD (Physician)    Related Notes: Original Note by Carlos Beck MD (Physician) filed at 6/3/2024 12:08 PM     Consult Orders    1. Consult Palliative Care [[id number]] ordered by Carlos Jones MD at 24 1704               Palliative Care Initial Inpatient Consult    Joy Jenkins Patient Status:  Observation    1931 MRN [id number]   Location [facility] 5SW/SE Attending Sherri Overton MD   Hosp Day # 0 PCP Igor Field MD     Date of Consult: 6/3/2024  Patient seen at: [facility]    Reason for Consultation: Consult requested for goals of care and care coordination.    Subjective     History of Present Illness: This is a 89 year oldfemale who is brought in by family due to decreased appetite and inability to eat at home.  History from patient extremely limited.  Patient with history of dementia.  Patient AAO times person only.  When asked why patient was at the hospital she stated she was unsure.  Did not realize she was at the hospital.  Patient denied current pain.  Patient otherwise denied chest pain, shortness of breath, abdominal pain, nausea vomit, fevers or chills.   Further history from patient's daughter at bedside.  Daughter states patient had a fall several weeks prior.  She was diagnosed with a hip fracture by her PCP at that time.  Due to patient's dementia and previous experience with anesthesia, daughter made decision not to pursue further evaluation and did not want surgery.  Daughter reports that since that time, patient's appetite has progressively declined.  Daughter states patient is able to transfer and is up and out of bed at home with assistance.  Daughter states they have brought patient to hospital as they wish to have a feeding tube placed to help with nutrition as their concern is that she is not healing due to poor nutritional status from decreased oral intake. Our palliative care service was asked to evaluate the patient for a goals of care discussion and symptom management.      Review of Systems: Palliative Care symptom needs assessed:     Unable to obtain     Palliative Care Social History:   Marital Status:    Children: Yes  Living Situation Prior to Admit: Home  Occupational History: Retired  Personal: Lives with daughter Daniela ROGERS Alice KEARNS     requested: No    Medical History: obtained from [employer]  Past Medical History:    Moderate late onset Alzheimer's dementia without behavioral disturbance, psychotic disturbance, mood disturbance, or anxiety (HCC)     Past Surgical History:   Procedure Laterality Date    Fracture surgery      Tonsillectomy      Umbilical hernia repair  1988       Family History: obtained from [employer]  History reviewed. No pertinent family history.    Allergies:  Allergies   Allergen Reactions    Bactrim Ds OTHER (SEE COMMENTS)    Ciprofloxacin OTHER (SEE COMMENTS)    Penicillins OTHER (SEE COMMENTS)     Tolerate amoxicillin    Sulfamethoxazole W/Trimethoprim OTHER (SEE COMMENTS)       Medications:     Current Facility-Administered Medications:     potassium chloride 40 mEq in 250mL sodium chloride 0.9% IVPB  premix, 40 mEq, Intravenous, Once    ampicillin-sulbactam (Unasyn) 3 g in sodium chloride 0.9% 100mL IVPB-ADD, 3 g, Intravenous, q6h    metRONIDAZOLE in sodium chloride 0.79% (Flagyl) 5 mg/mL IVPB premix 500 mg, 500 mg, Intravenous, Q8H    melatonin tab 3 mg, 3 mg, Oral, Nightly    QUEtiapine (SEROquel) tab 12.5 mg, 12.5 mg, Oral, BID PRN    sodium chloride 0.9% infusion, , Intravenous, Continuous    heparin (Porcine) 5000 UNIT/ML injection 5,000 Units, 5,000 Units, Subcutaneous, Q8H ANGELA    acetaminophen (Tylenol Extra Strength) tab 500 mg, 500 mg, Oral, Q4H PRN    acetaminophen (Tylenol) tab 650 mg, 650 mg, Oral, Q4H PRN **OR** HYDROcodone-acetaminophen (Norco) 5-325 MG per tab 1 tablet, 1 tablet, Oral, Q4H PRN **OR** HYDROcodone-acetaminophen (Norco) 5-325 MG per tab 2 tablet, 2 tablet, Oral, Q4H PRN    ondansetron (Zofran) 4 MG/2ML injection 4 mg, 4 mg, Intravenous, Q6H PRN  No current outpatient medications on file.         Palliative Performance Scale: 40 %  % Ambulation Activity Level Self-Care Intake Consciousness   100 Full  Normal  No Disease Full Normal Full   90 Full  Normal  Some Disease Full Normal Full   80 Full  Normal w/effort  Some Disease Full Normal or reduced Full   70 Reduced  Can't Perform Job  Some Disease Full Normal or reduced Full   60 Reduced  Can't Perform Hobby   Significant Disease Occ Assist Normal or reduced Full or confused   50 Mainly sit/lie Can't do any work  Extensive Disease Partial Assist Normal or reduced Full or confused   40 Mainly in bed Can't do any work  Extensive Disease Mainly Assist Normal or reduced Full or confused   30 Bed Bound Can't do any work  Extensive Disease Max Assist  Total Care Reduced  Drowsy/confused   20 Bed Bound Can't do any work  Extensive Disease Max Assist  Total Care Minimal  Drowsy/confused   10 Bed Bound Can't do any work  Extensive Disease Max Assist  Total Care Mouth Care  Drowsy/confused   0 Death        Objective      Vital Signs:  Blood  pressure 110/54, pulse 81, temperature 97.7 °F (36.5 °C), temperature source Oral, resp. rate 18, weight 80 lb 9.6 oz (36.6 kg), SpO2 94%.  Body mass index is 14.28 kg/m².  Non-verbal signs of pain present: No    Physical Exam:  General: Alert, awake and in no  apparent respiratory distress. Cachectic.  HEENT: AT/NC. No focal deficits. Edentulous  Cardiac: RRR  Lungs: Normal effort on RA  Abdomen: Soft, non-tender, non-distended, normal bowel sounds X 4 quadrants   Extremities: FROM of all limbs, no  Edema present  Skin: Warm and dry.    Hematology:  Lab Results   Component Value Date    WBC 9.3 06/03/2024    HGB 8.4 (L) 06/03/2024    HCT 24.8 (L) 06/03/2024    .0 06/03/2024       Coags:  Lab Results   Component Value Date    INR 1.12 06/03/2024       Chemistry:  Lab Results   Component Value Date    CREATSERUM 0.53 (L) 06/03/2024    BUN 18 06/03/2024     (L) 06/03/2024    K 3.4 (L) 06/03/2024     06/03/2024    CO2 26.0 06/03/2024    GLU 77 06/03/2024    CA 7.6 (L) 06/03/2024    ALB 2.9 (L) 06/02/2024    ALKPHO 118 06/02/2024    BILT 1.3 (H) 06/02/2024    TP 5.3 (L) 06/02/2024    AST 13 06/02/2024    ALT 7 (L) 06/02/2024    MG 1.9 06/03/2024    PHOS 4.5 12/26/2023       Imaging:  XR CHEST AP PORTABLE  (CPT=71045)    Result Date: 6/2/2024  CONCLUSION:  Patchy right basilar airspace opacity, concerning for aspiration/pneumonia.  Trace right pleural effusion.  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   Dictated by (CST): Radha Stone MD on 6/02/2024 at 4:19 PM     Finalized by (CST): Radha Stone MD on 6/02/2024 at 4:23 PM           Assessment and Recommendations        Weakness generalized      Symptom Management      NA    2.     Serious Illness Conversation:   Code Status: Full  POA: Daughter Laura (on file)  I met Jyo and daughter Daniela at bedside. Joy lives with Daniela at home and requires caregivers and  assistance with ADLs. She is non ambulatory but can transition to commode.   They are concerned about her weight loss and so inquiring about a PEG tube. I explained that the PEG tube would not likely prevent aspiration, if that is the cause of her PNA. They are not yet shown to increase life expectancy in dementia according to our best available evidence.  I also asked about code status: Daniela said that they would not want her to be coded if she were lying in bed or asleep and dying in a natural way, but if there were something reversible, then they would want a consideration for CPR.   They were offered palliative care at home but felt there was too much overlap with home health care which they already had. I explained that it would be a good bridge to full hospice care, and Daniela agreed that was reasonable. I will follow up with additional information about hospice for her so that they can consider enrollment if Joy were to further decline.       Palliative Care Follow Up: Palliative care team will Continue to follow while inpatient    Thank you for allowing Palliative Care services to participate in the care of Joy Jenkins.    A total of 55 minutes were spent on this visit, which included all of the following: direct face to face contact, history taking, physical examination, and >50% was spent counseling and coordinating care.    Carlos Beck MD  6/3/2024  12:01 PM  Palliative Care Services  [facility] [phone]    Note to patient:  The 21st Century Cures Act makes medical notes like these available to patients in the interest of transparency. However, be advised this is a medical document. It is intended as peer to peer communication. It is written in medical language and may contain abbreviations or verbiage that are unfamiliar. It may appear blunt or direct. Medical documents are intended to carry relevant information, facts as evident, and the clinical opinion of the  practitioner.        Electronically signed by Carlos Beck MD on 6/3/2024  3:51 PM           D/C Summary    No notes of this type exist for this encounter.     Physical Therapy Notes (last 72 hours)  Notes from 6/4/2024  4:03 PM through 6/7/2024  4:03 PM   No notes of this type exist for this encounter.        Occupational Therapy Notes (last 72 hours)      Occupational Therapy Note signed by Yolanda Zavaleta OT at 6/6/2024  5:41 PM  Version 2 of 2    Author: Yolanda Zavaleta OT Service: — Author Type: Occupational Therapist    Filed: 6/6/2024  5:41 PM Date of Service: 6/6/2024  5:05 PM Status: Addendum    : Yolanda Zavaleta OT (Occupational Therapist)    Related Notes: Original Note by Yolanda Zavaleta OT (Occupational Therapist) filed at 6/6/2024  5:39 PM       OCCUPATIONAL THERAPY TREATMENT NOTE - INPATIENT        Room Number: 546/546-A     Presenting Problem: generalized weakness, fall (daughter reports R hip fx and WBAT, declined f/u imaging this admission)    Problem List  Principal Problem:    Weakness generalized      OCCUPATIONAL THERAPY ASSESSMENT   Patient demonstrates fair progress this session, goals remain in progress. Patient s/p PEG tube placement 6/5.    Patient continues to function below baseline with ADLs and fx mobility/transfers.   Contributing factors to remaining limitations include decreased functional strength, decreased functional reach, decreased endurance, pain, impaired balance, decreased muscular endurance, cognitive deficits, and decreased safety awareness.  Next session anticipate patient to progress bed mobility, transfers, stating sitting balance, and dynamic sitting balance.  Occupational Therapy will continue to follow patient for duration of hospitalization.    Patient continues to benefit from continued skilled OT services: at discharge to promote functional independence and safety with additional support and return home with home health OT.     PLAN  OT Treatment  Plan: Balance activities;Energy conservation/work simplification techniques;ADL training;Functional transfer training;Endurance training;Patient/Family education;Patient/Family training;Equipment eval/education;Compensatory technique education  OT Device Recommendations: TBD    SUBJECTIVE  Patient agreeable to OT treatment session.    OBJECTIVE  Precautions: Aspiration;Bed/chair alarm (PEG)    WEIGHT BEARING RESTRICTION  R Lower Extremity: Weight Bearing as Tolerated (per patient's daughter)    PAIN ASSESSMENT  Rating: -- (not rated)  Location: PEG site  Management Techniques: Nurse notified; Repositioning; Activity promotion; Body mechanics    ACTIVITIES OF DAILY LIVING ASSESSMENT  AM-PAC ‘6-Clicks’ Inpatient Daily Activity Short Form  How much help from another person does the patient currently need…  -   Putting on and taking off regular lower body clothing?: A Lot  -   Bathing (including washing, rinsing, drying)?: A Lot  -   Toileting, which includes using toilet, bedpan or urinal? : A Lot  -   Putting on and taking off regular upper body clothing?: A Little  -   Taking care of personal grooming such as brushing teeth?: A Little  -   Eating meals?: A Little    AM-PAC Score:  Score: 15  Approx Degree of Impairment: 56.46%  Standardized Score (AM-PAC Scale): 34.69  CMS Modifier (G-Code): CK    BED MOBILITY  Supine <> Sit: Max assist x1  Comments: Tolerated sitting at EOB ~10 minutes with initially mod progressing to CGA for static sitting balance with L UE support on bedrail. Tolerated 2 x 5 repetitions of B knee extensions with brief recovery period d/t fatigue. Benefited from simple commands and cues for full ROM. Required max assist x2 for boosting closer to HOB. Patient's daughter confirmed she feels comfortable providing level of assist patient is currently requiring in home setting as family and part-time caregiver have been caring for patient at this level of function since her hip fx.    FUNCTIONAL  TRANSFER ASSESSMENT  Patient declined to progress mobility at this time.    FUNCTIONAL ADL ASSESSMENT  LB Dressing: max assist  Toileting: total assist  Comments:  Ordered Busy Apron from [employer] d/t patient fidgeting with B hands at PEG tube site.    EDUCATION PROVIDED  Patient: Role of Occupational Therapy; Discharge Recommendations; Plan of Care; Posture/Positioning; Proper Body Mechanics  Patient's Response to Education: Demonstrates Poor Carry Over to Information  Family/Caregiver: Plan of Care; Role of Occupational Therapy; Discharge Recommendations  Family/Caregiver's Response to Education: Verbalized Understanding    The patient's Approx Degree of Impairment: 56.46% has been calculated based on documentation in the [facility] '6 clicks' Inpatient Daily Activity Short Form.  Research supports that patients with this level of impairment may benefit from rehab.  Final disposition will be made by interdisciplinary medical team.    Patient End of Session: Needs met;Call light within reach;RN aware of session/findings;All patient questions and concerns addressed;In bed;Alarm set;Family present    OT Goals:  Patient's self stated goal is: none stated     Patient will complete functional transfer with CGA  Comment: ongoing    Patient will tolerate standing for 2-3 minutes in prep for adls with CGA  Comment: ongoing    Patient will complete item retrieval with CGA  Comment: ongoing          Goals  on: 24  Frequency: 3-5x/week    OT Session Time  Therapeutic Activity: 23 minutes    [employer]/L  [facility]  #[id number]         Occupational Therapy Note signed by Yolanda Zavaleta OT at 2024  5:39 PM  Version 1 of 2    Author: Yolanda Zavaleta OT Service: — Author Type: Occupational Therapist    Filed: 2024  5:39 PM Date of Service: 2024  5:05 PM Status: Signed    : Yolanda Zavaleta OT (Occupational Therapist)    Related Notes: Addendum by Yolanda Zavaleta OT  (Occupational Therapist) filed at 6/6/2024  5:41 PM       OCCUPATIONAL THERAPY TREATMENT NOTE - INPATIENT        Room Number: 546/546-A     Presenting Problem: generalized weakness, fall (daughter reports R hip fx and WBAT, declined f/u imaging this admission)    Problem List  Principal Problem:    Weakness generalized      OCCUPATIONAL THERAPY ASSESSMENT   Patient demonstrates fair progress this session, goals remain in progress. Patient s/p PEG tube placement 6/5.    Patient continues to function below baseline with ADLs and fx mobility/transfers.   Contributing factors to remaining limitations include decreased functional strength, decreased functional reach, decreased endurance, pain, impaired balance, decreased muscular endurance, cognitive deficits, and decreased safety awareness.  Next session anticipate patient to progress bed mobility, transfers, stating sitting balance, and dynamic sitting balance.  Occupational Therapy will continue to follow patient for duration of hospitalization.    Patient continues to benefit from continued skilled OT services: at discharge to promote functional independence and safety with additional support and return home with home health OT.     PLAN  OT Treatment Plan: Balance activities;Energy conservation/work simplification techniques;ADL training;Functional transfer training;Endurance training;Patient/Family education;Patient/Family training;Equipment eval/education;Compensatory technique education  OT Device Recommendations: TBD    SUBJECTIVE  Patient agreeable to OT treatment session.    OBJECTIVE  Precautions: Aspiration;Bed/chair alarm (PEG)    WEIGHT BEARING RESTRICTION  R Lower Extremity: Weight Bearing as Tolerated (per patient's daughter)    PAIN ASSESSMENT  Rating: -- (not rated)  Location: PEG site  Management Techniques: Nurse notified; Repositioning; Activity promotion; Body mechanics    ACTIVITIES OF DAILY LIVING ASSESSMENT  AM-PAC ‘6-Clicks’ Inpatient Daily  Activity Short Form  How much help from another person does the patient currently need…  -   Putting on and taking off regular lower body clothing?: A Lot  -   Bathing (including washing, rinsing, drying)?: A Lot  -   Toileting, which includes using toilet, bedpan or urinal? : A Lot  -   Putting on and taking off regular upper body clothing?: A Little  -   Taking care of personal grooming such as brushing teeth?: A Little  -   Eating meals?: A Little    AM-PAC Score:  Score: 15  Approx Degree of Impairment: 56.46%  Standardized Score (AM-PAC Scale): 34.69  CMS Modifier (G-Code): CK    BED MOBILITY  Supine <> Sit: Max assist x1  Comments: Tolerated sitting at EOB ~10 minutes with initially mod progressing to CGA for static sitting balance with L UE support on bedrail. Tolerated 2 x 5 repetitions of B knee extensions with brief recovery period d/t fatigue. Benefited from simple commands and cues for full ROM.    FUNCTIONAL TRANSFER ASSESSMENT  Patient declined to progress mobility at this time.    FUNCTIONAL ADL ASSESSMENT  LB Dressing: max assist  Toileting: total assist  Comments:  Ordered Busy Apron from [employer] d/t patient fidgeting with B hands at PEG tube site.    EDUCATION PROVIDED  Patient: Role of Occupational Therapy; Discharge Recommendations; Plan of Care; Posture/Positioning; Proper Body Mechanics  Patient's Response to Education: Demonstrates Poor Carry Over to Information  Family/Caregiver: Plan of Care; Role of Occupational Therapy; Discharge Recommendations  Family/Caregiver's Response to Education: Verbalized Understanding    The patient's Approx Degree of Impairment: 56.46% has been calculated based on documentation in the [facility] '6 clicks' Inpatient Daily Activity Short Form.  Research supports that patients with this level of impairment may benefit from rehab.  Final disposition will be made by interdisciplinary medical team.    Patient End of Session: Needs met;Call light within reach;RN  aware of session/findings;All patient questions and concerns addressed;In bed;Alarm set;Family present    OT Goals:  Patient's self stated goal is: none stated     Patient will complete functional transfer with CGA  Comment: ongoing    Patient will tolerate standing for 2-3 minutes in prep for adls with CGA  Comment: ongoing    Patient will complete item retrieval with CGA  Comment: ongoing          Goals  on: 24  Frequency: 3-5x/week    OT Session Time  Therapeutic Activity: 23 minutes    Yolanda Zavaleta OTR/L  [facility]  #[id number]               Video Swallow Study Notes    No notes of this type exist for this encounter.        SLP Note - SLP Notes      SLP Note signed by Angy Loza SLP at 2024  1:21 PM  Version 1 of 1    Author: Angy Loza SLP Service: Rehab Author Type: SPEECH-LANGUAGE PATHOLOGIST    Filed: 2024  1:21 PM Date of Service: 2024  9:12 AM Status: Signed    : Angy Loza SLP (SPEECH-LANGUAGE PATHOLOGIST)    Summary:Pt discharged from swallowing tx on pleasure feeds of pureed/thin liquids; G-tube planned for 24.             SPEECH DAILY NOTE - INPATIENT    ASSESSMENT & PLAN   ASSESSMENT    Proper PPE worn. Hands sanitized upon entrance/exit Pt room.       Pt alert, afebrile and on room air. Pt seen for swallow analysis per BSE recommendations (after consulting with RN). Dtr present. Preferred method of learning verbal. Pt upright in chair; observed with current diet of pureed/thin liquids (lunch tray) for monitoring diet tolerance. Swallowing precautions/strategies discussed as Pt is fed oral trials. Slow lingual A-P transit of pureed consistency with increased VIV. No significant oral retention. Pharyngeal response appeared to trigger within 2-3 sec per hyolaryngeal elevation to completion (functional rise/strength per palpation). No overt CSA on pureed nor thin liquids. Pt with overall reduced oral intake. G-tube  placement scheduled for 6/05/24. Collaborated with RN regarding Pt's swallowing plan of care. No report of difficulty taking meds. No new CXR completed. Sp02 ~93% during this session. Call light within Pt's reach upon SLP discharge from room.      CXR 6/02/24:  CONCLUSION:   Patchy right basilar airspace opacity, concerning for aspiration/pneumonia.   Trace right pleural effusion.   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            PLAN: No continued skilled swallowing intervention required secondary plan of G-tube placement and functional swallowing skills on least restrictive SAFEST pleasure feed diet and family education completed.        Diet Recommendations - Solids: Puree  Diet Recommendations - Liquids: Thin Liquids    Compensatory Strategies Recommended: No straws;Slow rate;Small bites and sips  Aspiration Precautions: Upright position;Slow rate;Small bites and sips;No straw;Cueing to swallow  Medication Administration Recommendations: Crushed in puree    Patient Experiencing Pain: No  Treatment Plan  Treatment Plan/Recommendations: Aspiration precautions  Interdisciplinary Communication: Discussed with RN  Plan posted at bedside      GOALS  Goal #1 The patient will tolerate puree consistency and thin liquids without overt signs or symptoms of aspiration with 90 % accuracy over 1 session(s).    No CSA on current diet. Pleasure feed diet (G-tube planned for 6/05/24).       GOAL MET   I   Goal #2 The patient/family/caregiver will demonstrate understanding and implementation of aspiration precautions and swallow strategies independently over 1 session(s).   GOAL MET     Goal #3 The patient will tolerate trial upgrade of soft easy to chew solid consistency and thin liquids without overt signs or symptoms of aspiration with 90 % accuracy over 1 session(s).    N/A   Goal discontinued.           FOLLOW UP  Follow Up Needed  (Documentation Required): No  SLP Follow-up Date: 06/04/24  Number of Visits to Meet Established Goals: 1    Session: 1    If you have any questions, please contact   Angy Loza M.S. CCC/SLP  Speech-Language Pathologist  [facility]  #[id number]      Electronically signed by Angy Loza, SLP on 6/4/2024  1:21 PM           Multidisciplinary Problems     Active Goals     Not on file          Resolved Goals        Problem: Patient/Family Goals    Goal Priority Disciplines Outcome Interventions   Patient/Family Long Term Goal   (Resolved)     Interdisciplinary Adequate for Discharge    Description: Patient's Long Term Goal: ***    Interventions:  - ***  - See additional Care Plan goals for specific interventions   Patient/Family Short Term Goal   (Resolved)     Interdisciplinary Adequate for Discharge    Description: Patient's Short Term Goal: ***    Interventions:   - ***  - See additional Care Plan goals for specific interventions               Discharge Treatment Preferences    Flowsheet Row Most Recent Value   Preferences    Submitted to [facility] Yes

## (undated) NOTE — LETTER
Department: [facility]  Phone: [phone]  Ordering Provider: Ricky Cedillo  Ordering Provider Address: [address] [place] [address]  Ordering Provider Phone: [phone]  Ordering Provider Fax: [phone]      Patient Name: Joy Jenkins ([id number])  Sex: F : 1931   Order Date: May 20, 2024    Expected Date: 2024    Authorizing Provider: Ricky Cedillo MD  Procedure: XR HIP + PELVIS MIN 4 VIEWS RIGHT (CPT=73503) [[id number]] ([id number])  Order #: [id number]      Priority: Routine      Class: EHV - No RFL      Associated DX: Neurologic gait dysfunction (R26.9)  At moderate risk for fall (Z91.81)  Right leg pain (M79.604)      Indications:                                                             Comments:     Order Specific Questions:  Release to patient: Immediate  Scheduling Instructions:  Home Health to set up  Process Instructions:           Ordering: Karen Hernandez RN  Encounter Provider: Ricky Cedillo MD  Order Authorizing Provider: Ricky Cedillo MD [NPI:[id number]]  Order Date: May 20, 2024 at 11:59 AM  Ordering Department: [facility] [place]

## (undated) NOTE — LETTER
[facility]     Account #:   Order Date: 8/15/2024           EPIC ORD #:   Order Time:  9:37 AM           Client / Ordering Site Information: Physician Information:   Account Name:     Address 1:     Address 2:     City, State Zip:     Phone:     Fax:      Ordering: Ricky Cedillo   Degree: MD   NPI: [id number]   UPIN:     Physician ID:           Patient Information:   Name: OBINNA TRAN   Legal Sex: Female   SSN: [id number]   Patient ID: [id number]    YOB: 1931 (89 years)   Phone: [phone]   Address: [address] [place] [address]                   Prim. Insurance: MEDICARE Sec. [place]   Ins Code: [place]   Plan: MEDICARE PART B ONLY [place]   Address: ATTN CLAIMS  [address]  [facility] [address] Address: [address] [place] [address]   Policy #: [id number] Group #:  Policy #: [id number] Group #: [id number]      Order: CBC With Differential With Platelet (CBC)   Order Priority: Routine   Order class: Normal   Specimen source:   Comments:   Status:   Count:    Interval:   Questions:         Responsible Party / Guarantor Information:   Name: OBINNA TRAN   Address: [address] [place] [address] Zip: [place] [address]   Phone: [phone]   Relation to Pt: Self   Employer Name:           ABN:      Worker's Comp: N Date of Injury:              Diagnosis Codes:   R53.1 G30.1 F02.B0             Electronically Signed By: Ricky Cedillo MD [NPI: [id number]]  Order Date: Aug 15, 2024 at 9:37 AM

## (undated) NOTE — LETTER
[facility]  Administration of Anesthesia  IJoy agree to be cared for by a physician anesthesiologist alone and/or with a nurse anesthetist, who is specially trained to monitor me and give me medicine to put me to sleep or keep me comfortable during my procedure    I understand that my anesthesiologist and/or anesthetist is not an employee or agent of [facility] or [employer]. He or she works for [facility]    As the patient asking for anesthesia services, I agree to:  Allow the anesthesiologist (anesthesia doctor) to give me medicine and do additional procedures as necessary. Some examples are: Starting or using an “IV” to give me medicine, fluids or blood during my procedure, and having a breathing tube placed to help me breathe when I’m asleep (intubation). In the event that my heart stops working properly, I understand that my anesthesiologist will make every effort to sustain my life, unless otherwise directed by [facility] Do Not Resuscitate documents.  Tell my anesthesia doctor before my procedure:  If I am pregnant.  The last time that I ate or drank.  iii. All of the medicines I take (including prescriptions, herbal supplements, and pills I can buy without a prescription (including street drugs/illegal medications). Failure to inform my anesthesiologist about these medicines may increase my risk of anesthetic complications.  iv.If I am allergic to anything or have had a reaction to anesthesia before.  I understand how the anesthesia medicine will help me (benefits).  I understand that with any type of anesthesia medicine there are risks:  The most common risks are: nausea, vomiting, sore throat, muscle soreness, damage to my eyes, mouth, or teeth (from breathing tube placement).  Rare risks include: remembering what happened during my procedure, allergic reactions to medications, injury to my airway, heart, lungs, vision, nerves, or muscles  and in extremely rare instances death.  My doctor has explained to me other choices available to me for my care (alternatives).  Pregnant Patients (“epidural”):  I understand that the risks of having an epidural (medicine given into my back to help control pain during labor), include itching, low blood pressure, difficulty urinating, headache or slowing of the baby’s heart. Very rare risks include infection, bleeding, seizure, irregular heart rhythms and nerve injury.  Regional Anesthesia (“spinal”, “epidural”, & “nerve blocks”):  I understand that rare but potential complications include headache, bleeding, infection, seizure, irregular heart rhythms, and nerve injury.    _____________________________________________________________________________  Patient (or Representative) Signature/Relationship to Patient  Date   Time    _____________________________________________________________________________   Name (if used)    Language/Organization   Time    _____________________________________________________________________________  Nurse Anesthetist Signature     Date   Time  _____________________________________________________________________________  Anesthesiologist Signature     Date   Time  I have discussed the procedure and information above with the patient (or patient’s representative) and answered their questions. The patient or their representative has agreed to have anesthesia services.    _____________________________________________________________________________  Witness        Date   Time  I have verified that the signature is that of the patient or patient’s representative, and that it was signed before the procedure  Patient Name: Joy ROGERS Alice     : 1931                 Printed: 2024 at 7:38 AM    Medical Record #: [id number]                                            Page 1 of 1  ----------ANESTHESIA CONSENT----------

## (undated) NOTE — LETTER
[facility]   [facility]     Account #:   Order Date: 8/15/2024           EPIC ORD #:   Order Time:  9:37 AM           Client / Ordering Site Information: Physician Information:   Account Name:     Address 1:     Address 2:     City, State Zip:     Phone:     Fax:      Ordering: Ricky Cedillo   Degree: MD   NPI: [id number]   UPIN:     Physician ID:           Patient Information:   Name: OBINNA TRAN   Legal Sex: Female   SSN: [id number]   Patient ID: [id number]    YOB: 1931 (89 years)   Phone: [phone]   Address: [address] [place] [address]                   Prim. Insurance: MED[place]ARE Sec. [place]   Ins Code: [place]   Plan: MEDICARE PART B ONLY [place]   Address: ATTN CLAIMS  [address] [place] [id number] Address: [id number] [place] [id number]   Policy #: [id number] Group #:  Policy #: [id number] Group #: [id number]          Order: Vitamin D ()   Order Priority: Routine   Order class: Normal   Specimen source:   Comments:   Status: Future  Count: 1   Interval:   Questions:   Please pick the scenario that best fits the purpose for ordering this test: General Screening/Vit D deficiency (25-Hydroxy)  Release to patient: Immediate       Responsible Party / Guarantor Information:   Name: OBINNA TRAN   Address: [address] [place] [address] Zip: [place] [address]   Phone: [phone]   Relation to Pt: Self   Employer Name:           ABN:      Worker's Comp: N Date of Injury:              Diagnosis Codes:   R53.1 G30.1 F02.B0 M80.08XG           Electronically Signed By: Ricky Cedillo MD [NPI: [id number]]  Order Date: Aug 15, 2024 at 9:37 AM

## (undated) NOTE — LETTER
[facility]  [address]  [facility] [address]  [phone]             REFERRAL ORDER         Patient Name:   Joy Jenkins, ([id number])   Sex: female  : 1931       Order Date:  2024  Authorizing Provider:   ROSE HEARD     Procedure:  HOME HEALTH REFERRAL [[id number]]         Order #:   [id number]  Qty:  1     Priority:  Routine                   Class:   Ext - RFL     Standing Interval:          Standing Occurrences:          Expires on:            Expected by:    Associated DX:  Pneumonia of right lower lobe due to infectious organism (J18.9)     Order summary:  She will need home health for RN and PT and OT.  With this, she will need to have a CBC and CMP with a chest x-ray and right hip x-ray, Ext - RFL, Routine  Functional Limitations: Requires assistance from another person to support safe mobility  Medi  jacques condition influences limitation: Ambulation limitations because the patient is unable to navigate uneven surfaces or long distances without frequent rest periods or assistance by another person, Endurance is poor caused by change or decline in medica  l condition requiring skilled support, At risk for fall or injury, Weakness interfering with activity compromising safe mobility, Balance issues that increase risk of falling and requires assistance to leave the home, Mental status - deteriorating or alt  ered and cannot leave without supervision, At risk for hospitalization or re-hospitalization  Home Health Order: Home Health Team to Assess and Monitor, Home Health to perform  Ordered Discipline(s): PT to evaluate and treat, RN to evaluate and colla  borate with Treating physician to establish home health care plan  Any Other Disciplines: OT  Referral to facility - [facility]   Functional Limitations: Requires assistance from another person to support safe mobility  Medical condition influences limitation: Ambulation limitations  because the patient is unable to navigate uneven surfaces or long distances without frequent rest periods or assistance by another person  Medical condition influences limitation: Endurance is poor caused by change or decline in medical condition requiring skilled support  Medical condition influences limitation: At risk for fall or injury  Medical condition influences limitation: Weakness interfering with activity compromising safe mobility  Medical condition influences limitation: Balance issues that increase risk of falling and requires assistance to leave the home  Medical condition influences limitation: Mental status - deteriorating or altered and cannot leave without supervision  Medical condition influences limitation: At risk for hospitalization or re-hospitalization  Home Health Order: Home Health Team to Assess and Monitor  Home Health Order: Home Health to perform  Ordered Discipline(s): PT to evaluate and treat  Ordered Discipline(s): RN to evaluate and collaborate with Treating physician to establish home health care plan  Any Other Disciplines: OT         Comments:  She will need home health for RN and PT and OT.   With this, she will need to have a CBC and CMP with a chest x-ray and right hip x-ray           Scheduling Instructions:  **REFERRAL REQUEST**     Your physician has referred you to a specialist.  Your physician or the clinic staff will provide you with the phone number you should call to schedule your appointment.      If you are confident that your benefit plan will not require a referral or authorization, such as [place] Medicaid, please feel free to schedule your appointment immediately. However, if you are unsure about the requirements for authorization, please wait 5-7 days and then contact your physician's   office. At that time, you will be provided with any authorization numbers or be assured that none are required. You can then schedule your appointment. Failure to obtain  required authorization numbers can create reimbursement difficulties for you.    Ordering: Karen Hernandez, HERNAN  Encounter Provider: Ricky Cedillo MD  Order Authorizing Provider: Ricky Cedillo MD [NPI:[id number]]  Order Date: May 20, 2024 at 11:59 AM  Ordering Department: [facility] [place]

## (undated) NOTE — LETTER
08/15/24          Joy Jenkins  :  1931    Patient to take Racheal Unified Peptide-plain, 2 cartons per day with a total of 60 per month, to be given via G-tube.    Ricky Cedillo MD  Order Date: August 15, 2024 at 09:29 AM

## (undated) NOTE — LETTER
Patient Name: Bk Hodgson  YOB: 1931          MRN number:  [id number]  Date:  9/12/2019  Referring Physician: Nino Singh    ADULT SWALLOWING EVALUATION:    Referring Physician: Dr. Yoni Caro  Oropharyngeal Dysphagia  Date of Service: 9/12/2019 with reduced hyolaryngeal elevation/excursion via palpation. Pt reports globus sensation \"something is stuck\" while pointing to her neck, across hard solids. Hard solids d/c d/t safety concerns.   No overt clinical signs of aspiration (e.g., immediate/zandra Frequency / Duration: Patient will be seen for 1x/week or a total of 3-4 visits over a 90 day period. Treatment will include dysphagia therapy.     Education or treatment limitation: None  Rehab Potential:good    FCM category and level: Swallowing, 5    Pa

## (undated) NOTE — LETTER
[facility]   [facility]     Account #:   Order Date: 8/15/2024           EPIC ORD #:   Order Time:  9:37 AM             Client / Ordering Site Information: Physician Information:   Account Name:     Address 1:     Address 2:     City, State Zip:     Phone:     Fax:      Ordering: Ricky Cedillo   Degree: MD   NPI: [id number]   UPIN:     Physician ID:           Patient Information:   Name: OBINNA TRAN   Legal Sex: Female   SSN: [id number]   Patient ID: [id number]    YOB: 1931 (89 years)   Phone: [phone]   Address: [address] [place] [address]                   Prim. Insurance: [place]ARE [place]. [place]   Ins Code: [place]   Plan: MEDICARE PART B ONLY [place]   Address: ATTN CLAIMS  [address]  [facility] [address] Address: [phone] [place] [phone]   Policy #: [id number] Group #:  Policy #: [id number] Group #: [id number]          Order: Folic Acid Serum (Folate) (FOL)   Order Priority: Routine   Order class: Normal   Specimen source:   Comments:   Status:   Count:    Interval:   Questions:             Responsible Party / Guarantor Information:   Name: OBINNA TRAN   Address: [address] [place] [address] Zip: [place] [address]   Phone: [phone]   Relation to Pt: Self   Employer Name:           ABN:      Worker's Comp: N Date of Injury:              Diagnosis Codes:   R53.1 G30.1 F02.B0             Electronically Signed By: Ricky Cedillo MD [NPI: [id number]]  Order Date: Aug 15, 2024 at 9:37 AM

## (undated) NOTE — LETTER
Department: [facility]  Phone: [phone]  Ordering Provider: Ricky Cedillo  Ordering Provider Address: [address] [place] [address]  Ordering Provider Phone: [phone]  Ordering Provider Fax: [phone]      Patient Name: Joy Jenkins ([id number])  Sex: F : 1931   Order Date: May 20, 2024    Expected Date: 2024    Authorizing Provider: Ricky Cedillo MD  Procedure: XR CHEST PA + LAT CHEST (CPT=71046) [[id number]] ([id number])  Order #: [id number]      Priority: Routine      Class: Normal      Associated DX: Pneumonia of right lower lobe due to infectious organism (J18.9)      Indications:                                                             Comments:     Order Specific Questions:  Release to patient: Immediate  Will the patient require infection control measures for known or suspected COVID? No  Scheduling Instructions:  Home Health to set up  Process Instructions:           Ordering: Karen Hernandez RN  Encounter Provider: Ricky Cedillo MD  Order Authorizing Provider: Ricky Cedillo MD [NPI:[id number]]  Order Date: May 20, 2024 at 11:59 AM  Ordering Department: [facility] [place]